# Patient Record
Sex: MALE | Race: WHITE | NOT HISPANIC OR LATINO
[De-identification: names, ages, dates, MRNs, and addresses within clinical notes are randomized per-mention and may not be internally consistent; named-entity substitution may affect disease eponyms.]

---

## 2017-01-03 ENCOUNTER — APPOINTMENT (OUTPATIENT)
Dept: ELECTROPHYSIOLOGY | Facility: CLINIC | Age: 82
End: 2017-01-03

## 2017-01-03 VITALS
DIASTOLIC BLOOD PRESSURE: 59 MMHG | OXYGEN SATURATION: 95 % | HEART RATE: 52 BPM | BODY MASS INDEX: 23.19 KG/M2 | HEIGHT: 68 IN | WEIGHT: 153 LBS | SYSTOLIC BLOOD PRESSURE: 97 MMHG

## 2017-01-12 ENCOUNTER — APPOINTMENT (OUTPATIENT)
Dept: ELECTROPHYSIOLOGY | Facility: CLINIC | Age: 82
End: 2017-01-12

## 2017-01-29 ENCOUNTER — NON-APPOINTMENT (OUTPATIENT)
Age: 82
End: 2017-01-29

## 2017-04-14 ENCOUNTER — APPOINTMENT (OUTPATIENT)
Dept: ELECTROPHYSIOLOGY | Facility: CLINIC | Age: 82
End: 2017-04-14

## 2017-04-27 ENCOUNTER — APPOINTMENT (OUTPATIENT)
Dept: ELECTROPHYSIOLOGY | Facility: CLINIC | Age: 82
End: 2017-04-27

## 2017-07-19 ENCOUNTER — NON-APPOINTMENT (OUTPATIENT)
Age: 82
End: 2017-07-19

## 2017-07-19 ENCOUNTER — APPOINTMENT (OUTPATIENT)
Dept: ELECTROPHYSIOLOGY | Facility: CLINIC | Age: 82
End: 2017-07-19

## 2017-07-19 VITALS — DIASTOLIC BLOOD PRESSURE: 68 MMHG | SYSTOLIC BLOOD PRESSURE: 97 MMHG | HEART RATE: 83 BPM | OXYGEN SATURATION: 95 %

## 2017-09-20 ENCOUNTER — TRANSCRIPTION ENCOUNTER (OUTPATIENT)
Age: 82
End: 2017-09-20

## 2017-10-12 ENCOUNTER — APPOINTMENT (OUTPATIENT)
Dept: ELECTROPHYSIOLOGY | Facility: CLINIC | Age: 82
End: 2017-10-12
Payer: COMMERCIAL

## 2017-10-12 PROCEDURE — 93295 DEV INTERROG REMOTE 1/2/MLT: CPT

## 2017-12-27 ENCOUNTER — NON-APPOINTMENT (OUTPATIENT)
Age: 82
End: 2017-12-27

## 2017-12-27 ENCOUNTER — APPOINTMENT (OUTPATIENT)
Dept: ELECTROPHYSIOLOGY | Facility: CLINIC | Age: 82
End: 2017-12-27
Payer: COMMERCIAL

## 2017-12-27 VITALS — HEART RATE: 66 BPM | DIASTOLIC BLOOD PRESSURE: 72 MMHG | SYSTOLIC BLOOD PRESSURE: 118 MMHG | OXYGEN SATURATION: 97 %

## 2017-12-27 DIAGNOSIS — Z79.899 OTHER LONG TERM (CURRENT) DRUG THERAPY: ICD-10-CM

## 2017-12-27 PROCEDURE — 93000 ELECTROCARDIOGRAM COMPLETE: CPT

## 2017-12-27 PROCEDURE — 99215 OFFICE O/P EST HI 40 MIN: CPT

## 2017-12-27 RX ORDER — APIXABAN 2.5 MG/1
2.5 TABLET, FILM COATED ORAL
Qty: 180 | Refills: 3 | Status: ACTIVE | COMMUNITY

## 2018-01-01 PROBLEM — Z79.899 ON AMIODARONE THERAPY: Status: ACTIVE | Noted: 2018-01-01

## 2018-02-09 ENCOUNTER — INPATIENT (INPATIENT)
Facility: HOSPITAL | Age: 83
LOS: 5 days | Discharge: HOME | DRG: 643 | End: 2018-02-15
Attending: INTERNAL MEDICINE | Admitting: INTERNAL MEDICINE
Payer: COMMERCIAL

## 2018-02-09 VITALS
RESPIRATION RATE: 16 BRPM | TEMPERATURE: 98 F | HEART RATE: 65 BPM | DIASTOLIC BLOOD PRESSURE: 50 MMHG | OXYGEN SATURATION: 100 % | SYSTOLIC BLOOD PRESSURE: 83 MMHG | WEIGHT: 156.97 LBS

## 2018-02-09 DIAGNOSIS — D64.9 ANEMIA, UNSPECIFIED: ICD-10-CM

## 2018-02-09 DIAGNOSIS — Z95.0 PRESENCE OF CARDIAC PACEMAKER: Chronic | ICD-10-CM

## 2018-02-09 DIAGNOSIS — Z95.810 PRESENCE OF AUTOMATIC (IMPLANTABLE) CARDIAC DEFIBRILLATOR: Chronic | ICD-10-CM

## 2018-02-09 DIAGNOSIS — I25.810 ATHEROSCLEROSIS OF CORONARY ARTERY BYPASS GRAFT(S) WITHOUT ANGINA PECTORIS: ICD-10-CM

## 2018-02-09 DIAGNOSIS — I48.2 CHRONIC ATRIAL FIBRILLATION: ICD-10-CM

## 2018-02-09 DIAGNOSIS — I10 ESSENTIAL (PRIMARY) HYPERTENSION: ICD-10-CM

## 2018-02-09 DIAGNOSIS — D50.0 IRON DEFICIENCY ANEMIA SECONDARY TO BLOOD LOSS (CHRONIC): ICD-10-CM

## 2018-02-09 LAB
ALBUMIN SERPL ELPH-MCNC: 3.1 G/DL — LOW (ref 3.3–5)
ALP SERPL-CCNC: 58 U/L — SIGNIFICANT CHANGE UP (ref 40–120)
ALT FLD-CCNC: 18 U/L RC — SIGNIFICANT CHANGE UP (ref 10–45)
ANION GAP SERPL CALC-SCNC: 15 MMOL/L — SIGNIFICANT CHANGE UP (ref 5–17)
APPEARANCE UR: CLEAR — SIGNIFICANT CHANGE UP
AST SERPL-CCNC: 22 U/L — SIGNIFICANT CHANGE UP (ref 10–40)
BASOPHILS # BLD AUTO: 0.1 K/UL — SIGNIFICANT CHANGE UP (ref 0–0.2)
BASOPHILS NFR BLD AUTO: 1.5 % — SIGNIFICANT CHANGE UP (ref 0–2)
BILIRUB SERPL-MCNC: 0.6 MG/DL — SIGNIFICANT CHANGE UP (ref 0.2–1.2)
BILIRUB UR-MCNC: NEGATIVE — SIGNIFICANT CHANGE UP
BLD GP AB SCN SERPL QL: NEGATIVE — SIGNIFICANT CHANGE UP
BUN SERPL-MCNC: 47 MG/DL — HIGH (ref 7–23)
CALCIUM SERPL-MCNC: 9.1 MG/DL — SIGNIFICANT CHANGE UP (ref 8.4–10.5)
CHLORIDE SERPL-SCNC: 103 MMOL/L — SIGNIFICANT CHANGE UP (ref 96–108)
CO2 SERPL-SCNC: 25 MMOL/L — SIGNIFICANT CHANGE UP (ref 22–31)
COLOR SPEC: YELLOW — SIGNIFICANT CHANGE UP
CORTIS SP2 SERPL-MCNC: 12.5 UG/DL — SIGNIFICANT CHANGE UP
CREAT SERPL-MCNC: 2.42 MG/DL — HIGH (ref 0.5–1.3)
DIFF PNL FLD: NEGATIVE — SIGNIFICANT CHANGE UP
DIGOXIN SERPL-MCNC: 1 NG/ML — SIGNIFICANT CHANGE UP (ref 0.8–2)
EOSINOPHIL # BLD AUTO: 0.2 K/UL — SIGNIFICANT CHANGE UP (ref 0–0.5)
EOSINOPHIL NFR BLD AUTO: 2.8 % — SIGNIFICANT CHANGE UP (ref 0–6)
GAS PNL BLDV: SIGNIFICANT CHANGE UP
GLUCOSE SERPL-MCNC: 88 MG/DL — SIGNIFICANT CHANGE UP (ref 70–99)
GLUCOSE UR QL: NEGATIVE — SIGNIFICANT CHANGE UP
HCT VFR BLD CALC: 23.8 % — LOW (ref 39–50)
HGB BLD-MCNC: 8.1 G/DL — LOW (ref 13–17)
HYALINE CASTS # UR AUTO: ABNORMAL
KETONES UR-MCNC: NEGATIVE — SIGNIFICANT CHANGE UP
LEUKOCYTE ESTERASE UR-ACNC: NEGATIVE — SIGNIFICANT CHANGE UP
LYMPHOCYTES # BLD AUTO: 2.3 K/UL — SIGNIFICANT CHANGE UP (ref 1–3.3)
LYMPHOCYTES # BLD AUTO: 31 % — SIGNIFICANT CHANGE UP (ref 13–44)
MCHC RBC-ENTMCNC: 32.6 PG — SIGNIFICANT CHANGE UP (ref 27–34)
MCHC RBC-ENTMCNC: 34.1 GM/DL — SIGNIFICANT CHANGE UP (ref 32–36)
MCV RBC AUTO: 95.5 FL — SIGNIFICANT CHANGE UP (ref 80–100)
MONOCYTES # BLD AUTO: 0.4 K/UL — SIGNIFICANT CHANGE UP (ref 0–0.9)
MONOCYTES NFR BLD AUTO: 5.3 % — SIGNIFICANT CHANGE UP (ref 2–14)
NEUTROPHILS # BLD AUTO: 4.4 K/UL — SIGNIFICANT CHANGE UP (ref 1.8–7.4)
NEUTROPHILS NFR BLD AUTO: 59.4 % — SIGNIFICANT CHANGE UP (ref 43–77)
NITRITE UR-MCNC: NEGATIVE — SIGNIFICANT CHANGE UP
PH UR: 6.5 — SIGNIFICANT CHANGE UP (ref 5–8)
PLATELET # BLD AUTO: 149 K/UL — LOW (ref 150–400)
POTASSIUM SERPL-MCNC: 4.2 MMOL/L — SIGNIFICANT CHANGE UP (ref 3.5–5.3)
POTASSIUM SERPL-SCNC: 4.2 MMOL/L — SIGNIFICANT CHANGE UP (ref 3.5–5.3)
PROT SERPL-MCNC: 5.6 G/DL — LOW (ref 6–8.3)
PROT UR-MCNC: NEGATIVE — SIGNIFICANT CHANGE UP
RBC # BLD: 2.49 M/UL — LOW (ref 4.2–5.8)
RBC # FLD: 15.2 % — HIGH (ref 10.3–14.5)
RH IG SCN BLD-IMP: POSITIVE — SIGNIFICANT CHANGE UP
SODIUM SERPL-SCNC: 143 MMOL/L — SIGNIFICANT CHANGE UP (ref 135–145)
SP GR SPEC: 1.01 — SIGNIFICANT CHANGE UP (ref 1.01–1.02)
T4 AB SER-ACNC: 1.6 UG/DL — LOW (ref 4.6–12)
TROPONIN T SERPL-MCNC: 0.14 NG/ML — HIGH (ref 0–0.06)
TSH SERPL-MCNC: 96.17 UIU/ML — HIGH (ref 0.27–4.2)
UROBILINOGEN FLD QL: NEGATIVE — SIGNIFICANT CHANGE UP
WBC # BLD: 7.5 K/UL — SIGNIFICANT CHANGE UP (ref 3.8–10.5)
WBC # FLD AUTO: 7.5 K/UL — SIGNIFICANT CHANGE UP (ref 3.8–10.5)
WBC UR QL: SIGNIFICANT CHANGE UP /HPF (ref 0–5)

## 2018-02-09 PROCEDURE — 71045 X-RAY EXAM CHEST 1 VIEW: CPT | Mod: 26

## 2018-02-09 PROCEDURE — 99285 EMERGENCY DEPT VISIT HI MDM: CPT | Mod: 25

## 2018-02-09 PROCEDURE — 93010 ELECTROCARDIOGRAM REPORT: CPT

## 2018-02-09 RX ORDER — SODIUM CHLORIDE 9 MG/ML
1000 INJECTION INTRAMUSCULAR; INTRAVENOUS; SUBCUTANEOUS ONCE
Qty: 0 | Refills: 0 | Status: COMPLETED | OUTPATIENT
Start: 2018-02-09 | End: 2018-02-09

## 2018-02-09 RX ORDER — FUROSEMIDE 40 MG
0 TABLET ORAL
Qty: 0 | Refills: 0 | COMMUNITY

## 2018-02-09 RX ORDER — PANTOPRAZOLE SODIUM 20 MG/1
40 TABLET, DELAYED RELEASE ORAL DAILY
Qty: 0 | Refills: 0 | Status: DISCONTINUED | OUTPATIENT
Start: 2018-02-09 | End: 2018-02-15

## 2018-02-09 RX ORDER — DIGOXIN 250 MCG
1 TABLET ORAL
Qty: 0 | Refills: 0 | COMMUNITY

## 2018-02-09 RX ORDER — TAMSULOSIN HYDROCHLORIDE 0.4 MG/1
0 CAPSULE ORAL
Qty: 0 | Refills: 0 | COMMUNITY

## 2018-02-09 RX ORDER — LORATADINE 10 MG/1
0 TABLET ORAL
Qty: 0 | Refills: 0 | COMMUNITY

## 2018-02-09 RX ORDER — ASPIRIN/CALCIUM CARB/MAGNESIUM 324 MG
1 TABLET ORAL
Qty: 0 | Refills: 0 | COMMUNITY

## 2018-02-09 RX ORDER — PANTOPRAZOLE SODIUM 20 MG/1
0 TABLET, DELAYED RELEASE ORAL
Qty: 0 | Refills: 0 | COMMUNITY

## 2018-02-09 RX ORDER — FUROSEMIDE 40 MG
20 TABLET ORAL DAILY
Qty: 0 | Refills: 0 | Status: DISCONTINUED | OUTPATIENT
Start: 2018-02-09 | End: 2018-02-15

## 2018-02-09 RX ORDER — AMIODARONE HYDROCHLORIDE 400 MG/1
200 TABLET ORAL
Qty: 0 | Refills: 0 | Status: DISCONTINUED | OUTPATIENT
Start: 2018-02-09 | End: 2018-02-10

## 2018-02-09 RX ORDER — LORATADINE 10 MG/1
1 TABLET ORAL
Qty: 0 | Refills: 0 | COMMUNITY

## 2018-02-09 RX ORDER — ASPIRIN/CALCIUM CARB/MAGNESIUM 324 MG
324 TABLET ORAL ONCE
Qty: 0 | Refills: 0 | Status: DISCONTINUED | OUTPATIENT
Start: 2018-02-09 | End: 2018-02-09

## 2018-02-09 RX ORDER — SODIUM CHLORIDE 9 MG/ML
3 INJECTION INTRAMUSCULAR; INTRAVENOUS; SUBCUTANEOUS ONCE
Qty: 0 | Refills: 0 | Status: COMPLETED | OUTPATIENT
Start: 2018-02-09 | End: 2018-02-09

## 2018-02-09 RX ORDER — TAMSULOSIN HYDROCHLORIDE 0.4 MG/1
0.4 CAPSULE ORAL AT BEDTIME
Qty: 0 | Refills: 0 | Status: DISCONTINUED | OUTPATIENT
Start: 2018-02-09 | End: 2018-02-15

## 2018-02-09 RX ORDER — CARVEDILOL PHOSPHATE 80 MG/1
25 CAPSULE, EXTENDED RELEASE ORAL EVERY 12 HOURS
Qty: 0 | Refills: 0 | Status: DISCONTINUED | OUTPATIENT
Start: 2018-02-09 | End: 2018-02-10

## 2018-02-09 RX ORDER — ALLOPURINOL 300 MG
0 TABLET ORAL
Qty: 0 | Refills: 0 | COMMUNITY

## 2018-02-09 RX ORDER — PANTOPRAZOLE SODIUM 20 MG/1
1 TABLET, DELAYED RELEASE ORAL
Qty: 0 | Refills: 0 | COMMUNITY

## 2018-02-09 RX ORDER — AMIODARONE HYDROCHLORIDE 400 MG/1
0 TABLET ORAL
Qty: 0 | Refills: 0 | COMMUNITY

## 2018-02-09 RX ORDER — SODIUM CHLORIDE 9 MG/ML
500 INJECTION INTRAMUSCULAR; INTRAVENOUS; SUBCUTANEOUS ONCE
Qty: 0 | Refills: 0 | Status: COMPLETED | OUTPATIENT
Start: 2018-02-09 | End: 2018-02-09

## 2018-02-09 RX ORDER — APIXABAN 2.5 MG/1
1 TABLET, FILM COATED ORAL
Qty: 0 | Refills: 0 | COMMUNITY

## 2018-02-09 RX ORDER — CARVEDILOL PHOSPHATE 80 MG/1
0 CAPSULE, EXTENDED RELEASE ORAL
Qty: 0 | Refills: 0 | COMMUNITY

## 2018-02-09 RX ORDER — APIXABAN 2.5 MG/1
0 TABLET, FILM COATED ORAL
Qty: 0 | Refills: 0 | COMMUNITY

## 2018-02-09 RX ORDER — ASPIRIN/CALCIUM CARB/MAGNESIUM 324 MG
324 TABLET ORAL ONCE
Qty: 0 | Refills: 0 | Status: COMPLETED | OUTPATIENT
Start: 2018-02-09 | End: 2018-02-09

## 2018-02-09 RX ORDER — ALLOPURINOL 300 MG
100 TABLET ORAL DAILY
Qty: 0 | Refills: 0 | Status: DISCONTINUED | OUTPATIENT
Start: 2018-02-09 | End: 2018-02-15

## 2018-02-09 RX ORDER — FLUTICASONE PROPIONATE 50 MCG
0 SPRAY, SUSPENSION NASAL
Qty: 0 | Refills: 0 | COMMUNITY

## 2018-02-09 RX ADMIN — TAMSULOSIN HYDROCHLORIDE 0.4 MILLIGRAM(S): 0.4 CAPSULE ORAL at 21:25

## 2018-02-09 RX ADMIN — SODIUM CHLORIDE 3 MILLILITER(S): 9 INJECTION INTRAMUSCULAR; INTRAVENOUS; SUBCUTANEOUS at 16:25

## 2018-02-09 RX ADMIN — SODIUM CHLORIDE 2000 MILLILITER(S): 9 INJECTION INTRAMUSCULAR; INTRAVENOUS; SUBCUTANEOUS at 16:26

## 2018-02-09 RX ADMIN — CARVEDILOL PHOSPHATE 25 MILLIGRAM(S): 80 CAPSULE, EXTENDED RELEASE ORAL at 21:25

## 2018-02-09 RX ADMIN — SODIUM CHLORIDE 4000 MILLILITER(S): 9 INJECTION INTRAMUSCULAR; INTRAVENOUS; SUBCUTANEOUS at 16:26

## 2018-02-09 RX ADMIN — Medication 324 MILLIGRAM(S): at 16:50

## 2018-02-09 RX ADMIN — AMIODARONE HYDROCHLORIDE 200 MILLIGRAM(S): 400 TABLET ORAL at 21:25

## 2018-02-09 RX ADMIN — SODIUM CHLORIDE 4000 MILLILITER(S): 9 INJECTION INTRAMUSCULAR; INTRAVENOUS; SUBCUTANEOUS at 15:25

## 2018-02-09 NOTE — ED PROVIDER NOTE - MEDICAL DECISION MAKING DETAILS
MDM: 82yo M with AICD, cardiomyopathy - Hgb low, likely due to GIB. will CBC, CMP, T&S, likely transfuse given symptoms.

## 2018-02-09 NOTE — ED PROVIDER NOTE - PHYSICAL EXAMINATION
Gen: Pale elderly male, NAD  Head: NCAT  HEENT: PERRL, MMM, normal conjunctiva, anicteric, neck supple  Lung: CTAB, no adventitious sounds  CV: RRR, no murmurs, rubs or gallops; AICD in place  Abd: soft, NTND, no rebound or guarding, no CVAT  MSK: No edema, no visible deformities  Neuro: No focal neurologic deficits. CN II-XII grossly intact. 5/5 strength and normal sensation in all extremities.  Skin: Warm and dry, no evidence of rash  Psych: normal mood and affect  HOBT neg. no bleeding on exam. Gen: Pale elderly male, NAD  Head: NCAT  HEENT: PERRL, MMM, pale conjunctiva, anicteric, neck supple  Lung: CTAB, no adventitious sounds  CV: RRR, no murmurs, rubs or gallops; AICD in place  Abd: soft, NTND, no rebound or guarding, no CVAT  MSK: No edema, no visible deformities  Neuro: No focal neurologic deficits. CN II-XII grossly intact. 5/5 strength and normal sensation in all extremities.  Skin: Warm and dry, no evidence of rash  Psych: normal mood and affect  HOBT neg. no bleeding on exam.

## 2018-02-09 NOTE — ED PROVIDER NOTE - ATTENDING CONTRIBUTION TO CARE
stent in w low h/h and elevated dig. d/w pt daughter an np in this hospital - pt has been feeling general malaise and fatigue w no specific focus of any infection. no uri symptoms. on exam pt is chronically ill, does not look in significant distress but does appear unwell. clear lungs, non-tender abdominal. will check ekg. xr chest. h/h. trop for his fatigue. suspect that there may be a component of cardiogenic shock however he is not tachycardic. trop leak prob type 2 mi / symptomatic anemia. asa but no ac for now.

## 2018-02-09 NOTE — ED ADULT NURSE NOTE - PMH
Atrial fibrillation    BPH (benign prostatic hyperplasia)    CAD (coronary artery disease)    Hypertension

## 2018-02-09 NOTE — ED PROVIDER NOTE - OBJECTIVE STATEMENT
82yo M with hx of Afib on AC, cardiomyopathy (normal BP 80s/50s) with AICD presents with low Hgb, elevated dig levels from PMD. Went to PMD, had levels checked, told to come to the ER. pt complaining of some bleeding from rectum intermittently over the last month. endorsing sob and dizziness with ambulation and also when he is getting up from lying down to standing. no fevers, chills, recent illnesses. lives at home with wife, son. previously able to ambulate with cane, but since 3 weeks ago, can barely walk with walker.

## 2018-02-09 NOTE — ED PROVIDER NOTE - NS ED ROS FT
ROS: denies HA, fevers/chills, nausea/vomiting, chest pain, diaphoresis, abdominal pain, diarrhea, joint pain, neuro deficits, dysuria/hematuria, rash    +weakness, dizziness, exertional sob, rectal bleeding

## 2018-02-09 NOTE — H&P ADULT - REASON FOR ADMISSION
84yo M with hx of Afib on AC, cardiomyopathy (normal BP 80s/50s) with AICD presents with low Hgb, elevated dig levels from PMD. Went to PMD, had levels checked, told to come to the ER. pt complaining of some bleeding from rectum intermittently over the last month. endorsing sob and dizziness with ambulation and also when he is getting up from lying down to standing. no fevers, chills, recent illnesses. lives at home with wife, son. previously able to ambulate with cane, but since 3 weeks ago, can barely walk with walker.

## 2018-02-10 DIAGNOSIS — E03.9 HYPOTHYROIDISM, UNSPECIFIED: ICD-10-CM

## 2018-02-10 DIAGNOSIS — K62.5 HEMORRHAGE OF ANUS AND RECTUM: ICD-10-CM

## 2018-02-10 LAB
CORTIS PRE/P CHAL SERPL-SCNC: SIGNIFICANT CHANGE UP
DIGOXIN SERPL-MCNC: 1.2 NG/ML — SIGNIFICANT CHANGE UP (ref 0.8–2)
GLUCOSE BLDC GLUCOMTR-MCNC: 126 MG/DL — HIGH (ref 70–99)
GLUCOSE BLDC GLUCOMTR-MCNC: 88 MG/DL — SIGNIFICANT CHANGE UP (ref 70–99)
HCT VFR BLD CALC: 20.3 % — CRITICAL LOW (ref 39–50)
HCT VFR BLD CALC: 21.4 % — LOW (ref 39–50)
HGB BLD-MCNC: 6.7 G/DL — CRITICAL LOW (ref 13–17)
HGB BLD-MCNC: 7.3 G/DL — LOW (ref 13–17)
MCHC RBC-ENTMCNC: 30.5 PG — SIGNIFICANT CHANGE UP (ref 27–34)
MCHC RBC-ENTMCNC: 32.7 PG — SIGNIFICANT CHANGE UP (ref 27–34)
MCHC RBC-ENTMCNC: 33 GM/DL — SIGNIFICANT CHANGE UP (ref 32–36)
MCHC RBC-ENTMCNC: 34.3 GM/DL — SIGNIFICANT CHANGE UP (ref 32–36)
MCV RBC AUTO: 92.3 FL — SIGNIFICANT CHANGE UP (ref 80–100)
MCV RBC AUTO: 95.3 FL — SIGNIFICANT CHANGE UP (ref 80–100)
PLATELET # BLD AUTO: 131 K/UL — LOW (ref 150–400)
PLATELET # BLD AUTO: 136 K/UL — LOW (ref 150–400)
RBC # BLD: 2.2 M/UL — LOW (ref 4.2–5.8)
RBC # BLD: 2.24 M/UL — LOW (ref 4.2–5.8)
RBC # FLD: 15.8 % — HIGH (ref 10.3–14.5)
RBC # FLD: 17.7 % — HIGH (ref 10.3–14.5)
WBC # BLD: 6.78 K/UL — SIGNIFICANT CHANGE UP (ref 3.8–10.5)
WBC # BLD: 8.2 K/UL — SIGNIFICANT CHANGE UP (ref 3.8–10.5)
WBC # FLD AUTO: 6.78 K/UL — SIGNIFICANT CHANGE UP (ref 3.8–10.5)
WBC # FLD AUTO: 8.2 K/UL — SIGNIFICANT CHANGE UP (ref 3.8–10.5)

## 2018-02-10 RX ORDER — CARVEDILOL PHOSPHATE 80 MG/1
25 CAPSULE, EXTENDED RELEASE ORAL EVERY 12 HOURS
Qty: 0 | Refills: 0 | Status: DISCONTINUED | OUTPATIENT
Start: 2018-02-10 | End: 2018-02-15

## 2018-02-10 RX ORDER — INSULIN LISPRO 100/ML
VIAL (ML) SUBCUTANEOUS
Qty: 0 | Refills: 0 | Status: DISCONTINUED | OUTPATIENT
Start: 2018-02-10 | End: 2018-02-15

## 2018-02-10 RX ORDER — LEVOTHYROXINE SODIUM 125 MCG
50 TABLET ORAL DAILY
Qty: 0 | Refills: 0 | Status: DISCONTINUED | OUTPATIENT
Start: 2018-02-10 | End: 2018-02-15

## 2018-02-10 RX ORDER — LIOTHYRONINE SODIUM 25 UG/1
5 TABLET ORAL DAILY
Qty: 0 | Refills: 0 | Status: DISCONTINUED | OUTPATIENT
Start: 2018-02-10 | End: 2018-02-12

## 2018-02-10 RX ORDER — INSULIN LISPRO 100/ML
VIAL (ML) SUBCUTANEOUS AT BEDTIME
Qty: 0 | Refills: 0 | Status: DISCONTINUED | OUTPATIENT
Start: 2018-02-10 | End: 2018-02-15

## 2018-02-10 RX ADMIN — Medication 50 MICROGRAM(S): at 22:31

## 2018-02-10 RX ADMIN — PANTOPRAZOLE SODIUM 40 MILLIGRAM(S): 20 TABLET, DELAYED RELEASE ORAL at 11:13

## 2018-02-10 RX ADMIN — Medication 20 MILLIGRAM(S): at 05:20

## 2018-02-10 RX ADMIN — AMIODARONE HYDROCHLORIDE 200 MILLIGRAM(S): 400 TABLET ORAL at 05:20

## 2018-02-10 RX ADMIN — Medication 100 MILLIGRAM(S): at 11:13

## 2018-02-10 RX ADMIN — LIOTHYRONINE SODIUM 5 MICROGRAM(S): 25 TABLET ORAL at 22:31

## 2018-02-10 RX ADMIN — TAMSULOSIN HYDROCHLORIDE 0.4 MILLIGRAM(S): 0.4 CAPSULE ORAL at 22:31

## 2018-02-10 NOTE — CONSULT NOTE ADULT - SUBJECTIVE AND OBJECTIVE BOX
Chief Complaint:  Patient is a 83y old  Male who presents with a chief complaint of 82yo M with hx of Afib on AC, cardiomyopathy (normal BP 80s/50s) with AICD presents with low Hgb, elevated dig levels from PMD. Went to PMD, had levels checked, told to come to the ER. pt complaining of some bleeding from rectum intermittently over the last month. endorsing sob and dizziness with ambulation and also when he is getting up from lying down to standing. no fevers, chills, recent illnesses. lives at home with wife, son. previously able to ambulate with cane, but since 3 weeks ago, can barely walk with walker. (2018 19:58)    Atrial fibrillation  Hypertension  BPH (benign prostatic hyperplasia)  CAD (coronary artery disease)  AICD (automatic cardioverter/defibrillator) present  Cardiac pacemaker     HPI:      Bactrim (Rash)  cefadroxil (Hives)  Levaquin (Rash)  Lipitor (Rash)      allopurinol 100 milliGRAM(s) Oral daily  amiodarone    Tablet 200 milliGRAM(s) Oral two times a day  carvedilol 25 milliGRAM(s) Oral every 12 hours  furosemide    Tablet 20 milliGRAM(s) Oral daily  insulin lispro (HumaLOG) corrective regimen sliding scale   SubCutaneous three times a day before meals  insulin lispro (HumaLOG) corrective regimen sliding scale   SubCutaneous at bedtime  pantoprazole  Injectable 40 milliGRAM(s) IV Push daily  tamsulosin 0.4 milliGRAM(s) Oral at bedtime        FAMILY HISTORY:  No pertinent family history in first degree relatives        Review of Systems:    General:  No wt loss, fevers, chills, night sweats,fatigue,   Eyes:  Good vision, no reported pain  ENT:  No sore throat, pain, runny nose, dysphagia  CV:  No pain, palpitatioins, hypo/hypertension  Resp:  No dyspnea, cough, tachypnea, wheezing  :  No pain, bleeding, incontinence, nocturia  Muscle:  No pain, weakness  Neuro:  No weakness, tingling, memory problems  Psych:  No fatigue, insomnia, mood problems, depression  Endocrine:  No polyuria, polydypsia, cold/heat intolerance  Heme:  No petechiae, ecchymosis, easy bruisability  Skin:  No rash, tattoos, scars, edema    Relevant Family History:       Relevant Social History:       Physical Exam:    Vital Signs:  Vital Signs Last 24 Hrs  T(C): 36.4 (10 Feb 2018 11:17), Max: 36.5 (10 Feb 2018 05:14)  T(F): 97.6 (10 Feb 2018 11:17), Max: 97.7 (10 Feb 2018 05:14)  HR: 66 (10 Feb 2018 11:) (60 - 83)  BP: 121/67 (10 Feb 2018 11:) (83/50 - 121/67)  BP(mean): --  RR: 18 (10 Feb 2018 11:) (16 - 20)  SpO2: 98% (10 Feb 2018 11:) (96% - 100%)  Daily Height in cm: 172.72 (2018 20:04)    Daily     General:  Appears stated age, well-groomed, well-nourished, no distress  HEENT:  NC/AT,  conjunctivae clear and pink, no thyromegaly, nodules, adenopathy, no JVD  Chest:  Full & symmetric excursion, no increased effort, breath sounds clear  Cardiovascular:  Regular rhythm, S1, S2, no murmur/rub/S3/S4, no abdominal bruit, no edema  Abdomen:  Soft, non-tender, non-distended, normoactive bowel sounds,  no masses ,no hepatosplenomeagaly, no signs of chronic liver disease  Extremities:  no cyanosis,clubbing or edema  Skin:  No rash/erythema/ecchymoses/petechiae/wounds/abscess/warm/dry  Neuro/Psych:  Alert, oriented, no asterixis, no tremor, no encephalopathy    Laboratory:                            7.3    8.2   )-----------( 131      ( 10 Feb 2018 12:19 )             21.4     02-09    143  |  103  |  47<H>  ----------------------------<  88  4.2   |  25  |  2.42<H>    Ca    9.1      2018 15:01    TPro  5.6<L>  /  Alb  3.1<L>  /  TBili  0.6  /  DBili  x   /  AST  22  /  ALT  18  /  AlkPhos  58  02-09    LIVER FUNCTIONS - ( 2018 15:01 )  Alb: 3.1 g/dL / Pro: 5.6 g/dL / ALK PHOS: 58 U/L / ALT: 18 U/L RC / AST: 22 U/L / GGT: x             Urinalysis Basic - ( 2018 18:58 )    Color: Yellow / Appearance: Clear / S.011 / pH: x  Gluc: x / Ketone: Negative  / Bili: Negative / Urobili: Negative   Blood: x / Protein: Negative / Nitrite: Negative   Leuk Esterase: Negative / RBC: x / WBC 0-2 /HPF   Sq Epi: x / Non Sq Epi: x / Bacteria: x        Imaging:

## 2018-02-10 NOTE — CONSULT NOTE ADULT - ASSESSMENT
Patient is an 84 y/o amiodarone treated admitted for increasing fatigue and SOB for about one month noted with severe hypothyroidism.    No family history, no recent contrast dye, no recent URI or neck pain- suspicious for amiodarone induced hypothyroidism.    Patient with cardiac history- both CHF and AF.  CRF- new? Ca. 9.1 renal noted.  Severely hypothyroid, but no myxedema.  Normal PM cortisol.    Will need to start treatment gradually, as patient alert will start PO with T4 and very low T3, due to amiodarone effect to prevent T4 to T3 conversion.

## 2018-02-10 NOTE — CONSULT NOTE ADULT - SUBJECTIVE AND OBJECTIVE BOX
HPI:   82 yo M with hx of CKD3-4 admitted for evaluation of acute anemia. Relates intermittent blood in stool for few months. Pos unsteady gait, dyspnea. He is known to have low BP at baseline due to advanced systolic CM.   Has received 1 Unit of PRBCs for Hgb of 6.7.   Presently comfortable. Denies CP, SOB.       PAST MEDICAL & SURGICAL HISTORY:  Atrial fibrillation  Hypertension  BPH (benign prostatic hyperplasia)  CAD (coronary artery disease)  AICD (automatic cardioverter/defibrillator) present  Cardiac pacemaker      FAMILY HISTORY:  No pertinent family history in first degree relatives      Allergies:  Bactrim (Rash)  cefadroxil (Hives)  Levaquin (Rash)  Lipitor (Rash)        MEDICATIONS  (STANDING):  allopurinol 100 milliGRAM(s) Oral daily  carvedilol 25 milliGRAM(s) Oral every 12 hours  furosemide    Tablet 20 milliGRAM(s) Oral daily  insulin lispro (HumaLOG) corrective regimen sliding scale   SubCutaneous three times a day before meals  insulin lispro (HumaLOG) corrective regimen sliding scale   SubCutaneous at bedtime  pantoprazole  Injectable 40 milliGRAM(s) IV Push daily  tamsulosin 0.4 milliGRAM(s) Oral at bedtime    MEDICATIONS  (PRN):      Daily Height in cm: 172.72 (2018 20:04)    Daily     Drug Dosing Weight  Height (cm): 172.72 (2018 20:04)  Weight (kg): 77 (2018 20:04)  BMI (kg/m2): 25.8 (2018 20:04)  BSA (m2): 1.91 (2018 20:04)      REVIEW OF SYSTEMS:  Per HPI      I&O's Detail    2018 07:01  -  10 Feb 2018 07:00  --------------------------------------------------------  IN:    Oral Fluid: 240 mL  Total IN: 240 mL    OUT:    Voided: 300 mL  Total OUT: 300 mL    Total NET: -60 mL      10 Feb 2018 07:01  -  10 Feb 2018 18:13  --------------------------------------------------------  IN:    Oral Fluid: 600 mL  Total IN: 600 mL    OUT:    Voided: 600 mL  Total OUT: 600 mL    Total NET: 0 mL           @ 07:01  -  02-10 @ 07:00  --------------------------------------------------------  IN: 240 mL / OUT: 300 mL / NET: -60 mL    02-10 @ 07:01  -  02-10 @ 18:13  --------------------------------------------------------  IN: 600 mL / OUT: 600 mL / NET: 0 mL        PHYSICAL EXAM:    GENERAL: NAD  HEAD:  Atraumatic, Normocephalic  EYES: EOMI, PERRLA, conjunctiva and sclera clear  ENMT: No tonsillar erythema, exudates, or enlargement; Moist mucous membranes, Good dentition, No lesions  NECK: Supple, No JVD, Normal thyroid  NERVOUS SYSTEM:  Alert & Oriented X3, Good concentration; Motor Strength 5/5 B/L upper and lower extremities; DTRs 2+ intact and symmetric  CHEST/LUNG: Clear to percussion bilaterally; No rales, rhonchi, wheezing, or rubs  HEART: Regular rate and rhythm; No murmurs, rubs, or gallops  ABDOMEN: Soft, Nontender, Nondistended; Bowel sounds present  EXTREMITIES:  trace edema. Stasis dermatosis  LYMPH: No lymphadenopathy noted  SKIN: No rashes or lesions    LABS:  CBC Full  -  ( 10 Feb 2018 12:19 )  WBC Count : 8.2 K/uL  Hemoglobin : 7.3 g/dL  Hematocrit : 21.4 %  Platelet Count - Automated : 131 K/uL  Mean Cell Volume : 95.3 fl  Mean Cell Hemoglobin : 32.7 pg  Mean Cell Hemoglobin Concentration : 34.3 gm/dL  Auto Neutrophil # : x  Auto Lymphocyte # : x  Auto Monocyte # : x  Auto Eosinophil # : x  Auto Basophil # : x  Auto Neutrophil % : x  Auto Lymphocyte % : x  Auto Monocyte % : x  Auto Eosinophil % : x  Auto Basophil % : x        143  |  103  |  47<H>  ----------------------------<  88  4.2   |  25  |  2.42<H>    Ca    9.1      2018 15:01    TPro  5.6<L>  /  Alb  3.1<L>  /  TBili  0.6  /  DBili  x   /  AST  22  /  ALT  18  /  AlkPhos  58      CAPILLARY BLOOD GLUCOSE      POCT Blood Glucose.: 126 mg/dL (10 Feb 2018 17:43)      Urinalysis Basic - ( 2018 18:58 )    Color: Yellow / Appearance: Clear / S.011 / pH: x  Gluc: x / Ketone: Negative  / Bili: Negative / Urobili: Negative   Blood: x / Protein: Negative / Nitrite: Negative   Leuk Esterase: Negative / RBC: x / WBC 0-2 /HPF   Sq Epi: x / Non Sq Epi: x / Bacteria: x      CARDIAC MARKERS ( 2018 15:01 )  x     / 0.14 ng/mL / x     / x     / x                Impression:  * CKD3-4. [place] urine. Cr near baseline  * Acute on ?chronic anemia of GI blood loss  * Chronic hypotension  * Systolic CM  Recommendations:  * Cont to transfuse for Hgb <8  * GI eval of source of bleed  * No need for extensive inpt renal investigation  * Monitor on maint PO diuretic

## 2018-02-10 NOTE — CONSULT NOTE ADULT - SUBJECTIVE AND OBJECTIVE BOX
Admit Diagnosis  Anemia      ENDOCRINE HPI: Patient is an 84 y/o amiodarone treated admitted for increasing fatigue and SOB for about one month noted with severe hypothyroidism    No h/o hypothyroidism, no F/H. On a recent lab work a month ago was noted with minimal TSH elevation. He denies recent URI or contrast dye. No neck pain. He noted increasing fatigue, more brushing and muscle weakness. On routine lab TSH 96.7, T4-1.6.  Patient also noted with severe anemia, normal MCV. Some red blood drops with stool.   Patient has h/o CAD, CHF, AF AICD placement and renal failure.    PAST MEDICAL & SURGICAL HISTORY:  Atrial fibrillation  Hypertension  BPH (benign prostatic hyperplasia)  CAD (coronary artery disease)  AICD (automatic cardioverter/defibrillator) present  Cardiac pacemaker      FAMILY HISTORY:  No family history of thyroid disease.      Social History: Lives home, walks with walker, [id number].    Outpatient Medications: Amiodarone 200 mg for 3-4 months now. was on it in the past for a year till 9 months ago.    MEDICATIONS  (STANDING):  allopurinol 100 milliGRAM(s) Oral daily  carvedilol 25 milliGRAM(s) Oral every 12 hours  furosemide    Tablet 20 milliGRAM(s) Oral daily  insulin lispro (HumaLOG) corrective regimen sliding scale   SubCutaneous three times a day before meals  insulin lispro (HumaLOG) corrective regimen sliding scale   SubCutaneous at bedtime  pantoprazole  Injectable 40 milliGRAM(s) IV Push daily  tamsulosin 0.4 milliGRAM(s) Oral at bedtime    MEDICATIONS  (PRN):      Allergies    Bactrim (Rash)  cefadroxil (Hives)  Levaquin (Rash)  Lipitor (Rash)    Intolerances        Review of Systems:  Constitutional: No fever, no chills, extreme fatigue muscle weakness.  Eyes: No blurry vision  Neuro: No tremors  HEENT: No pain, runny nose  Cardiovascular: No chest pain, palpitations  Respiratory: SOB, no cough  GI: No nausea, vomiting, abdominal pain  : No dysuria  Skin: no rash, multiple ecchymosis  Psych: no depression  Endocrine: no polyuria, polydipsia  Hem/lymph: no swelling  Osteoporosis: no fractures    ALL OTHER SYSTEMS REVIEWED AND NEGATIVE    UNABLE TO OBTAIN    PHYSICAL EXAM:  VITALS: T(C): 36.7 (02-10-18 @ 18:46)  T(F): 98.1 (02-10-18 @ 18:46), Max: 98.1 (02-10-18 @ 18:46)  HR: 66 (02-10-18 @ 18:46) (60 - 71)  BP: 92/49 (02-10-18 @ 18:46) (90/48 - 121/67)  RR:  (18 - 18)  SpO2:  (95% - 98%)  Wt(lbs): --169  GENERAL: NAD, well-groomed, well-developed, skin ecchymosis arms and legs.  EYES: No proptosis, no lid lag, anicteric  HEENT:  Atraumatic, Normocephalic, moist mucous membranes  THYROID: Normal size, no palpable nodules, no goiter, no tenderness.  RESPIRATORY: Clear to auscultation bilaterally; No rales, rhonchi, wheezing  CARDIOVASCULAR: Regular rate and rhythm; No murmurs; no peripheral edema  GI: Soft, nontender, non distended, normal bowel sounds  SKIN: Dry, intact, No rashes, diffuse ecchymosis  MUSCULOSKELETAL: Full range of motion, reduced strength  NEURO: sensation intact, extraocular movements intact, no tremor, slow DTR's  PSYCH: Alert and oriented x 3, normal affect, normal mood    POCT Blood Glucose.: 126 mg/dL (02-10-18 @ 17:43)                            7.3    8.2   )-----------( 131      ( 10 Feb 2018 12:19 )             21.4       02-09    143  |  103  |  47<H>  ----------------------------<  88  4.2   |  25  |  2.42<H>    Ca    9.1      09 Feb 2018 15:01    TPro  5.6<L>  /  Alb  3.1<L>  /  TBili  0.6  /  DBili  x   /  AST  22  /  ALT  18  /  AlkPhos  58  02-09      Thyroid Function Tests:  02-09 @ 18:36 TSH 96.17 FreeT4 -- T3 -- Anti TPO -- Anti Thyroglobulin Ab -- TSI --              Radiology:

## 2018-02-10 NOTE — CONSULT NOTE ADULT - PROBLEM SELECTOR RECOMMENDATION 9
monitor cbc  transfuse prn  pt will need cardiac optimization prior to GI intervention  AICD interrogation  advance diet  plan of egd and colon early next week
Synthroid 50 mcg daily.  Cytomel 5 mcg daily.  First dose tonight. repeat TFT's Monday.

## 2018-02-11 LAB
ANION GAP SERPL CALC-SCNC: 14 MMOL/L — SIGNIFICANT CHANGE UP (ref 5–17)
BUN SERPL-MCNC: 36 MG/DL — HIGH (ref 7–23)
CALCIUM SERPL-MCNC: 8.7 MG/DL — SIGNIFICANT CHANGE UP (ref 8.4–10.5)
CHLORIDE SERPL-SCNC: 105 MMOL/L — SIGNIFICANT CHANGE UP (ref 96–108)
CK MB CFR SERPL CALC: 2.3 NG/ML — SIGNIFICANT CHANGE UP (ref 0–6.7)
CO2 SERPL-SCNC: 24 MMOL/L — SIGNIFICANT CHANGE UP (ref 22–31)
CREAT SERPL-MCNC: 1.87 MG/DL — HIGH (ref 0.5–1.3)
GLUCOSE BLDC GLUCOMTR-MCNC: 117 MG/DL — HIGH (ref 70–99)
GLUCOSE BLDC GLUCOMTR-MCNC: 118 MG/DL — HIGH (ref 70–99)
GLUCOSE BLDC GLUCOMTR-MCNC: 118 MG/DL — HIGH (ref 70–99)
GLUCOSE BLDC GLUCOMTR-MCNC: 132 MG/DL — HIGH (ref 70–99)
GLUCOSE SERPL-MCNC: 91 MG/DL — SIGNIFICANT CHANGE UP (ref 70–99)
HCT VFR BLD CALC: 27.8 % — LOW (ref 39–50)
HGB BLD-MCNC: 8.8 G/DL — LOW (ref 13–17)
MCHC RBC-ENTMCNC: 29.3 PG — SIGNIFICANT CHANGE UP (ref 27–34)
MCHC RBC-ENTMCNC: 31.7 GM/DL — LOW (ref 32–36)
MCV RBC AUTO: 92.7 FL — SIGNIFICANT CHANGE UP (ref 80–100)
PLATELET # BLD AUTO: 162 K/UL — SIGNIFICANT CHANGE UP (ref 150–400)
POTASSIUM SERPL-MCNC: 3.8 MMOL/L — SIGNIFICANT CHANGE UP (ref 3.5–5.3)
POTASSIUM SERPL-SCNC: 3.8 MMOL/L — SIGNIFICANT CHANGE UP (ref 3.5–5.3)
RBC # BLD: 3 M/UL — LOW (ref 4.2–5.8)
RBC # FLD: 17.2 % — HIGH (ref 10.3–14.5)
SODIUM SERPL-SCNC: 143 MMOL/L — SIGNIFICANT CHANGE UP (ref 135–145)
TROPONIN T SERPL-MCNC: 0.12 NG/ML — HIGH (ref 0–0.06)
TROPONIN T SERPL-MCNC: 0.13 NG/ML — HIGH (ref 0–0.06)
WBC # BLD: 7.1 K/UL — SIGNIFICANT CHANGE UP (ref 3.8–10.5)
WBC # FLD AUTO: 7.1 K/UL — SIGNIFICANT CHANGE UP (ref 3.8–10.5)

## 2018-02-11 PROCEDURE — 99223 1ST HOSP IP/OBS HIGH 75: CPT

## 2018-02-11 RX ADMIN — Medication 20 MILLIGRAM(S): at 06:18

## 2018-02-11 RX ADMIN — CARVEDILOL PHOSPHATE 25 MILLIGRAM(S): 80 CAPSULE, EXTENDED RELEASE ORAL at 17:30

## 2018-02-11 RX ADMIN — Medication 50 MICROGRAM(S): at 06:18

## 2018-02-11 RX ADMIN — LIOTHYRONINE SODIUM 5 MICROGRAM(S): 25 TABLET ORAL at 06:18

## 2018-02-11 RX ADMIN — PANTOPRAZOLE SODIUM 40 MILLIGRAM(S): 20 TABLET, DELAYED RELEASE ORAL at 11:33

## 2018-02-11 RX ADMIN — CARVEDILOL PHOSPHATE 25 MILLIGRAM(S): 80 CAPSULE, EXTENDED RELEASE ORAL at 06:18

## 2018-02-11 RX ADMIN — Medication 100 MILLIGRAM(S): at 11:33

## 2018-02-11 RX ADMIN — TAMSULOSIN HYDROCHLORIDE 0.4 MILLIGRAM(S): 0.4 CAPSULE ORAL at 22:20

## 2018-02-11 NOTE — PROGRESS NOTE ADULT - NSHPATTENDINGPLANDISCUSS_GEN_ALL_CORE
PAtient and his daughter, Nataliia Delgado
family at bedside
PAtient and his daughter, Nataliia Delgado

## 2018-02-11 NOTE — CONSULT NOTE ADULT - SUBJECTIVE AND OBJECTIVE BOX
Cardiology Attending Consult Note      CHIEF COMPLAINT/REASON FOR CONSULT: CHF, pre-op optimization  Date of Admission: 02-09-18    HISTORY OF PRESENT ILLNESS:    83y.o. 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 At the time of my visit he reports feeling weak overall, but denies recent chest pain. At rest he has no SOB, but does feel SOB with some exertion. He typically walks with walker. No recent dizziness/HA. No palpitations. No N/V/D/F/C. Prior to admission he report progressive weakness over the last, with a fall in his living room after his legs gave out approximately 4 weeks ago.     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     ALLERGIES:  Bactrim (Rash)  cefadroxil (Hives)  Levaquin (Rash)  Lipitor (Rash)    Home Medications:  allopurinol: 1 tab(s) orally once a day (09 Feb 2018 18:25)  amiodarone 200 mg oral tablet: 1 tab(s) orally 2 times a day (09 Feb 2018 18:25)  Coreg 25 mg oral tablet: 1 tab(s) orally 2 times a day (09 Feb 2018 18:25)  Flomax 0.4 mg oral capsule: 1 cap(s) orally once a day (09 Feb 2018 18:25)  Lasix 20 mg oral tablet: 1 tab(s) orally once a day (09 Feb 2018 18:25)      MEDICATIONS:  carvedilol 25 milliGRAM(s) Oral every 12 hours  furosemide    Tablet 20 milliGRAM(s) Oral daily  tamsulosin 0.4 milliGRAM(s) Oral at bedtime  pantoprazole  Injectable 40 milliGRAM(s) IV Push daily    allopurinol 100 milliGRAM(s) Oral daily  insulin lispro (HumaLOG) corrective regimen sliding scale   SubCutaneous three times a day before meals  insulin lispro (HumaLOG) corrective regimen sliding scale   SubCutaneous at bedtime  levothyroxine 50 MICROGram(s) Oral daily  liothyronine 5 MICROGram(s) Oral daily        PAST MEDICAL & SURGICAL HISTORY:  Atrial fibrillation  Hypertension  BPH (benign prostatic hyperplasia)  CAD (coronary artery disease)  AICD (automatic cardioverter/defibrillator) present  Cardiac pacemaker      FAMILY HISTORY:  No pertinent family history in first degree relatives      SOCIAL HISTORY:    Remote former smoker, no ETOH, no IVDU  -Lives with Son and Wife (both nurses)  -Daughter is an NP at [employer]    REVIEW OF SYSTEMS:    CONSTITUTIONAL: +weakness, fevers or chills  EYES/ENT: No visual changes;  No vertigo or throat pain   NECK: No pain or stiffness  RESPIRATORY: No cough, wheezing, hemoptysis; +shortness of breath  CARDIOVASCULAR: No chest pain or palpitations  GASTROINTESTINAL: No abdominal or epigastric pain. No nausea, vomiting, or hematemesis; No diarrhea or constipation. No melena or hematochezia.  GENITOURINARY: No dysuria, frequency or hematuria  NEUROLOGICAL: No numbness or weakness  SKIN: No itching, burning, rashes, or lesions   All other review of systems is negative unless indicated above.    PHYSICAL EXAM:  T(C): 37 (02-10-18 @ 22:00), Max: 37 (02-10-18 @ 22:00)  HR: 68 (02-10-18 @ 22:00) (60 - 68)  BP: 121/70 (02-10-18 @ 22:00) (90/48 - 121/70)  RR: 18 (02-10-18 @ 22:00) (18 - 18)  SpO2: 97% (02-10-18 @ 22:00) (95% - 98%)  Wt(kg): --    Drug Dosing Weight  Height (cm): 172.72 (09 Feb 2018 20:04)  Weight (kg): 77 (09 Feb 2018 20:04)  BMI (kg/m2): 25.8 (09 Feb 2018 20:04)  BSA (m2): 1.91 (09 Feb 2018 20:04)    I&O's Summary    09 Feb 2018 07:01  -  10 Feb 2018 07:00  --------------------------------------------------------  IN: 240 mL / OUT: 300 mL / NET: -60 mL    10 Feb 2018 07:01  -  11 Feb 2018 05:28  --------------------------------------------------------  IN: 1430 mL / OUT: 1500 mL / NET: -70 mL        Appearance: Normal	  HEENT:   Normal oral mucosa, PERRL, EOMI	  Lymphatic: No lymphadenopathy  Cardiovascular: Normal S1 S2, No JVD, 1/6 RENATO, right chest wall device  Respiratory: Lungs clear to auscultation	  Psychiatry: A & O x 3, Mood & affect appropriate  Gastrointestinal:  Soft, Non-tender, + BS	  Skin: +diffuse black discoloration over upper arms, both legs, No ecchymoses, No cyanosis	  Neurologic: Non-focal  Extremities: Normal range of motion, No clubbing, cyanosis or edema  Vascular: Peripheral pulses palpable 2+ bilaterally    LABS:	 	    CBC Full  -  ( 10 Feb 2018 12:19 )  WBC Count : 8.2 K/uL  Hemoglobin : 7.3 g/dL  Hematocrit : 21.4 %  Platelet Count - Automated : 131 K/uL  Mean Cell Volume : 95.3 fl  Mean Cell Hemoglobin : 32.7 pg  Mean Cell Hemoglobin Concentration : 34.3 gm/dL  Auto Neutrophil # : x  Auto Lymphocyte # : x  Auto Monocyte # : x  Auto Eosinophil # : x  Auto Basophil # : x  Auto Neutrophil % : x  Auto Lymphocyte % : x  Auto Monocyte % : x  Auto Eosinophil % : x  Auto Basophil % : x    02-09    143  |  103  |  47<H>  ----------------------------<  88  4.2   |  25  |  2.42<H>    Ca    9.1      09 Feb 2018 15:01    TPro  5.6<L>  /  Alb  3.1<L>  /  TBili  0.6  /  DBili  x   /  AST  22  /  ALT  18  /  AlkPhos  58  02-09      LIVER FUNCTIONS - ( 09 Feb 2018 15:01 )  Alb: 3.1 g/dL / Pro: 5.6 g/dL / ALK PHOS: 58 U/L / ALT: 18 U/L RC / AST: 22 U/L / GGT: x             EKG: V-Paced    Telemetry: Not on telemetry    CXR: 02/09/2018:  FINDINGS:     Unchanged right chest wall AICD.  The lungs are clear. No pneumothorax.  The cardiomediastinal silhouette is unremarkable.  The visualized osseous structures are unremarkable for age.    IMPRESSION:   Clear lungs.    TTE: Pending      A/P: 83y.o. 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    1. HFrEF - Longstanding cardiomyopathy per patients and notes (dx 1995, original LVEF 40%, most recently improved to ~50%). No hx of CAD, patient denies CABG (and no sternotomy wires on CXR which confirms this)  -Appears dry at this time.   -Please hold Lasix for now  -Cont Coreg 25 mg po BID  -Please order routine TTE  -Please order BNP  -Cont digoxin 0.125 mg po QD (Dig level therapeutic at 1.2)    2. Type II NSTEMI - Suspect demand ischemia with elevated Troponin (0.14) in the setting of GIB with anemia (hgb 6.7, s/p 1U PRBC). Low level troponins may also be present in the setting of CKD, longstanding cardiomyopathy in conjunction with anemia.   -Please check serial cardiac enzymes x 2 sets (Troponin/CKMB)  -Cont to treat underlying medical issues (LGIB, hypothyroidism)  -Consider Transfusion to maintain Hgb>8    3. s/p BIV-AICD - Hx of VT for which patient was on amiodarone (>1 year, patient unclear of duration)  -Follows with Phuc Menendez in our EP clinic, last ICD interrogation 04/2017 with normal function device, no recent VT events  -Routine Interrogation in hospital given recent fall ~4 weeks prior to admission  -Now off amiodarone in the setting of likely amio toxicity  -Cont Coreg as above  -Reportedly on chronic antibiotic therapy for device (?doxycycline on home med list)    4. Persistent AFIB - [id number] =4 , (CHF, HTN,  Age>75,) overall this patient is at moderate-high risk for thromboembolic events  -Cont to hold Apixaban in the setting of recent LGIB, pending EGD/C-Scope  -Rate controlled with Coreg    5. 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 Normal LFT's  -CXR with rml opacity. Consider PFT's, CT-Chest to better evaluation whether amiodarone has affected his lungs as well.  -Cont to hold amiodarone.  -Appreciate endocrinology recommendations. Cont synthyroid, liothyronine as per endocrinology.    6. Cardiovascular Risk Stratification - RCRI =  3 ( CAD, CHF, CKD ).  0 predictors = 0.4%, 1 predictor = 0.9%, 2 predictors = 6.6%, =3 predictors = >11%  - Overall this patient is as high risk (>11%%) for cardiac death, nonfatal myocardial infarction, and nonfatal cardiac arrest perioperatively for this low-moderate risk procedure (EGD/C-Scope)  -Please obtain Routine TTE prior to the planned procedure if case requires general anesthesia.  -Pending repeat serial cardiac enzymes  -Transfuse for Hgb >8  -Hold Lasix as patient appears dry at this time.  -Cont coreg 25 mg po BID through the procedure  -EPS to turn off device therapies if cautery is required.    Thank you for this interesting consult. My team will continue to follow along with you.    Angel Ruiz MD  Cardiology Attending  [facility]   Cell: [phone]  (Cardiology Nocturnist cell number available 7 pm - 7 am every night; available daytime week days for follow-up only; daytime weekends covered by general cardiology consult service)

## 2018-02-12 LAB
ANION GAP SERPL CALC-SCNC: 16 MMOL/L — SIGNIFICANT CHANGE UP (ref 5–17)
BUN SERPL-MCNC: 28 MG/DL — HIGH (ref 7–23)
CALCIUM SERPL-MCNC: 8.5 MG/DL — SIGNIFICANT CHANGE UP (ref 8.4–10.5)
CHLORIDE SERPL-SCNC: 104 MMOL/L — SIGNIFICANT CHANGE UP (ref 96–108)
CO2 SERPL-SCNC: 24 MMOL/L — SIGNIFICANT CHANGE UP (ref 22–31)
CREAT SERPL-MCNC: 1.95 MG/DL — HIGH (ref 0.5–1.3)
GLUCOSE BLDC GLUCOMTR-MCNC: 118 MG/DL — HIGH (ref 70–99)
GLUCOSE BLDC GLUCOMTR-MCNC: 88 MG/DL — SIGNIFICANT CHANGE UP (ref 70–99)
GLUCOSE BLDC GLUCOMTR-MCNC: 89 MG/DL — SIGNIFICANT CHANGE UP (ref 70–99)
GLUCOSE BLDC GLUCOMTR-MCNC: 91 MG/DL — SIGNIFICANT CHANGE UP (ref 70–99)
GLUCOSE SERPL-MCNC: 99 MG/DL — SIGNIFICANT CHANGE UP (ref 70–99)
HCT VFR BLD CALC: 27 % — LOW (ref 39–50)
HGB BLD-MCNC: 8.9 G/DL — LOW (ref 13–17)
MCHC RBC-ENTMCNC: 31.3 PG — SIGNIFICANT CHANGE UP (ref 27–34)
MCHC RBC-ENTMCNC: 32.9 GM/DL — SIGNIFICANT CHANGE UP (ref 32–36)
MCV RBC AUTO: 95.2 FL — SIGNIFICANT CHANGE UP (ref 80–100)
PLATELET # BLD AUTO: 137 K/UL — LOW (ref 150–400)
POTASSIUM SERPL-MCNC: 4 MMOL/L — SIGNIFICANT CHANGE UP (ref 3.5–5.3)
POTASSIUM SERPL-SCNC: 4 MMOL/L — SIGNIFICANT CHANGE UP (ref 3.5–5.3)
RBC # BLD: 2.84 M/UL — LOW (ref 4.2–5.8)
RBC # FLD: 15.4 % — HIGH (ref 10.3–14.5)
SODIUM SERPL-SCNC: 144 MMOL/L — SIGNIFICANT CHANGE UP (ref 135–145)
T3 SERPL-MCNC: 35 NG/DL — LOW (ref 80–200)
T4 AB SER-ACNC: 1.8 UG/DL — LOW (ref 4.6–12)
TSH SERPL-MCNC: 97.05 UIU/ML — HIGH (ref 0.27–4.2)
WBC # BLD: 8.6 K/UL — SIGNIFICANT CHANGE UP (ref 3.8–10.5)
WBC # FLD AUTO: 8.6 K/UL — SIGNIFICANT CHANGE UP (ref 3.8–10.5)

## 2018-02-12 PROCEDURE — 93306 TTE W/DOPPLER COMPLETE: CPT | Mod: 26

## 2018-02-12 RX ORDER — ACETAMINOPHEN 500 MG
2 TABLET ORAL
Qty: 0 | Refills: 0 | COMMUNITY

## 2018-02-12 RX ORDER — MULTIVIT-MIN/FERROUS GLUCONATE 9 MG/15 ML
1 LIQUID (ML) ORAL
Qty: 0 | Refills: 0 | COMMUNITY

## 2018-02-12 RX ORDER — GLUCOSAMINE HCL/CHONDROITIN SU 500-400 MG
1 CAPSULE ORAL
Qty: 0 | Refills: 0 | COMMUNITY

## 2018-02-12 RX ORDER — ALLOPURINOL 300 MG
1 TABLET ORAL
Qty: 0 | Refills: 0 | COMMUNITY

## 2018-02-12 RX ORDER — PANTOPRAZOLE SODIUM 20 MG/1
40 TABLET, DELAYED RELEASE ORAL
Qty: 0 | Refills: 0 | COMMUNITY

## 2018-02-12 RX ORDER — LIOTHYRONINE SODIUM 25 UG/1
10 TABLET ORAL DAILY
Qty: 0 | Refills: 0 | Status: DISCONTINUED | OUTPATIENT
Start: 2018-02-12 | End: 2018-02-15

## 2018-02-12 RX ORDER — OMEGA-3 ACID ETHYL ESTERS 1 G
1 CAPSULE ORAL
Qty: 0 | Refills: 0 | COMMUNITY

## 2018-02-12 RX ORDER — CHOLECALCIFEROL (VITAMIN D3) 125 MCG
1 CAPSULE ORAL
Qty: 0 | Refills: 0 | COMMUNITY

## 2018-02-12 RX ADMIN — Medication 20 MILLIGRAM(S): at 05:56

## 2018-02-12 RX ADMIN — CARVEDILOL PHOSPHATE 25 MILLIGRAM(S): 80 CAPSULE, EXTENDED RELEASE ORAL at 05:56

## 2018-02-12 RX ADMIN — PANTOPRAZOLE SODIUM 40 MILLIGRAM(S): 20 TABLET, DELAYED RELEASE ORAL at 12:48

## 2018-02-12 RX ADMIN — Medication 50 MICROGRAM(S): at 05:56

## 2018-02-12 RX ADMIN — Medication 100 MILLIGRAM(S): at 12:48

## 2018-02-12 RX ADMIN — LIOTHYRONINE SODIUM 5 MICROGRAM(S): 25 TABLET ORAL at 05:56

## 2018-02-12 NOTE — PROCEDURE NOTE - ADDITIONAL PROCEDURE DETAILS
1. Indication for interrogation: Routine check  2Measured data WNL. Sensing and pacing thresholds WNL. Lead impedence stable  Pt IS PM dependent  3. No stored data for review.   4. Normal ICD functioning  5. Changes made: none

## 2018-02-13 LAB
AMIODARONE FLD-MCNC: 0.7 MCG/ML — SIGNIFICANT CHANGE UP
AMIODARONE+DESETH SERPL-MCNC: 0.7 MCG/ML — SIGNIFICANT CHANGE UP
ANION GAP SERPL CALC-SCNC: 9 MMOL/L — SIGNIFICANT CHANGE UP (ref 5–17)
BUN SERPL-MCNC: 29 MG/DL — HIGH (ref 7–23)
CALCIUM SERPL-MCNC: 8.7 MG/DL — SIGNIFICANT CHANGE UP (ref 8.4–10.5)
CHLORIDE SERPL-SCNC: 103 MMOL/L — SIGNIFICANT CHANGE UP (ref 96–108)
CO2 SERPL-SCNC: 28 MMOL/L — SIGNIFICANT CHANGE UP (ref 22–31)
CREAT SERPL-MCNC: 2.26 MG/DL — HIGH (ref 0.5–1.3)
GLUCOSE BLDC GLUCOMTR-MCNC: 109 MG/DL — HIGH (ref 70–99)
GLUCOSE BLDC GLUCOMTR-MCNC: 87 MG/DL — SIGNIFICANT CHANGE UP (ref 70–99)
GLUCOSE BLDC GLUCOMTR-MCNC: 96 MG/DL — SIGNIFICANT CHANGE UP (ref 70–99)
GLUCOSE BLDC GLUCOMTR-MCNC: 99 MG/DL — SIGNIFICANT CHANGE UP (ref 70–99)
GLUCOSE SERPL-MCNC: 89 MG/DL — SIGNIFICANT CHANGE UP (ref 70–99)
HCT VFR BLD CALC: 24.5 % — LOW (ref 39–50)
HGB BLD-MCNC: 8 G/DL — LOW (ref 13–17)
MAGNESIUM SERPL-MCNC: 2.1 MG/DL — SIGNIFICANT CHANGE UP (ref 1.6–2.6)
MCHC RBC-ENTMCNC: 29.9 PG — SIGNIFICANT CHANGE UP (ref 27–34)
MCHC RBC-ENTMCNC: 32.7 GM/DL — SIGNIFICANT CHANGE UP (ref 32–36)
MCV RBC AUTO: 91.4 FL — SIGNIFICANT CHANGE UP (ref 80–100)
PHOSPHATE SERPL-MCNC: 2.6 MG/DL — SIGNIFICANT CHANGE UP (ref 2.5–4.5)
PLATELET # BLD AUTO: 142 K/UL — LOW (ref 150–400)
POTASSIUM SERPL-MCNC: 4.1 MMOL/L — SIGNIFICANT CHANGE UP (ref 3.5–5.3)
POTASSIUM SERPL-SCNC: 4.1 MMOL/L — SIGNIFICANT CHANGE UP (ref 3.5–5.3)
RBC # BLD: 2.68 M/UL — LOW (ref 4.2–5.8)
RBC # FLD: 17.9 % — HIGH (ref 10.3–14.5)
SODIUM SERPL-SCNC: 140 MMOL/L — SIGNIFICANT CHANGE UP (ref 135–145)
WBC # BLD: 6.84 K/UL — SIGNIFICANT CHANGE UP (ref 3.8–10.5)
WBC # FLD AUTO: 6.84 K/UL — SIGNIFICANT CHANGE UP (ref 3.8–10.5)

## 2018-02-13 RX ADMIN — Medication 50 MICROGRAM(S): at 06:29

## 2018-02-13 RX ADMIN — Medication 20 MILLIGRAM(S): at 06:29

## 2018-02-13 RX ADMIN — CARVEDILOL PHOSPHATE 25 MILLIGRAM(S): 80 CAPSULE, EXTENDED RELEASE ORAL at 06:29

## 2018-02-13 RX ADMIN — CARVEDILOL PHOSPHATE 25 MILLIGRAM(S): 80 CAPSULE, EXTENDED RELEASE ORAL at 17:22

## 2018-02-13 RX ADMIN — Medication 100 MILLIGRAM(S): at 12:46

## 2018-02-13 RX ADMIN — LIOTHYRONINE SODIUM 10 MICROGRAM(S): 25 TABLET ORAL at 06:29

## 2018-02-13 RX ADMIN — TAMSULOSIN HYDROCHLORIDE 0.4 MILLIGRAM(S): 0.4 CAPSULE ORAL at 21:54

## 2018-02-13 RX ADMIN — PANTOPRAZOLE SODIUM 40 MILLIGRAM(S): 20 TABLET, DELAYED RELEASE ORAL at 12:46

## 2018-02-13 NOTE — CHART NOTE - NSCHARTNOTEFT_GEN_A_CORE
Approached by patient's Daughter.     Daughter states father had complication with previous AICD. Pt became septic and had device removed but pads on heart were left in place due to worries of dissection with removal. Pt then had cellulitis at the surgical site. Pt informed by Dr. Cunha of [place], he needed to indefinitely be on Doxycycline 100 mg BID. Daughter would like for meds to be restarted. Daughter states they have been made aware of the need and side effects of the medication and long term use.     Spoke with patient himself. Mr. Koehler confirms what daughter states.       Plan:    -Spoke with Dr. Arias who agreed pt can be started back on Doxycyline for chronic suppressive therapy. Doxycyline 100 mg BID.

## 2018-02-14 LAB
ANION GAP SERPL CALC-SCNC: 11 MMOL/L — SIGNIFICANT CHANGE UP (ref 5–17)
BLD GP AB SCN SERPL QL: NEGATIVE — SIGNIFICANT CHANGE UP
BUN SERPL-MCNC: 25 MG/DL — HIGH (ref 7–23)
CALCIUM SERPL-MCNC: 8.5 MG/DL — SIGNIFICANT CHANGE UP (ref 8.4–10.5)
CHLORIDE SERPL-SCNC: 103 MMOL/L — SIGNIFICANT CHANGE UP (ref 96–108)
CO2 SERPL-SCNC: 27 MMOL/L — SIGNIFICANT CHANGE UP (ref 22–31)
CREAT SERPL-MCNC: 1.8 MG/DL — HIGH (ref 0.5–1.3)
GLUCOSE BLDC GLUCOMTR-MCNC: 101 MG/DL — HIGH (ref 70–99)
GLUCOSE BLDC GLUCOMTR-MCNC: 117 MG/DL — HIGH (ref 70–99)
GLUCOSE BLDC GLUCOMTR-MCNC: 86 MG/DL — SIGNIFICANT CHANGE UP (ref 70–99)
GLUCOSE BLDC GLUCOMTR-MCNC: 88 MG/DL — SIGNIFICANT CHANGE UP (ref 70–99)
GLUCOSE SERPL-MCNC: 77 MG/DL — SIGNIFICANT CHANGE UP (ref 70–99)
HCT VFR BLD CALC: 26.7 % — LOW (ref 39–50)
HGB BLD-MCNC: 8.7 G/DL — LOW (ref 13–17)
MCHC RBC-ENTMCNC: 29.9 PG — SIGNIFICANT CHANGE UP (ref 27–34)
MCHC RBC-ENTMCNC: 32.6 GM/DL — SIGNIFICANT CHANGE UP (ref 32–36)
MCV RBC AUTO: 91.8 FL — SIGNIFICANT CHANGE UP (ref 80–100)
PLATELET # BLD AUTO: 138 K/UL — LOW (ref 150–400)
POTASSIUM SERPL-MCNC: 3.8 MMOL/L — SIGNIFICANT CHANGE UP (ref 3.5–5.3)
POTASSIUM SERPL-SCNC: 3.8 MMOL/L — SIGNIFICANT CHANGE UP (ref 3.5–5.3)
RBC # BLD: 2.91 M/UL — LOW (ref 4.2–5.8)
RBC # FLD: 17.7 % — HIGH (ref 10.3–14.5)
RH IG SCN BLD-IMP: POSITIVE — SIGNIFICANT CHANGE UP
SODIUM SERPL-SCNC: 141 MMOL/L — SIGNIFICANT CHANGE UP (ref 135–145)
T4 AB SER-ACNC: 2.5 UG/DL — LOW (ref 4.6–12)
TSH SERPL-MCNC: 80.46 UIU/ML — HIGH (ref 0.27–4.2)
WBC # BLD: 6.82 K/UL — SIGNIFICANT CHANGE UP (ref 3.8–10.5)
WBC # FLD AUTO: 6.82 K/UL — SIGNIFICANT CHANGE UP (ref 3.8–10.5)

## 2018-02-14 PROCEDURE — 99233 SBSQ HOSP IP/OBS HIGH 50: CPT

## 2018-02-14 RX ORDER — DIGOXIN 250 MCG
0.12 TABLET ORAL EVERY OTHER DAY
Qty: 0 | Refills: 0 | Status: DISCONTINUED | OUTPATIENT
Start: 2018-02-15 | End: 2018-02-15

## 2018-02-14 RX ADMIN — TAMSULOSIN HYDROCHLORIDE 0.4 MILLIGRAM(S): 0.4 CAPSULE ORAL at 21:24

## 2018-02-14 RX ADMIN — LIOTHYRONINE SODIUM 10 MICROGRAM(S): 25 TABLET ORAL at 05:04

## 2018-02-14 RX ADMIN — CARVEDILOL PHOSPHATE 25 MILLIGRAM(S): 80 CAPSULE, EXTENDED RELEASE ORAL at 05:05

## 2018-02-14 RX ADMIN — Medication 100 MILLIGRAM(S): at 11:42

## 2018-02-14 RX ADMIN — Medication 100 MILLIGRAM(S): at 17:10

## 2018-02-14 RX ADMIN — Medication 50 MICROGRAM(S): at 05:05

## 2018-02-14 RX ADMIN — PANTOPRAZOLE SODIUM 40 MILLIGRAM(S): 20 TABLET, DELAYED RELEASE ORAL at 11:42

## 2018-02-14 RX ADMIN — CARVEDILOL PHOSPHATE 25 MILLIGRAM(S): 80 CAPSULE, EXTENDED RELEASE ORAL at 17:10

## 2018-02-14 RX ADMIN — Medication 100 MILLIGRAM(S): at 05:04

## 2018-02-14 RX ADMIN — Medication 20 MILLIGRAM(S): at 05:04

## 2018-02-14 NOTE — PROGRESS NOTE ADULT - PROBLEM SELECTOR PROBLEM 5
Hypothyroidism (acquired)

## 2018-02-14 NOTE — PROGRESS NOTE ADULT - PROBLEM SELECTOR PROBLEM 3
Coronary artery disease involving coronary bypass graft of native heart without angina pectoris

## 2018-02-14 NOTE — PROGRESS NOTE ADULT - PROBLEM SELECTOR PROBLEM 2
Chronic atrial fibrillation
Iron deficiency anemia due to chronic blood loss
Chronic atrial fibrillation

## 2018-02-14 NOTE — PROGRESS NOTE ADULT - PROBLEM SELECTOR PLAN 5
Could be from Amioodarone. Endocrine eval called, Dr Min will see him
Could be from Amiodarone. Endocrine eval noted, started on Synthroid, cytomel. Dose adjusted.
Could be from Amioodarone. Endocrine eval noted, started on Synthroid, cytomel

## 2018-02-14 NOTE — PROGRESS NOTE ADULT - ASSESSMENT
Patient is an 82 y/o amiodarone treated admitted for increasing fatigue and SOB for about one month noted with severe hypothyroidism.    No family history, no recent contrast dye, no recent URI or neck pain- suspicious for amiodarone induced hypothyroidism.    Patient with cardiac history- both CAD/CHF and AF.  CRF- new? Ca. 9.1 renal noted.  Severely hypothyroid, but no myxedema.  Normal PM cortisol.    Will need to start treatment gradually, as patient alert will start PO with T4 and very low T3, due to amiodarone effect to prevent T4 to T3 conversion.    Day#4 of treatment, no complaints, TSH down to 80, T4 up to 2.5, acceptable response showing GI absorption of medications.    Can be d/chris with the plan to gradually increase synthroid dose as below.
Patient is an 84 y/o amiodarone treated admitted for increasing fatigue and SOB for about one month noted with severe hypothyroidism.    No family history, no recent contrast dye, no recent URI or neck pain- suspicious for amiodarone induced hypothyroidism.    Patient with cardiac history- both CAD/CHF and AF.  CRF- new? Ca. 9.1 renal noted.  Severely hypothyroid, but no myxedema.  Normal PM cortisol.    Will need to start treatment gradually, as patient alert will start PO with T4 and very low T3, due to amiodarone effect to prevent T4 to T3 conversion.    Day#3 of treatment, no complaints, no significant changes in TFT's, Question about cardiac ischemia with increased troponin. Although mild hypothyroidism has not been shown to increase Troponin, elevations are reported with severe hypothyroidism. However, as patient is stable and was transfused, I would prefer a slow thyroid hormone replacement. The question is GI absorption of thyroid hormones in severe hypothyroidism- I would like to see some response to treatment PO.     As amiodarone had a large VD it will take 6 weeks to clear- therefore treatment with amiodarone is not contraindicated and can be restarted if needed.

## 2018-02-14 NOTE — PROGRESS NOTE ADULT - PROBLEM SELECTOR PLAN 3
Repeat Trop.  Needs to be transferred to telemetry. Cardiology eval noted, will need to R/o acute SE MI although trop elevation with CKD and demand due to anemia is also clinically possible.
Repeat trop also elevated. FU echo findings. Cardiology is following
Suspect an NSTEMI.  Cardiology follow-up noted
Suspect an NSTEMI.  Cardiology has to follow-up.
No angina, stable for now

## 2018-02-14 NOTE — PROGRESS NOTE ADULT - PROBLEM SELECTOR PLAN 1
CBC has improved H/H after transfusion..
Continue synthroid 50 mcg PO for 1 week (till 2/17)  2/18-2/25 synthroid 75 mcg daily.  2/26 on synthroid 88 mcg daily.  Continue cytomel 10 mcg daily throughout.  Repeat TFT's in 4 weeks, follow-up at that time.    Increase cytomel to 10 mcg daily.  Repeat TFT's Wednesday.
Eliquis and Ecotrin held. GI eval noted. check stool for OB
FU stool for OB, CBC is stable so far
Progress To 8.0.  Given His Cardiac History and Demand Ischemia Suspicion, One Unit of Blood Transfusion Will Be Given.
monitor cbc   transfuse prn  cardiology eval noted  egd on tuesday pending TTE
monitor cbc   transfuse prn  cardiology eval noted  egd/colon possible on wednesday
monitor cbc   transfuse prn  cardiology eval noted  unlikely to have acute blood loss from gi bleed  anemia likely related to hypothyroid  will hold off on endoscopic eval   d/w patient
monitor cbc   transfuse prn  cardiology eval noted  unlikely to have acute blood loss from gi bleed  anemia likely related to hypothyroid  will hold off on endoscopic eval   d/w patient
Continue synthroid 50 mcg PO.  Increase cytomel to 10 mcg daily.  Repeat TFT's Wednesday.
Eliquis and Ecotrin held. ALEJANDRO morrow noted. Will transfuse 1 U PRBC for now

## 2018-02-14 NOTE — PROGRESS NOTE ADULT - PROBLEM SELECTOR PLAN 2
Monitor HR, Dig held for now, FU levels. Off amio
Monitor HR, Dig restarted as of now, FU levels. Off amio
as above
Monitor HR, Dig held for now, FU levels. Amio stopped due to hypothyroidism

## 2018-02-14 NOTE — PROGRESS NOTE ADULT - PROBLEM SELECTOR PROBLEM 4
Benign essential hypertension

## 2018-02-14 NOTE — PROGRESS NOTE ADULT - PROBLEM SELECTOR PROBLEM 1
Acquired hypothyroidism
Iron deficiency anemia due to chronic blood loss
Rectal bleed
Acquired hypothyroidism
Iron deficiency anemia due to chronic blood loss

## 2018-02-15 ENCOUNTER — TRANSCRIPTION ENCOUNTER (OUTPATIENT)
Age: 83
End: 2018-02-15

## 2018-02-15 VITALS
HEART RATE: 64 BPM | OXYGEN SATURATION: 96 % | RESPIRATION RATE: 18 BRPM | DIASTOLIC BLOOD PRESSURE: 62 MMHG | TEMPERATURE: 98 F | SYSTOLIC BLOOD PRESSURE: 122 MMHG

## 2018-02-15 LAB
ANION GAP SERPL CALC-SCNC: 11 MMOL/L — SIGNIFICANT CHANGE UP (ref 5–17)
BASOPHILS # BLD AUTO: 0.04 K/UL — SIGNIFICANT CHANGE UP (ref 0–0.2)
BASOPHILS NFR BLD AUTO: 0.6 % — SIGNIFICANT CHANGE UP (ref 0–2)
BUN SERPL-MCNC: 20 MG/DL — SIGNIFICANT CHANGE UP (ref 7–23)
CALCIUM SERPL-MCNC: 8.5 MG/DL — SIGNIFICANT CHANGE UP (ref 8.4–10.5)
CHLORIDE SERPL-SCNC: 103 MMOL/L — SIGNIFICANT CHANGE UP (ref 96–108)
CO2 SERPL-SCNC: 28 MMOL/L — SIGNIFICANT CHANGE UP (ref 22–31)
CREAT SERPL-MCNC: 1.8 MG/DL — HIGH (ref 0.5–1.3)
EOSINOPHIL # BLD AUTO: 0.3 K/UL — SIGNIFICANT CHANGE UP (ref 0–0.5)
EOSINOPHIL NFR BLD AUTO: 4.4 % — SIGNIFICANT CHANGE UP (ref 0–6)
GLUCOSE BLDC GLUCOMTR-MCNC: 104 MG/DL — HIGH (ref 70–99)
GLUCOSE BLDC GLUCOMTR-MCNC: 141 MG/DL — HIGH (ref 70–99)
GLUCOSE BLDC GLUCOMTR-MCNC: 87 MG/DL — SIGNIFICANT CHANGE UP (ref 70–99)
GLUCOSE SERPL-MCNC: 81 MG/DL — SIGNIFICANT CHANGE UP (ref 70–99)
HCT VFR BLD CALC: 29.5 % — LOW (ref 39–50)
HGB BLD-MCNC: 9.6 G/DL — LOW (ref 13–17)
IMM GRANULOCYTES NFR BLD AUTO: 0.3 % — SIGNIFICANT CHANGE UP (ref 0–1.5)
LYMPHOCYTES # BLD AUTO: 2.03 K/UL — SIGNIFICANT CHANGE UP (ref 1–3.3)
LYMPHOCYTES # BLD AUTO: 29.7 % — SIGNIFICANT CHANGE UP (ref 13–44)
MCHC RBC-ENTMCNC: 30.4 PG — SIGNIFICANT CHANGE UP (ref 27–34)
MCHC RBC-ENTMCNC: 32.5 GM/DL — SIGNIFICANT CHANGE UP (ref 32–36)
MCV RBC AUTO: 93.4 FL — SIGNIFICANT CHANGE UP (ref 80–100)
MONOCYTES # BLD AUTO: 0.48 K/UL — SIGNIFICANT CHANGE UP (ref 0–0.9)
MONOCYTES NFR BLD AUTO: 7 % — SIGNIFICANT CHANGE UP (ref 2–14)
NEUTROPHILS # BLD AUTO: 3.96 K/UL — SIGNIFICANT CHANGE UP (ref 1.8–7.4)
NEUTROPHILS NFR BLD AUTO: 58 % — SIGNIFICANT CHANGE UP (ref 43–77)
PLATELET # BLD AUTO: 143 K/UL — LOW (ref 150–400)
POTASSIUM SERPL-MCNC: 3.7 MMOL/L — SIGNIFICANT CHANGE UP (ref 3.5–5.3)
POTASSIUM SERPL-SCNC: 3.7 MMOL/L — SIGNIFICANT CHANGE UP (ref 3.5–5.3)
PROT ?TM UR-MCNC: <4 MG/DL — SIGNIFICANT CHANGE UP (ref 0–12)
RBC # BLD: 3.16 M/UL — LOW (ref 4.2–5.8)
RBC # FLD: 17.2 % — HIGH (ref 10.3–14.5)
SODIUM SERPL-SCNC: 142 MMOL/L — SIGNIFICANT CHANGE UP (ref 135–145)
WBC # BLD: 6.83 K/UL — SIGNIFICANT CHANGE UP (ref 3.8–10.5)
WBC # FLD AUTO: 6.83 K/UL — SIGNIFICANT CHANGE UP (ref 3.8–10.5)

## 2018-02-15 PROCEDURE — 85027 COMPLETE CBC AUTOMATED: CPT

## 2018-02-15 PROCEDURE — 83605 ASSAY OF LACTIC ACID: CPT

## 2018-02-15 PROCEDURE — 84295 ASSAY OF SERUM SODIUM: CPT

## 2018-02-15 PROCEDURE — 93005 ELECTROCARDIOGRAM TRACING: CPT

## 2018-02-15 PROCEDURE — 82533 TOTAL CORTISOL: CPT

## 2018-02-15 PROCEDURE — 80162 ASSAY OF DIGOXIN TOTAL: CPT

## 2018-02-15 PROCEDURE — 86334 IMMUNOFIX E-PHORESIS SERUM: CPT

## 2018-02-15 PROCEDURE — 93306 TTE W/DOPPLER COMPLETE: CPT

## 2018-02-15 PROCEDURE — 80151 DRUG ASSAY AMIODARONE: CPT

## 2018-02-15 PROCEDURE — 82803 BLOOD GASES ANY COMBINATION: CPT

## 2018-02-15 PROCEDURE — 80048 BASIC METABOLIC PNL TOTAL CA: CPT

## 2018-02-15 PROCEDURE — 85014 HEMATOCRIT: CPT

## 2018-02-15 PROCEDURE — 86901 BLOOD TYPING SEROLOGIC RH(D): CPT

## 2018-02-15 PROCEDURE — 84443 ASSAY THYROID STIM HORMONE: CPT

## 2018-02-15 PROCEDURE — 36430 TRANSFUSION BLD/BLD COMPNT: CPT

## 2018-02-15 PROCEDURE — 82553 CREATINE MB FRACTION: CPT

## 2018-02-15 PROCEDURE — 82435 ASSAY OF BLOOD CHLORIDE: CPT

## 2018-02-15 PROCEDURE — 86850 RBC ANTIBODY SCREEN: CPT

## 2018-02-15 PROCEDURE — 82330 ASSAY OF CALCIUM: CPT

## 2018-02-15 PROCEDURE — 71045 X-RAY EXAM CHEST 1 VIEW: CPT

## 2018-02-15 PROCEDURE — 86335 IMMUNFIX E-PHORSIS/URINE/CSF: CPT

## 2018-02-15 PROCEDURE — 83735 ASSAY OF MAGNESIUM: CPT

## 2018-02-15 PROCEDURE — 97161 PT EVAL LOW COMPLEX 20 MIN: CPT

## 2018-02-15 PROCEDURE — 86923 COMPATIBILITY TEST ELECTRIC: CPT

## 2018-02-15 PROCEDURE — 84100 ASSAY OF PHOSPHORUS: CPT

## 2018-02-15 PROCEDURE — 80053 COMPREHEN METABOLIC PANEL: CPT

## 2018-02-15 PROCEDURE — 81001 URINALYSIS AUTO W/SCOPE: CPT

## 2018-02-15 PROCEDURE — 84132 ASSAY OF SERUM POTASSIUM: CPT

## 2018-02-15 PROCEDURE — P9016: CPT

## 2018-02-15 PROCEDURE — 84436 ASSAY OF TOTAL THYROXINE: CPT

## 2018-02-15 PROCEDURE — 86900 BLOOD TYPING SEROLOGIC ABO: CPT

## 2018-02-15 PROCEDURE — 82947 ASSAY GLUCOSE BLOOD QUANT: CPT

## 2018-02-15 PROCEDURE — 82962 GLUCOSE BLOOD TEST: CPT

## 2018-02-15 PROCEDURE — 99285 EMERGENCY DEPT VISIT HI MDM: CPT | Mod: 25

## 2018-02-15 PROCEDURE — P9011: CPT

## 2018-02-15 PROCEDURE — 84484 ASSAY OF TROPONIN QUANT: CPT

## 2018-02-15 PROCEDURE — 84480 ASSAY TRIIODOTHYRONINE (T3): CPT

## 2018-02-15 PROCEDURE — 84156 ASSAY OF PROTEIN URINE: CPT

## 2018-02-15 RX ORDER — DIGOXIN 250 MCG
1 TABLET ORAL
Qty: 0 | Refills: 0 | DISCHARGE
Start: 2018-02-15

## 2018-02-15 RX ORDER — LEVOTHYROXINE SODIUM 125 MCG
1 TABLET ORAL
Qty: 30 | Refills: 0
Start: 2018-02-15 | End: 2018-03-16

## 2018-02-15 RX ORDER — LEVOTHYROXINE SODIUM 125 MCG
1 TABLET ORAL
Qty: 0 | Refills: 0 | COMMUNITY
Start: 2018-02-15

## 2018-02-15 RX ORDER — LIOTHYRONINE SODIUM 25 UG/1
2 TABLET ORAL
Qty: 0 | Refills: 0 | COMMUNITY
Start: 2018-02-15

## 2018-02-15 RX ORDER — LEVOTHYROXINE SODIUM 125 MCG
1 TABLET ORAL
Qty: 2 | Refills: 0
Start: 2018-02-15 | End: 2018-02-16

## 2018-02-15 RX ORDER — LIOTHYRONINE SODIUM 25 UG/1
2 TABLET ORAL
Qty: 60 | Refills: 0
Start: 2018-02-15 | End: 2018-03-16

## 2018-02-15 RX ORDER — AMIODARONE HYDROCHLORIDE 400 MG/1
1 TABLET ORAL
Qty: 0 | Refills: 0 | COMMUNITY

## 2018-02-15 RX ORDER — PANTOPRAZOLE SODIUM 20 MG/1
1 TABLET, DELAYED RELEASE ORAL
Qty: 0 | Refills: 0 | COMMUNITY

## 2018-02-15 RX ADMIN — Medication 50 MICROGRAM(S): at 05:21

## 2018-02-15 RX ADMIN — CARVEDILOL PHOSPHATE 25 MILLIGRAM(S): 80 CAPSULE, EXTENDED RELEASE ORAL at 17:47

## 2018-02-15 RX ADMIN — PANTOPRAZOLE SODIUM 40 MILLIGRAM(S): 20 TABLET, DELAYED RELEASE ORAL at 11:23

## 2018-02-15 RX ADMIN — Medication 0.12 MILLIGRAM(S): at 11:23

## 2018-02-15 RX ADMIN — Medication 20 MILLIGRAM(S): at 05:21

## 2018-02-15 RX ADMIN — Medication 100 MILLIGRAM(S): at 17:47

## 2018-02-15 RX ADMIN — Medication 100 MILLIGRAM(S): at 11:23

## 2018-02-15 RX ADMIN — LIOTHYRONINE SODIUM 10 MICROGRAM(S): 25 TABLET ORAL at 05:21

## 2018-02-15 RX ADMIN — Medication 100 MILLIGRAM(S): at 05:21

## 2018-02-15 RX ADMIN — CARVEDILOL PHOSPHATE 25 MILLIGRAM(S): 80 CAPSULE, EXTENDED RELEASE ORAL at 05:21

## 2018-02-15 NOTE — PROGRESS NOTE ADULT - SUBJECTIVE AND OBJECTIVE BOX
Chief Complaint: Patient is an 84 y/o amiodarone treated admitted for increasing fatigue and SOB for about one month noted with severe hypothyroidism    Day # 4 for synthroid 50 mcg and cytomel 10 mcg daily.  Off amiodarone.  TFT's improving gradually.  No palpitations, no tremor.    MEDICATIONS  (STANDING):  allopurinol 100 milliGRAM(s) Oral daily  carvedilol 25 milliGRAM(s) Oral every 12 hours  doxycycline hyclate Capsule 100 milliGRAM(s) Oral every 12 hours  furosemide    Tablet 20 milliGRAM(s) Oral daily  insulin lispro (HumaLOG) corrective regimen sliding scale   SubCutaneous three times a day before meals  insulin lispro (HumaLOG) corrective regimen sliding scale   SubCutaneous at bedtime  levothyroxine 50 MICROGram(s) Oral daily  liothyronine 10 MICROGram(s) Oral daily  pantoprazole  Injectable 40 milliGRAM(s) IV Push daily  tamsulosin 0.4 milliGRAM(s) Oral at bedtime    MEDICATIONS  (PRN):      Allergies    Bactrim (Rash)  cefadroxil (Hives)  Levaquin (Rash)  Lipitor (Rash)    Intolerances        PHYSICAL EXAM:  VITALS: T(C): 36.4 (02-14-18 @ 11:19)  T(F): 97.6 (02-14-18 @ 11:19), Max: 98.6 (02-14-18 @ 05:03)  HR: 67 (02-14-18 @ 17:08) (67 - 73)  BP: 115/69 (02-14-18 @ 17:08) (97/62 - 124/69)  RR:  (17 - 18)  SpO2:  (95% - 100%)  GENERAL: NAD, alert and responsive  EYES: No proptosis,  HEENT:  Atraumatic, Normocephalic,   THYROID: Normal size, no palpable nodules  RESPIRATORY: Clear to auscultation bilaterally  CARDIOVASCULAR: Regular rhythm; No murmurs; no peripheral edema  GI: Soft, nontender, non distended, normal bowel sounds  SKIN: Dry, intact, diffuse ecchymosis  MUSCULOSKELETAL: decreaased strength  NEURO: extraocular movements intact, no tremor, reduced reflexes  PSYCH: Alert and oriented x 3, normal affect, normal mood      POCT Blood Glucose.: 86 mg/dL (02-14-18 @ 16:32)  POCT Blood Glucose.: 117 mg/dL (02-14-18 @ 11:42)  POCT Blood Glucose.: 88 mg/dL (02-14-18 @ 07:59)  POCT Blood Glucose.: 99 mg/dL (02-13-18 @ 21:21)  POCT Blood Glucose.: 96 mg/dL (02-13-18 @ 16:50)  POCT Blood Glucose.: 109 mg/dL (02-13-18 @ 11:57)  POCT Blood Glucose.: 87 mg/dL (02-13-18 @ 07:49)  POCT Blood Glucose.: 88 mg/dL (02-12-18 @ 21:35)  POCT Blood Glucose.: 91 mg/dL (02-12-18 @ 16:45)  POCT Blood Glucose.: 118 mg/dL (02-12-18 @ 11:53)  POCT Blood Glucose.: 89 mg/dL (02-12-18 @ 07:38)  POCT Blood Glucose.: 118 mg/dL (02-11-18 @ 21:11)                            8.7    6.82  )-----------( 138      ( 14 Feb 2018 07:35 )             26.7       02-14    141  |  103  |  25<H>  ----------------------------<  77  3.8   |  27  |  1.80<H>    EGFR if : 40<L>  EGFR if non : 34<L>    Ca    8.5      02-14  Mg     2.1     02-13  Phos  2.6     02-13        Thyroid Function Tests:  02-14 @ 07:49 TSH 80.46 FreeT4 -- T3 -- Anti TPO -- Anti Thyroglobulin Ab -- TSI --  02-12 @ 07:49 TSH 97.05 FreeT4 -- T3 35 Anti TPO -- Anti Thyroglobulin Ab -- TSI --  T4, Serum: 2.5 ug/dL (02.14.18 @ 07:49)
Cardiology Attending Progress Note    CHIEF COMPLAINT/REASON FOR CONSULT: Pre-op risk stratification, HFrEF    HISTORY OF PRESENT ILLNESS:    83y.o. 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 At the time of my visit he reports feeling weak overall, but denies recent chest pain. At rest he has no SOB, but does feel SOB with some exertion. He typically walks with walker. No recent dizziness/HA. No palpitations. No N/V/D/F/C. Prior to admission he report progressive weakness over the last, with a fall in his living room after his legs gave out approximately 4 weeks ago. 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     Interval Events:  Patient seen and examined. Overall he states that he fells well. No Chest Pain. No SOB. No HA/Dizziness. No Palpitations. No N/V/D/F/C.  -Now off amiodarone, on therapy for profound hypothyroidism    Allergies    Bactrim (Rash)  cefadroxil (Hives)  Levaquin (Rash)  Lipitor (Rash)    Intolerances    MEDICATIONS:  carvedilol 25 milliGRAM(s) Oral every 12 hours  furosemide    Tablet 20 milliGRAM(s) Oral daily  tamsulosin 0.4 milliGRAM(s) Oral at bedtime  doxycycline hyclate Capsule 100 milliGRAM(s) Oral every 12 hours  pantoprazole  Injectable 40 milliGRAM(s) IV Push daily  allopurinol 100 milliGRAM(s) Oral daily  insulin lispro (HumaLOG) corrective regimen sliding scale   SubCutaneous three times a day before meals  insulin lispro (HumaLOG) corrective regimen sliding scale   SubCutaneous at bedtime  levothyroxine 50 MICROGram(s) Oral daily  liothyronine 10 MICROGram(s) Oral daily        PAST MEDICAL & SURGICAL HISTORY:  Atrial fibrillation  Hypertension  BPH (benign prostatic hyperplasia)  CAD (coronary artery disease)  AICD (automatic cardioverter/defibrillator) present  Cardiac pacemaker      FAMILY HISTORY:  No pertinent family history in first degree relatives    SOCIAL HISTORY:    Remote former smoker, no ETOH, no IVDU  -Lives with Son and Wife (both nurses)  -Daughter is an NP at [employer]    REVIEW OF SYSTEMS:    CONSTITUTIONAL: No weakness, fevers or chills  EYES/ENT: No visual changes;  No vertigo or throat pain   NECK: No pain or stiffness  RESPIRATORY: No cough, wheezing, hemoptysis; No shortness of breath  CARDIOVASCULAR: No chest pain or palpitations  GASTROINTESTINAL: No abdominal or epigastric pain. No nausea, vomiting, or hematemesis; No diarrhea or constipation. No melena or hematochezia.  GENITOURINARY: No dysuria, frequency or hematuria  NEUROLOGICAL: No numbness or weakness  SKIN: No itching, burning, rashes, or lesions   All other review of systems is negative unless indicated above.      PHYSICAL EXAM:  T(C): 36.4 (02-14-18 @ 11:19), Max: 37 (02-14-18 @ 05:03)  HR: 67 (02-14-18 @ 11:19) (66 - 73)  BP: 103/66 (02-14-18 @ 11:19) (97/62 - 124/69)  RR: 17 (02-14-18 @ 11:19) (17 - 18)  SpO2: 95% (02-14-18 @ 11:19) (95% - 100%)  Wt(kg): --  I&O's Summary    13 Feb 2018 07:01  -  14 Feb 2018 07:00  --------------------------------------------------------  IN: 540 mL / OUT: 2325 mL / NET: -1785 mL    14 Feb 2018 07:01  -  14 Feb 2018 14:04  --------------------------------------------------------  IN: 740 mL / OUT: 1200 mL / NET: -460 mL    Appearance: Normal	  HEENT:   Normal oral mucosa, PERRL, EOMI	  Lymphatic: No lymphadenopathy  Cardiovascular: Normal S1 S2, No JVD, 1/6 RENATO, right chest wall device  Respiratory: Lungs clear to auscultation	  Psychiatry: A & O x 3, Mood & affect appropriate  Gastrointestinal:  Soft, Non-tender, + BS	  Skin: +diffuse black discoloration over upper arms, both legs, No ecchymoses, No cyanosis	  Neurologic: Non-focal  Extremities: Normal range of motion, No clubbing, cyanosis or edema  Vascular: Peripheral pulses palpable 2+ bilaterally    LABS:	 	    CBC Full  -  ( 14 Feb 2018 07:35 )  WBC Count : 6.82 K/uL  Hemoglobin : 8.7 g/dL  Hematocrit : 26.7 %  Platelet Count - Automated : 138 K/uL  Mean Cell Volume : 91.8 fl  Mean Cell Hemoglobin : 29.9 pg  Mean Cell Hemoglobin Concentration : 32.6 gm/dL  Auto Neutrophil # : x  Auto Lymphocyte # : x  Auto Monocyte # : x  Auto Eosinophil # : x  Auto Basophil # : x  Auto Neutrophil % : x  Auto Lymphocyte % : x  Auto Monocyte % : x  Auto Eosinophil % : x  Auto Basophil % : x    02-14    141  |  103  |  25<H>  ----------------------------<  77  3.8   |  27  |  1.80<H>  02-13    140  |  103  |  29<H>  ----------------------------<  89  4.1   |  28  |  2.26<H>    Ca    8.5      14 Feb 2018 07:49  Ca    8.7      13 Feb 2018 07:43  Phos  2.6     02-13  Mg     2.1     02-13    TSH: Thyroid Stimulating Hormone, Serum: 80.46 uIU/mL (02-14 @ 07:49)    EKG: V-Paced    Telemetry: V-Paced, no events    CXR: 02/09/2018:  FINDINGS:     Unchanged right chest wall AICD.  The lungs are clear. No pneumothorax.  The cardiomediastinal silhouette is unremarkable.  The visualized osseous structures are unremarkable for age.    IMPRESSION:   Clear lungs.    TTE: 02/12/2018:  EF (Visual Estimate): 50-55 %  Doppler Peak Velocity (m/sec): AoV=2.5  ------------------------------------------------------------------------  Observations:  Mitral Valve: Thickened mitral valve. Mild mitral  regurgitation.  Aortic Valve/Aorta: Calcified trileaflet aortic valve with  decreased opening. Peak transaortic valve gradient equals  21 mm Hg, mean transaortic valve gradient equals 10 mm Hg,  aortic valve velocity time integral equals 40 cm,  consistent with mild aortic stenosis. Peak left ventricular  outflow tract gradient equals 1 mm Hg, mean gradient is  equal to 1 mm Hg, LVOT velocity time integral equals 11 cm.  Aortic Root: 3.2 cm.  Left Atrium: Severely dilated left atrium.  LA volume index  = 57 cc/m2.  Left Ventricle: Mild global left ventricular systolic  dysfunction. Visual estimation 50-55%.  Septal motion is  consistent with RV pacing. Moderate concentric left  ventricular hypertrophy. Severe diastolic dysfunction  (Stage III).  Right Heart: Moderate right atrial enlargement. Right  ventricular enlargement with decreased right ventricular  systolic function. A device wire is noted in the right  heart. Normal tricuspid valve. Mild tricuspid  regurgitation. Minimal pulmonic regurgitation.  Pericardium/Pleura: Normal pericardium with no pericardial  effusion.  Hemodynamic: Estimated right atrial pressure is 8 mm Hg.  Estimated right ventricular systolic pressure equals 39 mm  Hg, assuming right atrial pressure equals 8 mm Hg,  consistent with borderline pulmonary hypertension.  ------------------------------------------------------------------------  Conclusions:  1. Calcified trileaflet aortic valve with decreased  opening. Mean transaortic valve gradient equals 10 mm Hg,  aortic valve velocity time integral equals 40 cm,  consistent with mild aortic stenosis.  2. Severely dilated left atrium.  LA volume index = 57  cc/m2.  3. Moderate concentric left ventricular hypertrophy.  4. Mild global left ventricular systolic dysfunction.  Visual estimation 50-55%.  5. Moderate right atrial enlargement.  6. Right ventricular enlargement with decreased right  ventricular systolic function. A device wire is noted in  the right heart.        A/P: 83y.o. 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    1. HFrEF - Longstanding cardiomyopathy(dx 1995, TTE 02/12/2018 with mild LV dysfunction LVEF 50-55%, RV enlargement with decreased RV function, mild AS, mild MR ).   -Appears euvolemic at this time  -Cont Lasix 20 mg po QD  -Cont Coreg 25 mg po BID  -Resume digoxin 0.125 mg po every other day (Dig level therapeutic at 1.2) given renal dysfunction.    2. s/p Type II NSTEMI - Suspect demand ischemia with elevated Troponin (0.14 -> 0.13 -> 0.12) in the setting of GIB with anemia (hgb 6.7, s/p 1U PRBC). Low level troponins may also be present in the setting of CKD, longstanding cardiomyopathy in conjunction with anemia.   -Cardiac enzymes now downtrended  -Cont to treat underlying medical issues (Anemia, GIB, hypothyroidism)  -Hgb now improved to 8.7 today  -Off OAC in the setting of recent rectal bleeding    3. s/p BIV-AICD - Hx of VT for which patient was on amiodarone (>1 year, patient unclear of duration)  -Follows with Phuc Menendez in our EP clinic,   -ICD interrogation 02/12/2018 with normal function device, no recent VT events  -Now off amiodarone in the setting of likely amio toxicity  -Cont Coreg as above  -Now back on chronic doxycycline therapy for prior device infection at the patients request.    4. Persistent AFIB - [id number] =4 , (CHF, HTN,  Age>75,) overall this patient is at moderate-high risk for thromboembolic events  -Cont to hold Apixaban in the setting of recent LGIB, pending EGD/C-Scope.  -Rate controlled with Coreg    5. 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 Normal LFT's  -CXR with rml opacity. Consider PFT's, CT-Chest to better evaluation whether amiodarone has affected his lungs as well.  -Cont to hold amiodarone.  -Appreciate endocrinology recommendations.   -Cont synthroid liothyronine as per endocrinology.    6. Cardiovascular Risk Stratification - RCRI =  3 ( CAD, CHF, CKD ).  0 predictors = 0.4%, 1 predictor = 0.9%, 2 predictors = 6.6%, =3 predictors = >11%  - Overall this patient is as high risk (>11%%) for cardiac death, nonfatal myocardial infarction, and nonfatal cardiac arrest perioperatively for this low-moderate risk procedure (EGD/C-Scope)  -Cont coreg 25 mg po BID through the procedure.  -EPS to turn off device therapies if cautery is required.  -No further diagnostic or interventional procedures are required prior to the planned procedure.    Thank you for this interesting consult. My team will continue to follow along with you.    Angel Ruiz MD  Cardiology Attending  [facility]   Cell: [phone]  (Cardiology Nocturnist cell number available 7 pm - 7 am every night; available daytime week days for follow-up only; daytime weekends covered by general cardiology consult service)
Chief Complaint: Patient is an 84 y/o amiodarone treated admitted for increasing fatigue and SOB for about one month noted with severe hypothyroidism    Day#3 of synthroid/cytomel treatment.  Still SOB, no chest pain.  Patient had PRBC transfusion Hb>8.0.  No palpitations.    MEDICATIONS  (STANDING):  allopurinol 100 milliGRAM(s) Oral daily  carvedilol 25 milliGRAM(s) Oral every 12 hours  furosemide    Tablet 20 milliGRAM(s) Oral daily  insulin lispro (HumaLOG) corrective regimen sliding scale   SubCutaneous three times a day before meals  insulin lispro (HumaLOG) corrective regimen sliding scale   SubCutaneous at bedtime  levothyroxine 50 MICROGram(s) Oral daily  liothyronine 5 MICROGram(s) Oral daily  pantoprazole  Injectable 40 milliGRAM(s) IV Push daily  tamsulosin 0.4 milliGRAM(s) Oral at bedtime    MEDICATIONS  (PRN):      Allergies    Bactrim (Rash)  cefadroxil (Hives)  Levaquin (Rash)  Lipitor (Rash)    Intolerances        PHYSICAL EXAM:  VITALS: T(C): 36.7 (02-12-18 @ 11:16)  T(F): 98 (02-12-18 @ 11:16), Max: 98.2 (02-11-18 @ 21:21)  HR: 69 (02-12-18 @ 17:22) (65 - 78)  BP: 96/55 (02-12-18 @ 17:22) (89/57 - 109/71)  RR:  (16 - 18)  SpO2:  (94% - 100%)  GENERAL: NAD,   EYES: No proptosis,  HEENT:  Atraumatic, Normocephalic,   THYROID: Normal size, no palpable nodules, no LN  RESPIRATORY: Clear to auscultation bilaterally  CARDIOVASCULAR: Regular rhythm; No murmurs; no peripheral edema  GI: Soft, nontender, non distended, normal bowel sounds  SKIN: Dry, intact, No rashes or lesions  MUSCULOSKELETAL: decreased strength  NEURO: extraocular movements intact, no tremor,reduced reflexes  PSYCH: Alert and oriented x 3, normal affect, normal mood      POCT Blood Glucose.: 91 mg/dL (02-12-18 @ 16:45)  POCT Blood Glucose.: 118 mg/dL (02-12-18 @ 11:53)  POCT Blood Glucose.: 89 mg/dL (02-12-18 @ 07:38)  POCT Blood Glucose.: 118 mg/dL (02-11-18 @ 21:11)  POCT Blood Glucose.: 117 mg/dL (02-11-18 @ 17:27)  POCT Blood Glucose.: 132 mg/dL (02-11-18 @ 12:50)  POCT Blood Glucose.: 118 mg/dL (02-11-18 @ 08:47)  POCT Blood Glucose.: 88 mg/dL (02-10-18 @ 21:42)  POCT Blood Glucose.: 126 mg/dL (02-10-18 @ 17:43)                            8.9    8.6   )-----------( 137      ( 12 Feb 2018 11:18 )             27.0       02-12    144  |  104  |  28<H>  ----------------------------<  99  4.0   |  24  |  1.95<H>    EGFR if : 36<L>  EGFR if non : 31<L>    Ca    8.5      02-12        Thyroid Function Tests:  02-12 @ 07:49 TSH 97.05 FreeT4 -- T3 35 Anti TPO -- Anti Thyroglobulin Ab -- TSI --  02-09 @ 18:36 TSH 96.17 FreeT4 -- T3 -- Anti TPO -- Anti Thyroglobulin Ab -- TSI --
Denies CP, SOB    Vital Signs Last 24 Hrs  T(C): 36.3 (02-15-18 @ 11:30), Max: 36.6 (02-14-18 @ 21:01)  T(F): 97.3 (02-15-18 @ 11:30), Max: 97.8 (02-14-18 @ 21:01)  HR: 65 (02-15-18 @ 12:53) (64 - 67)  BP: 115/70 (02-15-18 @ 12:53) (97/64 - 115/70)  BP(mean): --  RR: 16 (02-15-18 @ 11:30) (16 - 18)  SpO2: 97% (02-15-18 @ 11:30) (96% - 97%)    GENERAL: NAD  HEAD:  Atraumatic, Normocephalic  EYES: EOMI, PERRLA, conjunctiva and sclera clear  NECK: Supple  NERVOUS SYSTEM:  Alert & Oriented X3  CHEST/LUNG: Clear to percussion bilaterally  HEART: Regular rate and rhythm;  ABDOMEN: Soft, Nontender, Nondistended; Bowel sounds present  EXTREMITIES:  trace edema. Stasis dermatitis    allopurinol 100 milliGRAM(s) Oral daily  carvedilol 25 milliGRAM(s) Oral every 12 hours  digoxin     Tablet 0.125 milliGRAM(s) Oral every other day  doxycycline hyclate Capsule 100 milliGRAM(s) Oral every 12 hours  furosemide    Tablet 20 milliGRAM(s) Oral daily  insulin lispro (HumaLOG) corrective regimen sliding scale   SubCutaneous three times a day before meals  insulin lispro (HumaLOG) corrective regimen sliding scale   SubCutaneous at bedtime  levothyroxine 50 MICROGram(s) Oral daily  liothyronine 10 MICROGram(s) Oral daily  pantoprazole  Injectable 40 milliGRAM(s) IV Push daily  tamsulosin 0.4 milliGRAM(s) Oral at bedtime                        9.6    6.83  )-----------( 143      ( 15 Feb 2018 07:25 )             29.5     15 Feb 2018 07:25    142    |  103    |  20     ----------------------------<  81     3.7     |  28     |  1.80     Ca    8.5        15 Feb 2018 07:25    A/P:    CKD III stable at baseline  GIB, GI f/u noted  Hypothyroidism  Endo following  Continue Lasix  Avoid ACE/ARB  F/u BMP  D/w family at bedside  No objection to d/c from renal pov  Pt will f/u w/Dr. Graves (renal) as op
Denies CP, SOB    Vital Signs Last 24 Hrs  T(C): 36.7 (02-12-18 @ 11:16), Max: 36.8 (02-11-18 @ 21:21)  T(F): 98 (02-12-18 @ 11:16), Max: 98.2 (02-11-18 @ 21:21)  HR: 65 (02-12-18 @ 11:16) (65 - 78)  BP: 89/57 (02-12-18 @ 11:16) (89/57 - 109/71)  BP(mean): --  RR: 16 (02-12-18 @ 11:16) (16 - 18)  SpO2: 100% (02-12-18 @ 11:16) (94% - 100%)    GENERAL: NAD  HEAD:  Atraumatic, Normocephalic  EYES: EOMI, PERRLA, conjunctiva and sclera clear  NECK: Supple  NERVOUS SYSTEM:  Alert & Oriented X3  CHEST/LUNG: Clear to percussion bilaterally  HEART: Regular rate and rhythm;  ABDOMEN: Soft, Nontender, Nondistended; Bowel sounds present  EXTREMITIES:  trace edema. Stasis dermatitis    allopurinol 100 milliGRAM(s) Oral daily  carvedilol 25 milliGRAM(s) Oral every 12 hours  furosemide    Tablet 20 milliGRAM(s) Oral daily  insulin lispro (HumaLOG) corrective regimen sliding scale   SubCutaneous three times a day before meals  insulin lispro (HumaLOG) corrective regimen sliding scale   SubCutaneous at bedtime  levothyroxine 50 MICROGram(s) Oral daily  liothyronine 5 MICROGram(s) Oral daily  pantoprazole  Injectable 40 milliGRAM(s) IV Push daily  tamsulosin 0.4 milliGRAM(s) Oral at bedtime                       8.9    8.6   )-----------( 137      ( 12 Feb 2018 11:18 )             27.0     12 Feb 2018 11:18    144    |  104    |  28     ----------------------------<  99     4.0     |  24     |  1.95     Ca    8.5        12 Feb 2018 11:18    A/P:    CKD III stable  GIB, for EGD  Continue Lasix  Avoid ACE/ARB  F/u BMP  D/w family at bedside
INTERVAL HPI/OVERNIGHT EVENTS:    no new events    MEDICATIONS  (STANDING):  allopurinol 100 milliGRAM(s) Oral daily  carvedilol 25 milliGRAM(s) Oral every 12 hours  furosemide    Tablet 20 milliGRAM(s) Oral daily  insulin lispro (HumaLOG) corrective regimen sliding scale   SubCutaneous three times a day before meals  insulin lispro (HumaLOG) corrective regimen sliding scale   SubCutaneous at bedtime  levothyroxine 50 MICROGram(s) Oral daily  liothyronine 5 MICROGram(s) Oral daily  pantoprazole  Injectable 40 milliGRAM(s) IV Push daily  tamsulosin 0.4 milliGRAM(s) Oral at bedtime    MEDICATIONS  (PRN):      Allergies    Bactrim (Rash)  cefadroxil (Hives)  Levaquin (Rash)  Lipitor (Rash)    Intolerances        Review of Systems:    General:  No wt loss, fevers, chills, night sweats,fatigue,   Eyes:  Good vision, no reported pain  ENT:  No sore throat, pain, runny nose, dysphagia  CV:  No pain, palpitatioins, hypo/hypertension  Resp:  No dyspnea, cough, tachypnea, wheezing  GI:  No pain, No nausea, No vomiting, No diarrhea, No constipatiion, No weight loss, No fever, No pruritis, No rectal bleeding, No tarry stools, No dysphagia,  :  No pain, bleeding, incontinence, nocturia  Muscle:  No pain, weakness  Neuro:  No weakness, tingling, memory problems  Psych:  No fatigue, insomnia, mood problems, depression  Endocrine:  No polyuria, polydypsia, cold/heat intolerance  Heme:  No petechiae, ecchymosis, easy bruisability  Skin:  No rash, tattoos, scars, edema      Vital Signs Last 24 Hrs  T(C): 36.7 (2018 11:08), Max: 37 (10 Feb 2018 22:00)  T(F): 98 (2018 11:08), Max: 98.6 (10 Feb 2018 22:00)  HR: 70 (2018 11:08) (62 - 70)  BP: 90/57 (2018 11:08) (90/48 - 128/87)  BP(mean): --  RR: 18 (2018 11:08) (18 - 18)  SpO2: 96% (2018 11:08) (95% - 97%)    PHYSICAL EXAM:    Constitutional: NAD, well-developed  HEENT: EOMI, throat clear  Neck: No LAD, supple  Respiratory: CTA and P  Cardiovascular: S1 and S2, RRR, no M  Gastrointestinal: BS+, soft, NT/ND, neg HSM,  Extremities: No peripheral edema, neg clubing, cyanosis  Vascular: 2+ peripheral pulses  Neurological: A/O x 3, no focal deficits  Psychiatric: Normal mood, normal affect  Skin: No rashes      LABS:                        8.8    7.10  )-----------( 162      ( 2018 08:21 )             27.8     02-11    143  |  105  |  36<H>  ----------------------------<  91  3.8   |  24  |  1.87<H>    Ca    8.7      2018 09:19    TPro  5.6<L>  /  Alb  3.1<L>  /  TBili  0.6  /  DBili  x   /  AST  22  /  ALT  18  /  AlkPhos  58  02-09      Urinalysis Basic - ( 2018 18:58 )    Color: Yellow / Appearance: Clear / S.011 / pH: x  Gluc: x / Ketone: Negative  / Bili: Negative / Urobili: Negative   Blood: x / Protein: Negative / Nitrite: Negative   Leuk Esterase: Negative / RBC: x / WBC 0-2 /HPF   Sq Epi: x / Non Sq Epi: x / Bacteria: x        RADIOLOGY & ADDITIONAL TESTS:
INTERVAL HPI/OVERNIGHT EVENTS:  no further bleeding   more alert today    MEDICATIONS  (STANDING):  allopurinol 100 milliGRAM(s) Oral daily  carvedilol 25 milliGRAM(s) Oral every 12 hours  furosemide    Tablet 20 milliGRAM(s) Oral daily  insulin lispro (HumaLOG) corrective regimen sliding scale   SubCutaneous three times a day before meals  insulin lispro (HumaLOG) corrective regimen sliding scale   SubCutaneous at bedtime  levothyroxine 50 MICROGram(s) Oral daily  liothyronine 5 MICROGram(s) Oral daily  pantoprazole  Injectable 40 milliGRAM(s) IV Push daily  tamsulosin 0.4 milliGRAM(s) Oral at bedtime    MEDICATIONS  (PRN):      Allergies    Bactrim (Rash)  cefadroxil (Hives)  Levaquin (Rash)  Lipitor (Rash)    Intolerances        Review of Systems:    General:  No wt loss, fevers, chills, night sweats,fatigue,   Eyes:  Good vision, no reported pain  ENT:  No sore throat, pain, runny nose, dysphagia  CV:  No pain, palpitatioins, hypo/hypertension  Resp:  No dyspnea, cough, tachypnea, wheezing  GI:  No pain, No nausea, No vomiting, No diarrhea, No constipatiion, No weight loss, No fever, No pruritis, No rectal bleeding, No tarry stools, No dysphagia,  :  No pain, bleeding, incontinence, nocturia  Muscle:  No pain, weakness  Neuro:  No weakness, tingling, memory problems  Psych:  No fatigue, insomnia, mood problems, depression  Endocrine:  No polyuria, polydypsia, cold/heat intolerance  Heme:  No petechiae, ecchymosis, easy bruisability  Skin:  No rash, tattoos, scars, edema      Vital Signs Last 24 Hrs  T(C): 36.7 (2018 11:08), Max: 37 (10 Feb 2018 22:00)  T(F): 98 (2018 11:08), Max: 98.6 (10 Feb 2018 22:00)  HR: 70 (2018 11:08) (62 - 70)  BP: 90/57 (2018 11:08) (90/48 - 128/87)  BP(mean): --  RR: 18 (2018 11:08) (18 - 18)  SpO2: 96% (2018 11:08) (95% - 97%)    PHYSICAL EXAM:    Constitutional: NAD, well-developed  HEENT: EOMI, throat clear  Neck: No LAD, supple  Respiratory: CTA and P  Cardiovascular: S1 and S2, RRR, no M  Gastrointestinal: BS+, soft, NT/ND, neg HSM,  Extremities: No peripheral edema, neg clubing, cyanosis  Vascular: 2+ peripheral pulses  Neurological: A/O x 3, no focal deficits  Psychiatric: Normal mood, normal affect  Skin: No rashes      LABS:                        8.8    7.10  )-----------( 162      ( 2018 08:21 )             27.8     02-11    143  |  105  |  36<H>  ----------------------------<  91  3.8   |  24  |  1.87<H>    Ca    8.7      2018 09:19    TPro  5.6<L>  /  Alb  3.1<L>  /  TBili  0.6  /  DBili  x   /  AST  22  /  ALT  18  /  AlkPhos  58  02-09      Urinalysis Basic - ( 2018 18:58 )    Color: Yellow / Appearance: Clear / S.011 / pH: x  Gluc: x / Ketone: Negative  / Bili: Negative / Urobili: Negative   Blood: x / Protein: Negative / Nitrite: Negative   Leuk Esterase: Negative / RBC: x / WBC 0-2 /HPF   Sq Epi: x / Non Sq Epi: x / Bacteria: x        RADIOLOGY & ADDITIONAL TESTS:
Patient is a 83y old  Male who presents with a chief complaint of 82yo M with hx of Afib on AC, cardiomyopathy (normal BP 80s/50s) with AICD presents with low Hgb, elevated dig levels from PMD. Went to PMD, had levels checked, told to come to the ER. pt complaining of some bleeding from rectum intermittently over the last month. endorsing sob and dizziness with ambulation and also when he is getting up from lying down to standing. no fevers, chills, recent illnesses. lives at home with wife, son. previously able to ambulate with cane, but since 3 weeks ago, can barely walk with walker. (09 Feb 2018 19:58)      SUBJECTIVE / OVERNIGHT EVENTS:  He still has some orthopnea.  He does not have any edema in his legs.  He denies any dizziness at this time.  Review of Systems:   CONSTITUTIONAL: No fever, weight loss, or fatigue  EYES: No eye pain, visual disturbances, or discharge  ENMT:  No difficulty hearing, tinnitus, vertigo; No sinus or throat pain  NECK: No pain or stiffness  BREASTS: No pain, masses, or nipple discharge  RESPIRATORY: No cough, wheezing, chills or hemoptysis; Orthopnea +  CARDIOVASCULAR: No chest pain, palpitations, dizziness, or leg swelling  GASTROINTESTINAL: No abdominal or epigastric pain. No nausea, vomiting, or hematemesis; No diarrhea or constipation. No melena or hematochezia.  GENITOURINARY: No dysuria, frequency, hematuria, or incontinence  NEUROLOGICAL: No headaches, memory loss, loss of strength, numbness, or tremors  SKIN: No itching, burning, rashes, or lesions   LYMPH NODES: No enlarged glands  ENDOCRINE: No heat or cold intolerance; No hair loss  MUSCULOSKELETAL: No joint pain or swelling; No muscle, back, or extremity pain  PSYCHIATRIC: No depression, anxiety, mood swings, or difficulty sleeping  HEME/LYMPH: No easy bruising, or bleeding gums  ALLERY AND IMMUNOLOGIC: No hives or eczema    MEDICATIONS  (STANDING):  allopurinol 100 milliGRAM(s) Oral daily  carvedilol 25 milliGRAM(s) Oral every 12 hours  doxycycline hyclate Capsule 100 milliGRAM(s) Oral every 12 hours  furosemide    Tablet 20 milliGRAM(s) Oral daily  insulin lispro (HumaLOG) corrective regimen sliding scale   SubCutaneous three times a day before meals  insulin lispro (HumaLOG) corrective regimen sliding scale   SubCutaneous at bedtime  levothyroxine 50 MICROGram(s) Oral daily  liothyronine 10 MICROGram(s) Oral daily  pantoprazole  Injectable 40 milliGRAM(s) IV Push daily  tamsulosin 0.4 milliGRAM(s) Oral at bedtime    MEDICATIONS  (PRN):      PHYSICAL EXAM:  Vital Signs Last 24 Hrs  T(C): 36.2 (13 Feb 2018 11:31), Max: 37.2 (13 Feb 2018 04:07)  T(F): 97.2 (13 Feb 2018 11:31), Max: 99 (13 Feb 2018 04:07)  HR: 66 (13 Feb 2018 17:20) (65 - 72)  BP: 112/70 (13 Feb 2018 17:20) (112/70 - 137/72)  BP(mean): --  RR: 17 (13 Feb 2018 11:31) (17 - 18)  SpO2: 95% (13 Feb 2018 11:31) (95% - 99%)  I&O's Summary    12 Feb 2018 07:01  -  13 Feb 2018 07:00  --------------------------------------------------------  IN: 1110 mL / OUT: 510 mL / NET: 600 mL    13 Feb 2018 07:01  -  13 Feb 2018 20:55  --------------------------------------------------------  IN: 540 mL / OUT: 1225 mL / NET: -685 mL      GENERAL: NAD, well-developed  HEAD:  Atraumatic, Normocephalic  EYES: EOMI, PERRLA, conjunctiva and sclera clear  NECK: Supple, No JVD  CHEST/LUNG: Clear to auscultation bilaterally; No wheeze  HEART: Regular rate and rhythm; No murmurs, rubs, or gallops  ABDOMEN: Soft, Nontender, Nondistended; Bowel sounds present  EXTREMITIES:  2+ Peripheral Pulses, No clubbing, cyanosis, or edema  PSYCH: AAOx3  NEUROLOGY: non-focal  SKIN: No rashes or lesions    LABS:  CAPILLARY BLOOD GLUCOSE      POCT Blood Glucose.: 96 mg/dL (13 Feb 2018 16:50)  POCT Blood Glucose.: 109 mg/dL (13 Feb 2018 11:57)  POCT Blood Glucose.: 87 mg/dL (13 Feb 2018 07:49)  POCT Blood Glucose.: 88 mg/dL (12 Feb 2018 21:35)                          8.0    6.84  )-----------( 142      ( 13 Feb 2018 07:51 )             24.5     02-13    140  |  103  |  29<H>  ----------------------------<  89  4.1   |  28  |  2.26<H>    Ca    8.7      13 Feb 2018 07:43  Phos  2.6     02-13  Mg     2.1     02-13        CARDIAC MARKERS ( 11 Feb 2018 22:45 )  x     / 0.12 ng/mL / x     / x     / x              RADIOLOGY & ADDITIONAL TESTS:    Imaging Personally Reviewed:    Consultant(s) Notes Reviewed:      Care Discussed with Consultants/Other Providers:
Patient is a 83y old  Male who presents with a chief complaint of 82yo M with hx of Afib on AC, cardiomyopathy (normal BP 80s/50s) with AICD presents with low Hgb, elevated dig levels from PMD. Went to PMD, had levels checked, told to come to the ER. pt complaining of some bleeding from rectum intermittently over the last month. endorsing sob and dizziness with ambulation and also when he is getting up from lying down to standing. no fevers, chills, recent illnesses. lives at home with wife, son. previously able to ambulate with cane, but since 3 weeks ago, can barely walk with walker. (09 Feb 2018 19:58)      SUBJECTIVE / OVERNIGHT EVENTS:  No SOB at rest. Denies dizziness or nausea  Review of Systems:   CONSTITUTIONAL: No fever, weight loss, or fatigue  EYES: No eye pain, visual disturbances, or discharge  ENMT:  No difficulty hearing, tinnitus, vertigo; No sinus or throat pain  NECK: No pain or stiffness  BREASTS: No pain, masses, or nipple discharge  RESPIRATORY: No cough, wheezing, chills or hemoptysis; No shortness of breath  CARDIOVASCULAR: No chest pain, palpitations, dizziness, or leg swelling  GASTROINTESTINAL: No abdominal or epigastric pain. No nausea, vomiting, or hematemesis; No diarrhea or constipation. No melena or hematochezia.  GENITOURINARY: No dysuria, frequency, hematuria, or incontinence  NEUROLOGICAL: No headaches, memory loss, loss of strength, numbness, or tremors  SKIN: No itching, burning, rashes, or lesions   LYMPH NODES: No enlarged glands  ENDOCRINE: No heat or cold intolerance; No hair loss  MUSCULOSKELETAL: No joint pain or swelling; No muscle, back, or extremity pain  PSYCHIATRIC: No depression, anxiety, mood swings, or difficulty sleeping  HEME/LYMPH: No easy bruising, or bleeding gums  ALLERY AND IMMUNOLOGIC: No hives or eczema    MEDICATIONS  (STANDING):  allopurinol 100 milliGRAM(s) Oral daily  carvedilol 25 milliGRAM(s) Oral every 12 hours  furosemide    Tablet 20 milliGRAM(s) Oral daily  insulin lispro (HumaLOG) corrective regimen sliding scale   SubCutaneous three times a day before meals  insulin lispro (HumaLOG) corrective regimen sliding scale   SubCutaneous at bedtime  levothyroxine 50 MICROGram(s) Oral daily  liothyronine 5 MICROGram(s) Oral daily  pantoprazole  Injectable 40 milliGRAM(s) IV Push daily  tamsulosin 0.4 milliGRAM(s) Oral at bedtime    MEDICATIONS  (PRN):      PHYSICAL EXAM:  Vital Signs Last 24 Hrs  T(C): 36.7 (12 Feb 2018 03:55), Max: 36.8 (11 Feb 2018 21:21)  T(F): 98.1 (12 Feb 2018 03:55), Max: 98.2 (11 Feb 2018 21:21)  HR: 78 (12 Feb 2018 03:55) (67 - 78)  BP: 103/59 (12 Feb 2018 03:55) (103/59 - 109/71)  BP(mean): --  RR: 17 (12 Feb 2018 03:55) (17 - 18)  SpO2: 94% (12 Feb 2018 03:55) (94% - 98%)  I&O's Summary    11 Feb 2018 07:01  -  12 Feb 2018 07:00  --------------------------------------------------------  IN: 240 mL / OUT: 700 mL / NET: -460 mL    12 Feb 2018 07:01  -  12 Feb 2018 11:10  --------------------------------------------------------  IN: 300 mL / OUT: 200 mL / NET: 100 mL      GENERAL: NAD, well-developed  HEAD:  Atraumatic, Normocephalic  EYES: EOMI, PERRLA, conjunctiva and sclera clear  NECK: Supple, No JVD  CHEST/LUNG: Clear to auscultation bilaterally; No wheeze  HEART: Regular rate and rhythm; No murmurs, rubs, or gallops  ABDOMEN: Soft, Nontender, Nondistended; Bowel sounds present  EXTREMITIES:  2+ Peripheral Pulses, No clubbing, cyanosis, or edema  PSYCH: AAOx3  NEUROLOGY: non-focal  SKIN: No rashes or lesions    LABS:  CAPILLARY BLOOD GLUCOSE      POCT Blood Glucose.: 89 mg/dL (12 Feb 2018 07:38)  POCT Blood Glucose.: 118 mg/dL (11 Feb 2018 21:11)  POCT Blood Glucose.: 117 mg/dL (11 Feb 2018 17:27)  POCT Blood Glucose.: 132 mg/dL (11 Feb 2018 12:50)                          8.8    7.10  )-----------( 162      ( 11 Feb 2018 08:21 )             27.8     02-11    143  |  105  |  36<H>  ----------------------------<  91  3.8   |  24  |  1.87<H>    Ca    8.7      11 Feb 2018 09:19        CARDIAC MARKERS ( 11 Feb 2018 22:45 )  x     / 0.12 ng/mL / x     / x     / x      CARDIAC MARKERS ( 11 Feb 2018 15:54 )  x     / 0.13 ng/mL / x     / x     / 2.3 ng/mL          RADIOLOGY & ADDITIONAL TESTS:    Imaging Personally Reviewed:    Consultant(s) Notes Reviewed:      Care Discussed with Consultants/Other Providers:
Patient is a 83y old  Male who presents with a chief complaint of 84yo M with hx of Afib on AC, cardiomyopathy (normal BP 80s/50s) with AICD presents with low Hgb, elevated dig levels from PMD. Went to PMD, had levels checked, told to come to the ER. pt complaining of some bleeding from rectum intermittently over the last month. endorsing sob and dizziness with ambulation and also when he is getting up from lying down to standing. no fevers, chills, recent illnesses. lives at home with wife, son. previously able to ambulate with cane, but since 3 weeks ago, can barely walk with walker. (09 Feb 2018 19:58)      SUBJECTIVE / OVERNIGHT EVENTS:  Orthopnea persists. No dizziness.  Review of Systems:   CONSTITUTIONAL: No fever, weight loss, or fatigue  EYES: No eye pain, visual disturbances, or discharge  ENMT:  No difficulty hearing, tinnitus, vertigo; No sinus or throat pain  NECK: No pain or stiffness  BREASTS: No pain, masses, or nipple discharge  RESPIRATORY: No cough, wheezing, chills or hemoptysis; No shortness of breath  CARDIOVASCULAR: No chest pain, palpitations, dizziness, or leg swelling  GASTROINTESTINAL: No abdominal or epigastric pain. No nausea, vomiting, or hematemesis; No diarrhea or constipation. No melena or hematochezia.  GENITOURINARY: No dysuria, frequency, hematuria, or incontinence  NEUROLOGICAL: No headaches, memory loss, loss of strength, numbness, or tremors  SKIN: No itching, burning, rashes, or lesions   LYMPH NODES: No enlarged glands  ENDOCRINE: No heat or cold intolerance; No hair loss  MUSCULOSKELETAL: No joint pain or swelling; No muscle, back, or extremity pain  PSYCHIATRIC: No depression, anxiety, mood swings, or difficulty sleeping  HEME/LYMPH: No easy bruising, or bleeding gums  ALLERY AND IMMUNOLOGIC: No hives or eczema    MEDICATIONS  (STANDING):  allopurinol 100 milliGRAM(s) Oral daily  carvedilol 25 milliGRAM(s) Oral every 12 hours  doxycycline hyclate Capsule 100 milliGRAM(s) Oral every 12 hours  furosemide    Tablet 20 milliGRAM(s) Oral daily  insulin lispro (HumaLOG) corrective regimen sliding scale   SubCutaneous three times a day before meals  insulin lispro (HumaLOG) corrective regimen sliding scale   SubCutaneous at bedtime  levothyroxine 50 MICROGram(s) Oral daily  liothyronine 10 MICROGram(s) Oral daily  pantoprazole  Injectable 40 milliGRAM(s) IV Push daily  tamsulosin 0.4 milliGRAM(s) Oral at bedtime    MEDICATIONS  (PRN):      PHYSICAL EXAM:  Vital Signs Last 24 Hrs  T(C): 36.6 (14 Feb 2018 21:01), Max: 37 (14 Feb 2018 05:03)  T(F): 97.8 (14 Feb 2018 21:01), Max: 98.6 (14 Feb 2018 05:03)  HR: 64 (14 Feb 2018 21:01) (64 - 73)  BP: 97/64 (14 Feb 2018 21:01) (97/64 - 124/69)  BP(mean): --  RR: 18 (14 Feb 2018 21:01) (17 - 18)  SpO2: 96% (14 Feb 2018 21:01) (95% - 97%)  I&O's Summary    13 Feb 2018 07:01  -  14 Feb 2018 07:00  --------------------------------------------------------  IN: 540 mL / OUT: 2325 mL / NET: -1785 mL    14 Feb 2018 07:01  -  14 Feb 2018 22:45  --------------------------------------------------------  IN: 1060 mL / OUT: 1850 mL / NET: -790 mL      GENERAL: NAD, well-developed  HEAD:  Atraumatic, Normocephalic  EYES: EOMI, PERRLA, conjunctiva and sclera clear  NECK: Supple, No JVD  CHEST/LUNG: Clear to auscultation bilaterally; No wheeze  HEART: Regular rate and rhythm; No murmurs, rubs, or gallops  ABDOMEN: Soft, Nontender, Nondistended; Bowel sounds present  EXTREMITIES:  2+ Peripheral Pulses, No clubbing, cyanosis, or edema  PSYCH: AAOx3  NEUROLOGY: non-focal  SKIN: No rashes or lesions    LABS:  CAPILLARY BLOOD GLUCOSE      POCT Blood Glucose.: 101 mg/dL (14 Feb 2018 21:02)  POCT Blood Glucose.: 86 mg/dL (14 Feb 2018 16:32)  POCT Blood Glucose.: 117 mg/dL (14 Feb 2018 11:42)  POCT Blood Glucose.: 88 mg/dL (14 Feb 2018 07:59)                          8.7    6.82  )-----------( 138      ( 14 Feb 2018 07:35 )             26.7     02-14    141  |  103  |  25<H>  ----------------------------<  77  3.8   |  27  |  1.80<H>    Ca    8.5      14 Feb 2018 07:49  Phos  2.6     02-13  Mg     2.1     02-13                RADIOLOGY & ADDITIONAL TESTS:    Imaging Personally Reviewed:    Consultant(s) Notes Reviewed:      Care Discussed with Consultants/Other Providers:
Patient is a 83y old  Male who presents with a chief complaint of 84yo M with hx of Afib on AC, cardiomyopathy (normal BP 80s/50s) with AICD presents with low Hgb, elevated dig levels from PMD. Went to PMD, had levels checked, told to come to the ER. pt complaining of some bleeding from rectum intermittently over the last month. endorsing sob and dizziness with ambulation and also when he is getting up from lying down to standing. no fevers, chills, recent illnesses. lives at home with wife, son. previously able to ambulate with cane, but since 3 weeks ago, can barely walk with walker. (2018 19:58)      SUBJECTIVE / OVERNIGHT EVENTS:  Feels better since the transfusion  Review of Systems:   CONSTITUTIONAL: No fever, weight loss,  EYES: No eye pain, visual disturbances, or discharge  ENMT:  No difficulty hearing, tinnitus, vertigo; No sinus or throat pain  NECK: No pain or stiffness  BREASTS: No pain, masses, or nipple discharge  RESPIRATORY: No cough, wheezing, chills or hemoptysis; No shortness of breath  CARDIOVASCULAR: No chest pain, palpitations, dizziness, or leg swelling  GASTROINTESTINAL: No abdominal or epigastric pain. No nausea, vomiting, or hematemesis; No diarrhea or constipation. No melena or hematochezia.  GENITOURINARY: No dysuria, frequency, hematuria, or incontinence  NEUROLOGICAL: No headaches, memory loss, loss of strength, numbness, or tremors  SKIN: No itching, burning, rashes, or lesions   LYMPH NODES: No enlarged glands  ENDOCRINE: No heat or cold intolerance; No hair loss  MUSCULOSKELETAL: No joint pain or swelling; No muscle, back, or extremity pain  PSYCHIATRIC: No depression, anxiety, mood swings, or difficulty sleeping  HEME/LYMPH: No easy bruising, or bleeding gums  ALLERY AND IMMUNOLOGIC: No hives or eczema    MEDICATIONS  (STANDING):  allopurinol 100 milliGRAM(s) Oral daily  carvedilol 25 milliGRAM(s) Oral every 12 hours  furosemide    Tablet 20 milliGRAM(s) Oral daily  insulin lispro (HumaLOG) corrective regimen sliding scale   SubCutaneous three times a day before meals  insulin lispro (HumaLOG) corrective regimen sliding scale   SubCutaneous at bedtime  levothyroxine 50 MICROGram(s) Oral daily  liothyronine 5 MICROGram(s) Oral daily  pantoprazole  Injectable 40 milliGRAM(s) IV Push daily  tamsulosin 0.4 milliGRAM(s) Oral at bedtime    MEDICATIONS  (PRN):      PHYSICAL EXAM:  Vital Signs Last 24 Hrs  T(C): 36.7 (2018 11:08), Max: 37 (10 Feb 2018 22:00)  T(F): 98 (2018 11:08), Max: 98.6 (10 Feb 2018 22:00)  HR: 70 (:08) (62 - 70)  BP: 90/57 (:08) (90/48 - 128/87)  BP(mean): --  RR: 18 (2018 11:08) (18 - 18)  SpO2: 96% (2018 11:08) (95% - 97%)  I&O's Summary    10 Feb 2018 07:  -  2018 07:00  --------------------------------------------------------  IN: 1430 mL / OUT: 1500 mL / NET: -70 mL    2018 07:01  -  2018 16:22  --------------------------------------------------------  IN: 240 mL / OUT: 200 mL / NET: 40 mL      GENERAL: NAD, well-developed  HEAD:  Atraumatic, Normocephalic  EYES: EOMI, PERRLA, conjunctiva and sclera clear  NECK: Supple, No JVD  CHEST/LUNG: Clear to auscultation bilaterally; No wheeze  HEART: Regular rate and rhythm; No murmurs, rubs, or gallops  ABDOMEN: Soft, Nontender, Nondistended; Bowel sounds present  EXTREMITIES:  2+ Peripheral Pulses, No clubbing, cyanosis, or edema  PSYCH: AAOx3  NEUROLOGY: non-focal  SKIN: No rashes or lesions    LABS:  CAPILLARY BLOOD GLUCOSE      POCT Blood Glucose.: 132 mg/dL (2018 12:50)  POCT Blood Glucose.: 118 mg/dL (2018 08:47)  POCT Blood Glucose.: 88 mg/dL (10 Feb 2018 21:42)  POCT Blood Glucose.: 126 mg/dL (10 Feb 2018 17:43)                          8.8    7.10  )-----------( 162      ( 2018 08:21 )             27.8     02-11    143  |  105  |  36<H>  ----------------------------<  91  3.8   |  24  |  1.87<H>    Ca    8.7      2018 09:19            Urinalysis Basic - ( 2018 18:58 )    Color: Yellow / Appearance: Clear / S.011 / pH: x  Gluc: x / Ketone: Negative  / Bili: Negative / Urobili: Negative   Blood: x / Protein: Negative / Nitrite: Negative   Leuk Esterase: Negative / RBC: x / WBC 0-2 /HPF   Sq Epi: x / Non Sq Epi: x / Bacteria: x        RADIOLOGY & ADDITIONAL TESTS:    Imaging Personally Reviewed:    Consultant(s) Notes Reviewed:      Care Discussed with Consultants/Other Providers:
Subjective: no dyspnea      MEDICATIONS  (STANDING):  allopurinol 100 milliGRAM(s) Oral daily  carvedilol 25 milliGRAM(s) Oral every 12 hours  furosemide    Tablet 20 milliGRAM(s) Oral daily  insulin lispro (HumaLOG) corrective regimen sliding scale   SubCutaneous three times a day before meals  insulin lispro (HumaLOG) corrective regimen sliding scale   SubCutaneous at bedtime  levothyroxine 50 MICROGram(s) Oral daily  liothyronine 10 MICROGram(s) Oral daily  pantoprazole  Injectable 40 milliGRAM(s) IV Push daily  tamsulosin 0.4 milliGRAM(s) Oral at bedtime    MEDICATIONS  (PRN):          T(C): 37.2 (02-13-18 @ 04:07), Max: 37.2 (02-13-18 @ 04:07)  HR: 66 (02-13-18 @ 04:07) (65 - 72)  BP: 137/71 (02-13-18 @ 04:07) (89/57 - 137/72)  RR: 18 (02-13-18 @ 04:07) (16 - 18)  SpO2: 99% (02-13-18 @ 04:07) (96% - 100%)  Wt(kg): --        I&O's Detail    12 Feb 2018 07:01  -  13 Feb 2018 07:00  --------------------------------------------------------  IN:    Oral Fluid: 1110 mL  Total IN: 1110 mL    OUT:    Voided: 510 mL  Total OUT: 510 mL    Total NET: 600 mL               PHYSICAL EXAM:    GENERAL: NAD  HEAD:  Atraumatic, Normocephalic  EYES: EOMI, PERRLA, conjunctiva and sclera clear  ENMT: No tonsillar erythema, exudates, or enlargement; Moist mucous membranes, Good dentition, No lesions  NECK: Supple, No JVD, Normal thyroid  NERVOUS SYSTEM:  Alert & Oriented X3, Good concentration; Motor Strength 5/5 B/L upper and lower extremities; DTRs 2+ intact and symmetric  CHEST/LUNG: Clear to percussion bilaterally; No rales, rhonchi, wheezing, or rubs  HEART: Regular rate and rhythm; No murmurs, rubs, or gallops  ABDOMEN: Soft, Nontender, Nondistended; Bowel sounds present  EXTREMITIES:  trace edema. Stasis dermatosis  LYMPH: No lymphadenopathy noted  SKIN: No rashes or lesions        LABS:  CBC Full  -  ( 12 Feb 2018 11:18 )  WBC Count : 8.6 K/uL  Hemoglobin : 8.9 g/dL  Hematocrit : 27.0 %  Platelet Count - Automated : 137 K/uL  Mean Cell Volume : 95.2 fl  Mean Cell Hemoglobin : 31.3 pg  Mean Cell Hemoglobin Concentration : 32.9 gm/dL  Auto Neutrophil # : x  Auto Lymphocyte # : x  Auto Monocyte # : x  Auto Eosinophil # : x  Auto Basophil # : x  Auto Neutrophil % : x  Auto Lymphocyte % : x  Auto Monocyte % : x  Auto Eosinophil % : x  Auto Basophil % : x    02-12    144  |  104  |  28<H>  ----------------------------<  99  4.0   |  24  |  1.95<H>    Ca    8.5      12 Feb 2018 11:18          Impression:  * 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   * Mild pre-renal azotemia improved post transfused blood  * Acute anemia of GI blood loss  * Chronic hypotension  * Systolic CM  Recommendations:  * Follow Hgb trend.  Transfuse for Hgb <8  * Awaiting EGD/colonoscopy  * No need for extensive inpt renal investigation  * Cont maint diuretic as long as Cr remains stable
Subjective: transferred to chair. Improving appetite. Min SIMENTAL      MEDICATIONS  (STANDING):  allopurinol 100 milliGRAM(s) Oral daily  carvedilol 25 milliGRAM(s) Oral every 12 hours  furosemide    Tablet 20 milliGRAM(s) Oral daily  insulin lispro (HumaLOG) corrective regimen sliding scale   SubCutaneous three times a day before meals  insulin lispro (HumaLOG) corrective regimen sliding scale   SubCutaneous at bedtime  levothyroxine 50 MICROGram(s) Oral daily  liothyronine 5 MICROGram(s) Oral daily  pantoprazole  Injectable 40 milliGRAM(s) IV Push daily  tamsulosin 0.4 milliGRAM(s) Oral at bedtime    MEDICATIONS  (PRN):          T(C): 36.7 (18 @ 11:08), Max: 37 (02-10-18 @ 22:00)  HR: 70 (18 @ 11:08) (62 - 70)  BP: 90/57 (18 @ 11:08) (90/48 - 128/87)  RR: 18 (18 @ 11:08) (18 - 18)  SpO2: 96% (18 @ 11:08) (95% - 97%)  Wt(kg): --        I&O's Detail    10 Feb 2018 07:  -  2018 07:00  --------------------------------------------------------  IN:    Oral Fluid: 1080 mL    Packed Red Blood Cells: 350 mL  Total IN: 1430 mL    OUT:    Voided: 1500 mL  Total OUT: 1500 mL    Total NET: -70 mL      2018 07:01  -  2018 14:41  --------------------------------------------------------  IN:    Oral Fluid: 240 mL  Total IN: 240 mL    OUT:    Voided: 200 mL  Total OUT: 200 mL    Total NET: 40 mL               PHYSICAL EXAM:    GENERAL: NAD  HEAD:  Atraumatic, Normocephalic  EYES: EOMI, PERRLA, conjunctiva and sclera clear  ENMT: No tonsillar erythema, exudates, or enlargement; Moist mucous membranes, Good dentition, No lesions  NECK: Supple, No JVD, Normal thyroid  NERVOUS SYSTEM:  Alert & Oriented X3, Good concentration; Motor Strength 5/5 B/L upper and lower extremities; DTRs 2+ intact and symmetric  CHEST/LUNG: Clear to percussion bilaterally; No rales, rhonchi, wheezing, or rubs  HEART: Regular rate and rhythm; No murmurs, rubs, or gallops  ABDOMEN: Soft, Nontender, Nondistended; Bowel sounds present  EXTREMITIES:  trace edema. Stasis dermatosis  LYMPH: No lymphadenopathy noted  SKIN: No rashes or lesions        LABS:  CBC Full  -  ( 2018 08:21 )  WBC Count : 7.10 K/uL  Hemoglobin : 8.8 g/dL  Hematocrit : 27.8 %  Platelet Count - Automated : 162 K/uL  Mean Cell Volume : 92.7 fl  Mean Cell Hemoglobin : 29.3 pg  Mean Cell Hemoglobin Concentration : 31.7 gm/dL  Auto Neutrophil # : x  Auto Lymphocyte # : x  Auto Monocyte # : x  Auto Eosinophil # : x  Auto Basophil # : x  Auto Neutrophil % : x  Auto Lymphocyte % : x  Auto Monocyte % : x  Auto Eosinophil % : x  Auto Basophil % : x        143  |  105  |  36<H>  ----------------------------<  91  3.8   |  24  |  1.87<H>    Ca    8.7      2018 09:19    TPro  5.6<L>  /  Alb  3.1<L>  /  TBili  0.6  /  DBili  x   /  AST  22  /  ALT  18  /  AlkPhos  58  02-09      Urinalysis Basic - ( 2018 18:58 )    Color: Yellow / Appearance: Clear / S.011 / pH: x  Gluc: x / Ketone: Negative  / Bili: Negative / Urobili: Negative   Blood: x / Protein: Negative / Nitrite: Negative   Leuk Esterase: Negative / RBC: x / WBC 0-2 /HPF   Sq Epi: x / Non Sq Epi: x / Bacteria: x            Impression:  * CKD3-4. [place] urine.   * Mild pre-renal azotemia improved post transfused blood  * Acute anemia of GI blood loss  * Chronic hypotension  * Systolic CM  Recommendations:  * Follow Hgb trend.  Transfuse for Hgb <8  * GI eval of source of bleed  * No need for extensive inpt renal investigation  * Consider increasing lasix to 40mg daily
Patient is a 83y old  Male who presents with a chief complaint of 82yo M with hx of Afib on AC, cardiomyopathy (normal BP 80s/50s) with AICD presents with low Hgb, elevated dig levels from PMD. Went to PMD, had levels checked, told to come to the ER. pt complaining of some bleeding from rectum intermittently over the last month. endorsing sob and dizziness with ambulation and also when he is getting up from lying down to standing. no fevers, chills, recent illnesses. lives at home with wife, son. previously able to ambulate with cane, but since 3 weeks ago, can barely walk with walker. (2018 19:58)      SUBJECTIVE / OVERNIGHT EVENTS:  Feels weak and tired, gives a history of acute BRbPR a few days ago. Daughter gives a history of TSH of 4 last month. But developed acute decompensation in the last few weeks with weight loss, tiredness and dizziness.  Review of Systems:   CONSTITUTIONAL: No fever,   EYES: No eye pain, visual disturbances, or discharge  ENMT:  No difficulty hearing, tinnitus, vertigo; No sinus or throat pain  NECK: No pain or stiffness  BREASTS: No pain, masses, or nipple discharge  RESPIRATORY: No cough, wheezing, chills or hemoptysis; No shortness of breath  CARDIOVASCULAR: No chest pain, palpitations, dizziness, or leg swelling  GASTROINTESTINAL: No abdominal or epigastric pain. No nausea, vomiting, or hematemesis; No diarrhea or constipation. No melena or hematochezia.  GENITOURINARY: No dysuria, frequency, hematuria, or incontinence  NEUROLOGICAL: No headaches, memory loss, loss of strength, numbness, or tremors  SKIN: No itching, burning, rashes, or lesions   LYMPH NODES: No enlarged glands  ENDOCRINE: No heat or cold intolerance; No hair loss  MUSCULOSKELETAL: No joint pain or swelling; No muscle, back, or extremity pain  PSYCHIATRIC: No depression, anxiety, mood swings, or difficulty sleeping  HEME/LYMPH: No easy bruising, or bleeding gums  ALLERY AND IMMUNOLOGIC: No hives or eczema    MEDICATIONS  (STANDING):  allopurinol 100 milliGRAM(s) Oral daily  carvedilol 25 milliGRAM(s) Oral every 12 hours  furosemide    Tablet 20 milliGRAM(s) Oral daily  insulin lispro (HumaLOG) corrective regimen sliding scale   SubCutaneous three times a day before meals  insulin lispro (HumaLOG) corrective regimen sliding scale   SubCutaneous at bedtime  pantoprazole  Injectable 40 milliGRAM(s) IV Push daily  tamsulosin 0.4 milliGRAM(s) Oral at bedtime    MEDICATIONS  (PRN):      PHYSICAL EXAM:  Vital Signs Last 24 Hrs  T(C): 36.4 (10 Feb 2018 11:17), Max: 36.5 (10 Feb 2018 05:14)  T(F): 97.6 (10 Feb 2018 11:), Max: 97.7 (10 Feb 2018 05:14)  HR: 66 (10 Feb 2018 11:) (60 - 74)  BP: 121/67 (10 Feb 2018 11:17) (91/52 - 121/67)  BP(mean): --  RR: 18 (10 Feb 2018 11:17) (18 - 20)  SpO2: 98% (10 Feb 2018 11:17) (96% - 100%)  I&O's Summary    2018 07:01  -  10 Feb 2018 07:00  --------------------------------------------------------  IN: 240 mL / OUT: 300 mL / NET: -60 mL    10 Feb 2018 07:01  -  10 Feb 2018 16:50  --------------------------------------------------------  IN: 600 mL / OUT: 600 mL / NET: 0 mL      GENERAL: NAD, well-developed  HEAD:  Atraumatic, Normocephalic  EYES: EOMI, PERRLA, conjunctiva and sclera clear  NECK: Supple, No JVD  CHEST/LUNG: Clear to auscultation bilaterally; No wheeze  HEART: Regular rate and rhythm; No murmurs, rubs, or gallops  ABDOMEN: Soft, Nontender, Nondistended; Bowel sounds present  EXTREMITIES:  2+ Peripheral Pulses, No clubbing, cyanosis, or edema  PSYCH: AAOx3  NEUROLOGY: non-focal  SKIN: No rashes or lesions    LABS:  CAPILLARY BLOOD GLUCOSE                              7.3    8.2   )-----------( 131      ( 10 Feb 2018 12:19 )             21.4     02-    143  |  103  |  47<H>  ----------------------------<  88  4.2   |  25  |  2.42<H>    Ca    9.1      2018 15:01    TPro  5.6<L>  /  Alb  3.1<L>  /  TBili  0.6  /  DBili  x   /  AST  22  /  ALT  18  /  AlkPhos  58  02-09      CARDIAC MARKERS ( 2018 15:01 )  x     / 0.14 ng/mL / x     / x     / x          Urinalysis Basic - ( 2018 18:58 )    Color: Yellow / Appearance: Clear / S.011 / pH: x  Gluc: x / Ketone: Negative  / Bili: Negative / Urobili: Negative   Blood: x / Protein: Negative / Nitrite: Negative   Leuk Esterase: Negative / RBC: x / WBC 0-2 /HPF   Sq Epi: x / Non Sq Epi: x / Bacteria: x        RADIOLOGY & ADDITIONAL TESTS:    Imaging Personally Reviewed:    Consultant(s) Notes Reviewed:      Care Discussed with Consultants/Other Providers:

## 2018-02-15 NOTE — PROGRESS NOTE ADULT - PROVIDER SPECIALTY LIST ADULT
Cardiology
Endocrinology
Gastroenterology
Internal Medicine
Nephrology
Endocrinology
Internal Medicine

## 2018-02-15 NOTE — DISCHARGE NOTE ADULT - ADDITIONAL INSTRUCTIONS
Follow up with GI - Dr. Cervantes in 2 - 3 weeks - call for appointment  Follow up with Endocrinologist - Dr. Min in 1 week with TFT in 1 week Follow up with GI - Dr. Cervantes in 2 - 3 weeks - call for appointment  Follow up with Endocrinologist - Dr. Min in 1 week with TFT in 1 week  Repeat TFT's Wednesday. Follow up with GI - Dr. Cervantes in 2 - 3 weeks - call for appointment  Follow up with Endocrinologist - Dr. Min in 1 week with TFT in 1 week  Repeat TFT's Wednesday.  Follow with Phuc Menendez in our EP clinic

## 2018-02-15 NOTE — PHYSICAL THERAPY INITIAL EVALUATION ADULT - IMPAIRMENTS FOUND, PT EVAL
muscle strength/gait, locomotion, and balance/aerobic capacity/endurance/arousal, attention, and cognition

## 2018-02-15 NOTE — DISCHARGE NOTE ADULT - SECONDARY DIAGNOSIS.
Acquired hypothyroidism Chronic atrial fibrillation Benign essential hypertension CAD (coronary artery disease) CKD (chronic kidney disease), stage III Troponin level elevated Cardiomyopathy

## 2018-02-15 NOTE — DISCHARGE NOTE ADULT - MEDICATION SUMMARY - MEDICATIONS TO TAKE
I will START or STAY ON the medications listed below when I get home from the hospital:    Aspirin Enteric Coated 81 mg oral delayed release tablet  -- 1 tab(s) by mouth once a day  -- Indication: For CAD (coronary artery disease)    Flomax 0.4 mg oral capsule  -- 1 cap(s) by mouth once a day  -- Indication: For Prostate    digoxin 125 mcg (0.125 mg) oral tablet  -- 1 tab(s) by mouth every other day  -- Indication: For Atrial fibrillation    Eliquis 2.5 mg oral tablet  -- 1 tab(s) by mouth 2 times a day  -- Indication: For Atrial fibrillation    allopurinol 100 mg oral tablet  -- 1 tab(s) by mouth once a day  -- Indication: For Gout    doxycycline monohydrate 100 mg oral capsule  -- 1 cap(s) by mouth every 12 hours  -- Indication: For Prophylactic for AICD     Coreg 25 mg oral tablet  -- 1 tab(s) by mouth 2 times a day  -- Indication: For Heart failure    Lasix 20 mg oral tablet  -- 1 tab(s) by mouth once a day  -- Indication: For Heart failure    Protonix 40 mg oral delayed release tablet  -- 1 tab(s) by mouth once a day  -- Indication: For Prophylactic    levothyroxine 50 mcg (0.05 mg) oral tablet  -- 1 tab(s) by mouth once a day UNTIL 2/17    -- Indication: For Hypothyroidism (acquired)    Synthroid 75 mcg (0.075 mg) oral tablet  -- 1 tab(s) by mouth once a day  2/18 - 2/25  -- Indication: For Hypothyroidism (acquired)    Synthroid 88 mcg (0.088 mg) oral tablet  -- 1 tab(s) by mouth once a day  FROM 2/26   -- Indication: For Hypothyroidism (acquired)    liothyronine 5 mcg oral tablet  -- 2 tab(s) by mouth once a day  -- Indication: For Hypothyroidism (acquired)

## 2018-02-15 NOTE — DISCHARGE NOTE ADULT - MEDICATION SUMMARY - MEDICATIONS TO STOP TAKING
I will STOP taking the medications listed below when I get home from the hospital:    allopurinol  -- 1 tab(s) by mouth once a day    amiodarone 200 mg oral tablet  -- 1 tab(s) by mouth 2 times a day    loratadine 10 mg oral tablet  -- 1 tab(s) by mouth once a day, As Needed    Tylenol 500 mg oral tablet  -- 2 tab(s) by mouth 2 times a day    PreserVision AREDS 2 oral capsule  -- 1 cap(s) by mouth 2 times a day    Fish Oil 1200 mg oral capsule  -- 1 cap(s) by mouth once a day

## 2018-02-15 NOTE — PHYSICAL THERAPY INITIAL EVALUATION ADULT - PRECAUTIONS/LIMITATIONS, REHAB EVAL
fall precautions/cardiac precautions/Dx: Iron deficiency anemia sec to chronic rectal bleed- S/P 1 unit PRBC, Chronic atrial fibrillation - Off amio sec to acquired hypothyroidism, Hypothyroidism (acquired), Acute on Chronic CKD, Type II NSTEMI - likely demand ischemia

## 2018-02-15 NOTE — DISCHARGE NOTE ADULT - CARE PLAN
Principal Discharge DX:	Iron deficiency anemia due to chronic blood loss Principal Discharge DX:	Iron deficiency anemia due to chronic blood loss  Goal:	Stabilize  Assessment and plan of treatment:	Fololow up with PMD in 1 week   Repeat CBC in 1 week  Follow up with GI - Dr. Cervantes in 2 - 3 weeks - call for appointment  Secondary Diagnosis:	Acquired hypothyroidism  Assessment and plan of treatment:	Continue synthroid 50 mcg PO for 1 week (till 2/17)  2/18-2/25 synthroid 75 mcg daily.  2/26 on synthroid 88 mcg daily.  Continue cytomel 10 mcg daily throughout.  Repeat TFT's in 4 weeks, follow-up at that time.  Increase cytomel to 10 mcg daily.  Repeat TFT's Wednesday.  Secondary Diagnosis:	Chronic atrial fibrillation  Assessment and plan of treatment:	Atrial fibrillation is the most common heart rhythm problem.  The condition puts you at risk for has stroke and heart attack  It helps if you control your blood pressure, not drink more than 1-2 alcohol drinks per day, cut down on caffeine, getting treatment for over active thyroid gland, and get regular exercise  Call your doctor if you feel your heart racing or beating unusually, chest tightness or pain, lightheaded, faint, shortness of breath especially with exercise  It is important to take your heart medication as prescribed  Continue Eliquis  Secondary Diagnosis:	Benign essential hypertension  Assessment and plan of treatment:	Take medications for your blood pressure as recommended.  Eat a heart healthy diet that is low in saturated fats and salt, and includes whole grains, fruits, vegetables and lean protein   Exercise regularly (consult with your physician or cardiologist first); maintain a heart healthy weight.   If you smoke - quit (A resource to help you stop smoking is the [facility] – phone number 892-217-6857.). Continue to follow with your primary physician or cardiologist.   Seek medical help for dizziness, Lightheadedness, Blurry vision, Headache, Chest pain, Shortness of breath  Follow up with your medical doctor to establish long term blood pressure treatment goals.  Secondary Diagnosis:	CAD (coronary artery disease)  Assessment and plan of treatment:	Coronary artery disease is a condition where the arteries the supply the heart muscle get clogges with fatty deposits & puts you at risk for a heart attack  Call your doctor if you have any new pain, pressure, or discomfort in the center of your chest, pain, tingling or discomfort in arms, back, neck, jaw, or stomach, shortness of breath, nausea, vomiting, burping or heartburn, sweating, cold and clammy skin, racing or abnormal heartbeat for more than 10 minutes or if they keep coming & going.  Call 911 and do not tr to get to hospital by care  You can help yourself with lefestyle changes (quitting smoking if you smoke), eat lots of fruits & vegetables & low fat dairy products, not a lot of meat & fatty foods, walk or some form of physical activity most days of the week, lose weight if you are overweight  Take your cardiac medication as prescribed to lower cholesterol, to lower blood pressure, aspirin to prevent blood clots, and diabetes control  Make sure to keep appointments with doctor for cardiac follow up care  Secondary Diagnosis:	CKD (chronic kidney disease), stage III  Assessment and plan of treatment:	Avoid taking (NSAIDs) - (ex: Ibuprofen, Advil, Celebrex, Naprosyn)  Avoid taking any nephrotoxic agents (can harm kidneys) - Intravenous contrast for diagnostic testing, combination cold medications.  Have all medications adjusted for your renal function by your Health Care Provider.  Blood pressure control is important.  Take all medication as prescribed.  Secondary Diagnosis:	Troponin level elevated  Assessment and plan of treatment:	Likely demand Principal Discharge DX:	Iron deficiency anemia due to chronic blood loss  Goal:	Stabilize  Assessment and plan of treatment:	Follow up with PMD in 1 week   Repeat CBC in 1 week  Follow up with GI - Dr. Cervantes in 2 - 3 weeks - call for appointment  Secondary Diagnosis:	Acquired hypothyroidism  Assessment and plan of treatment:	Continue synthroid 50 mcg PO for 1 week (till 2/17)  2/18-2/25 synthroid 75 mcg daily.  2/26 on synthroid 88 mcg daily.  Continue cytomel 10 mcg daily throughout.  Repeat TFT's in 4 weeks, follow-up at that time.  Increase cytomel to 10 mcg daily.  Repeat TFT's Wednesday.  Secondary Diagnosis:	Chronic atrial fibrillation  Assessment and plan of treatment:	Atrial fibrillation is the most common heart rhythm problem.  The condition puts you at risk for has stroke and heart attack  It helps if you control your blood pressure, not drink more than 1-2 alcohol drinks per day, cut down on caffeine, getting treatment for over active thyroid gland, and get regular exercise  Call your doctor if you feel your heart racing or beating unusually, chest tightness or pain, lightheaded, faint, shortness of breath especially with exercise  It is important to take your heart medication as prescribed  Continue Eliquis  Secondary Diagnosis:	Benign essential hypertension  Assessment and plan of treatment:	Take medications for your blood pressure as recommended.  Eat a heart healthy diet that is low in saturated fats and salt, and includes whole grains, fruits, vegetables and lean protein   Exercise regularly (consult with your physician or cardiologist first); maintain a heart healthy weight.   If you smoke - quit (A resource to help you stop smoking is the [facility] – phone number 068-983-4496.). Continue to follow with your primary physician or cardiologist.   Seek medical help for dizziness, Lightheadedness, Blurry vision, Headache, Chest pain, Shortness of breath  Follow up with your medical doctor to establish long term blood pressure treatment goals.  Secondary Diagnosis:	CAD (coronary artery disease)  Assessment and plan of treatment:	Coronary artery disease is a condition where the arteries the supply the heart muscle get clogges with fatty deposits & puts you at risk for a heart attack  Call your doctor if you have any new pain, pressure, or discomfort in the center of your chest, pain, tingling or discomfort in arms, back, neck, jaw, or stomach, shortness of breath, nausea, vomiting, burping or heartburn, sweating, cold and clammy skin, racing or abnormal heartbeat for more than 10 minutes or if they keep coming & going.  Call 911 and do not tr to get to hospital by care  You can help yourself with lefestyle changes (quitting smoking if you smoke), eat lots of fruits & vegetables & low fat dairy products, not a lot of meat & fatty foods, walk or some form of physical activity most days of the week, lose weight if you are overweight  Take your cardiac medication as prescribed to lower cholesterol, to lower blood pressure, aspirin to prevent blood clots, and diabetes control  Make sure to keep appointments with doctor for cardiac follow up care  Secondary Diagnosis:	CKD (chronic kidney disease), stage III  Assessment and plan of treatment:	Avoid taking (NSAIDs) - (ex: Ibuprofen, Advil, Celebrex, Naprosyn)  Avoid taking any nephrotoxic agents (can harm kidneys) - Intravenous contrast for diagnostic testing, combination cold medications.  Have all medications adjusted for your renal function by your Health Care Provider.  Blood pressure control is important.  Take all medication as prescribed.  Secondary Diagnosis:	Troponin level elevated  Assessment and plan of treatment:	Likely demand Principal Discharge DX:	Iron deficiency anemia due to chronic blood loss  Goal:	Stabilize  Assessment and plan of treatment:	Follow up with PMD in 1 week   Repeat CBC in 1 week  Follow up with GI - Dr. Cervantes in 2 - 3 weeks - call for appointment  Secondary Diagnosis:	Acquired hypothyroidism  Assessment and plan of treatment:	Continue synthroid 50 mcg PO for 1 week (till 2/17)  2/18-2/25 synthroid 75 mcg daily.  2/26 on synthroid 88 mcg daily.  Continue cytomel 10 mcg daily throughout.  Repeat TFT's in 4 weeks, follow-up at that time.  Increase cytomel to 10 mcg daily.  Repeat TFT's Wednesday.  Secondary Diagnosis:	Chronic atrial fibrillation  Assessment and plan of treatment:	Atrial fibrillation is the most common heart rhythm problem.  The condition puts you at risk for has stroke and heart attack  It helps if you control your blood pressure, not drink more than 1-2 alcohol drinks per day, cut down on caffeine, getting treatment for over active thyroid gland, and get regular exercise  Call your doctor if you feel your heart racing or beating unusually, chest tightness or pain, lightheaded, faint, shortness of breath especially with exercise  It is important to take your heart medication as prescribed  Continue Eliquis  Secondary Diagnosis:	Benign essential hypertension  Assessment and plan of treatment:	Take medications for your blood pressure as recommended.  Eat a heart healthy diet that is low in saturated fats and salt, and includes whole grains, fruits, vegetables and lean protein   Exercise regularly (consult with your physician or cardiologist first); maintain a heart healthy weight.   If you smoke - quit (A resource to help you stop smoking is the [facility] – phone number 292-723-7819.). Continue to follow with your primary physician or cardiologist.   Seek medical help for dizziness, Lightheadedness, Blurry vision, Headache, Chest pain, Shortness of breath  Follow up with your medical doctor to establish long term blood pressure treatment goals.  Secondary Diagnosis:	CAD (coronary artery disease)  Assessment and plan of treatment:	Coronary artery disease is a condition where the arteries the supply the heart muscle get clogges with fatty deposits & puts you at risk for a heart attack  Call your doctor if you have any new pain, pressure, or discomfort in the center of your chest, pain, tingling or discomfort in arms, back, neck, jaw, or stomach, shortness of breath, nausea, vomiting, burping or heartburn, sweating, cold and clammy skin, racing or abnormal heartbeat for more than 10 minutes or if they keep coming & going.  Call 911 and do not tr to get to hospital by care  You can help yourself with lefestyle changes (quitting smoking if you smoke), eat lots of fruits & vegetables & low fat dairy products, not a lot of meat & fatty foods, walk or some form of physical activity most days of the week, lose weight if you are overweight  Take your cardiac medication as prescribed to lower cholesterol, to lower blood pressure, aspirin to prevent blood clots, and diabetes control  Make sure to keep appointments with doctor for cardiac follow up care  Secondary Diagnosis:	CKD (chronic kidney disease), stage III  Assessment and plan of treatment:	Avoid taking (NSAIDs) - (ex: Ibuprofen, Advil, Celebrex, Naprosyn)  Avoid taking any nephrotoxic agents (can harm kidneys) - Intravenous contrast for diagnostic testing, combination cold medications.  Have all medications adjusted for your renal function by your Health Care Provider.  Blood pressure control is important.  Take all medication as prescribed.  Secondary Diagnosis:	Cardiomyopathy  Assessment and plan of treatment:	Longstanding cardiomyopathy(dx 1995, TTE 02/12/2018 with mild LV dysfunction LVEF 50-55%, RV enlargement with decreased RV function, mild AS, mild MR ).   -Appears euvolemic at this time  -Cont Lasix 20 mg po QD  -Cont Coreg 25 mg po BID  -Resume digoxin 0.125 mg po every other day (Dig level therapeutic at 1.2) given renal dysfunction.

## 2018-02-15 NOTE — CHART NOTE - NSCHARTNOTEFT_GEN_A_CORE
Request from Dr. Arias to facilitate patient discharge. Medication reconciliation reviewed, revised, and resolved with Dr. Arias who had medically cleared patient for discharge with follow-up as advised. Please refer to discharge note for detailed hospital course. Patient is currently stable for discharge to home at this time.    Contact # [phone]

## 2018-02-15 NOTE — DISCHARGE NOTE ADULT - INSTRUCTIONS
Low salt low fat Call and make follow up appointment with Primary Care Provider, Gastroenterologist and Endocrinologist after discharge. Return to the nearest emergency room or call 911 for worsening dizziness, shortness of breath or bleeding from rectum.

## 2018-02-15 NOTE — DISCHARGE NOTE ADULT - PLAN OF CARE
Stabilize Noe up with PMD in 1 week   Repeat CBC in 1 week  Follow up with GI - Dr. Cervantes in 2 - 3 weeks - call for appointment Continue synthroid 50 mcg PO for 1 week (till 2/17)  2/18-2/25 synthroid 75 mcg daily.  2/26 on synthroid 88 mcg daily.  Continue cytomel 10 mcg daily throughout.  Repeat TFT's in 4 weeks, follow-up at that time.  Increase cytomel to 10 mcg daily.  Repeat TFT's Wednesday. Atrial fibrillation is the most common heart rhythm problem.  The condition puts you at risk for has stroke and heart attack  It helps if you control your blood pressure, not drink more than 1-2 alcohol drinks per day, cut down on caffeine, getting treatment for over active thyroid gland, and get regular exercise  Call your doctor if you feel your heart racing or beating unusually, chest tightness or pain, lightheaded, faint, shortness of breath especially with exercise  It is important to take your heart medication as prescribed  Continue Eliquis Take medications for your blood pressure as recommended.  Eat a heart healthy diet that is low in saturated fats and salt, and includes whole grains, fruits, vegetables and lean protein   Exercise regularly (consult with your physician or cardiologist first); maintain a heart healthy weight.   If you smoke - quit (A resource to help you stop smoking is the [facility] – phone number [phone].). Continue to follow with your primary physician or cardiologist.   Seek medical help for dizziness, Lightheadedness, Blurry vision, Headache, Chest pain, Shortness of breath  Follow up with your medical doctor to establish long term blood pressure treatment goals. Coronary artery disease is a condition where the arteries the supply the heart muscle get clogges with fatty deposits & puts you at risk for a heart attack  Call your doctor if you have any new pain, pressure, or discomfort in the center of your chest, pain, tingling or discomfort in arms, back, neck, jaw, or stomach, shortness of breath, nausea, vomiting, burping or heartburn, sweating, cold and clammy skin, racing or abnormal heartbeat for more than 10 minutes or if they keep coming & going.  Call 911 and do not tr to get to hospital by care  You can help yourself with lefestyle changes (quitting smoking if you smoke), eat lots of fruits & vegetables & low fat dairy products, not a lot of meat & fatty foods, walk or some form of physical activity most days of the week, lose weight if you are overweight  Take your cardiac medication as prescribed to lower cholesterol, to lower blood pressure, aspirin to prevent blood clots, and diabetes control  Make sure to keep appointments with doctor for cardiac follow up care Avoid taking (NSAIDs) - (ex: Ibuprofen, Advil, Celebrex, Naprosyn)  Avoid taking any nephrotoxic agents (can harm kidneys) - Intravenous contrast for diagnostic testing, combination cold medications.  Have all medications adjusted for your renal function by your Health Care Provider.  Blood pressure control is important.  Take all medication as prescribed. Likely demand Follow up with PMD in 1 week   Repeat CBC in 1 week  Follow up with GI - Dr. Cervantes in 2 - 3 weeks - call for appointment Longstanding cardiomyopathy(dx 1995, TTE 02/12/2018 with mild LV dysfunction LVEF 50-55%, RV enlargement with decreased RV function, mild AS, mild MR ).   -Appears euvolemic at this time  -Cont Lasix 20 mg po QD  -Cont Coreg 25 mg po BID  -Resume digoxin 0.125 mg po every other day (Dig level therapeutic at 1.2) given renal dysfunction.

## 2018-02-15 NOTE — DISCHARGE NOTE ADULT - PATIENT PORTAL LINK FT
You can access the [email or web address], offered by [facility], by registering with the following website: [email or web address]

## 2018-02-15 NOTE — PHYSICAL THERAPY INITIAL EVALUATION ADULT - PERTINENT HX OF CURRENT PROBLEM, REHAB EVAL
83M PMHx  Afib on AC, cardiomyopathy (normal BP 80s/50s) with AICD presents with low Hgb, elevated dig levels from PMD. Went to PMD, had levels checked, told to come to the ER. pt complaining of some bleeding from rectum intermittently over the last month. endorsing sob and dizziness with ambulation and also when he is getting up from lying down to standing. no fevers, chills, recent illnesses. lives at home with wife, son. previously able to ambulate with cane,

## 2018-02-15 NOTE — PHYSICAL THERAPY INITIAL EVALUATION ADULT - CRITERIA FOR SKILLED THERAPEUTIC INTERVENTIONS
anticipated equipment needs at discharge/anticipated discharge recommendation/predicted duration of therapy intervention/therapy frequency/risk reduction/prevention/rehab potential/functional limitations in following categories/impairments found

## 2018-02-15 NOTE — DISCHARGE NOTE ADULT - HOSPITAL COURSE
83y.o. 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 83y.o. 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   Seen by Endocrinologist - Continue synthroid 50 mcg PO for 1 week (till 2/17)  2/18-2/25 synthroid 75 mcg daily.  2/26 on synthroid 88 mcg daily.  Continue cytomel 10 mcg daily throughout.  Repeat TFT's in 4 weeks, follow-up at that time.  Increased cytomel to 10 mcg daily.  Repeat TFT's Wednesday.    Follow up with Endocrinologist - Dr. Min in 1 week with TFT in 4 week  Follow up with GI - Dr. Cervantes in 2 - 3 weeks - call for appointment 83y.o. 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   Seen by Endocrinologist - Continue synthroid 50 mcg PO for 1 week (till 2/17)  2/18-2/25 synthroid 75 mcg daily.  2/26 on synthroid 88 mcg daily.  Increased cytomel to 10 mcg daily  Repeat TFT's in 4 weeks, follow-up at that time.  Increased cytomel to 10 mcg daily.  Repeat TFT's Wednesday.    GI Consult for iron deficiency anemia - unlikely to have acute blood loss from GI bleed  Anemia likely related to hypothyroid, hold off on endoscopic eval as inpatient. Will need outpatient endoscopy      Follow up with Endocrinologist - Dr. Min in 1 week with TFT in 4 week  Follow up with GI - Dr. Cervantes in 2 - 3 weeks - call for appointment

## 2018-02-15 NOTE — DISCHARGE NOTE ADULT - CARE PROVIDER_API CALL
Marquis Min), EndocrinologyMetabDiabetes; Internal Medicine  [address]  [address]  [place] [address]  Phone: [phone]  Fax: [phone]    Sabino Cervantes (DO), Gastroenterology; Internal Medicine  [address] [place] [address]  Phone: [phone]  Fax: [phone]

## 2018-02-15 NOTE — DISCHARGE NOTE ADULT - HOME CARE AGENCY
[facility] care agency. . teaching. assessment and evaluation for  hha and home physical therapy.

## 2018-02-15 NOTE — PHYSICAL THERAPY INITIAL EVALUATION ADULT - ADDITIONAL COMMENTS
83 year old male who PTA was living in a PH 0 entry steps, living on first floor with wife, PTA pt was ambulating with a cane or a walker, able to ambulate household distances independently. Pt reports going to a gym to exercises not too long ago, however unable to do so now that he does not drive anymore. Pt reports independence in BADL's assistance for IADL's provided by family and spouse,

## 2018-02-16 LAB — INTERPRETATION 24H UR IFE-IMP: SIGNIFICANT CHANGE UP

## 2018-02-17 LAB — INTERPRETATION SERPL IFE-IMP: SIGNIFICANT CHANGE UP

## 2018-03-29 ENCOUNTER — APPOINTMENT (OUTPATIENT)
Dept: ELECTROPHYSIOLOGY | Facility: CLINIC | Age: 83
End: 2018-03-29
Payer: MEDICARE

## 2018-03-29 DIAGNOSIS — I50.22 CHRONIC SYSTOLIC (CONGESTIVE) HEART FAILURE: ICD-10-CM

## 2018-03-29 PROBLEM — I25.10 ATHEROSCLEROTIC HEART DISEASE OF NATIVE CORONARY ARTERY WITHOUT ANGINA PECTORIS: Chronic | Status: ACTIVE | Noted: 2018-02-09

## 2018-03-29 PROBLEM — I10 ESSENTIAL (PRIMARY) HYPERTENSION: Chronic | Status: ACTIVE | Noted: 2018-02-09

## 2018-03-29 PROBLEM — I48.91 UNSPECIFIED ATRIAL FIBRILLATION: Chronic | Status: ACTIVE | Noted: 2018-02-09

## 2018-03-29 PROBLEM — N40.0 BENIGN PROSTATIC HYPERPLASIA WITHOUT LOWER URINARY TRACT SYMPTOMS: Chronic | Status: ACTIVE | Noted: 2018-02-09

## 2018-03-29 PROCEDURE — 93295 DEV INTERROG REMOTE 1/2/MLT: CPT

## 2018-03-29 PROCEDURE — 93296 REM INTERROG EVL PM/IDS: CPT

## 2018-04-27 ENCOUNTER — APPOINTMENT (OUTPATIENT)
Dept: ELECTROPHYSIOLOGY | Facility: CLINIC | Age: 83
End: 2018-04-27
Payer: COMMERCIAL

## 2018-04-27 VITALS
HEART RATE: 66 BPM | OXYGEN SATURATION: 97 % | HEIGHT: 68 IN | BODY MASS INDEX: 23.49 KG/M2 | WEIGHT: 155 LBS | DIASTOLIC BLOOD PRESSURE: 68 MMHG | SYSTOLIC BLOOD PRESSURE: 117 MMHG

## 2018-04-27 PROCEDURE — 93284 PRGRMG EVAL IMPLANTABLE DFB: CPT

## 2018-04-27 RX ORDER — PANTOPRAZOLE SODIUM 40 MG/1
40 TABLET, DELAYED RELEASE ORAL
Refills: 0 | Status: ACTIVE | COMMUNITY

## 2018-04-27 RX ORDER — AMIODARONE HYDROCHLORIDE 200 MG/1
200 TABLET ORAL DAILY
Qty: 30 | Refills: 6 | Status: DISCONTINUED | COMMUNITY
End: 2018-04-27

## 2018-04-27 RX ORDER — VITAMIN B COMPLEX
TABLET ORAL
Refills: 0 | Status: DISCONTINUED | COMMUNITY
End: 2018-04-27

## 2018-04-27 RX ORDER — LEVOTHYROXINE SODIUM 75 UG/1
75 TABLET ORAL
Refills: 0 | Status: ACTIVE | COMMUNITY

## 2018-10-26 ENCOUNTER — APPOINTMENT (OUTPATIENT)
Dept: ELECTROPHYSIOLOGY | Facility: CLINIC | Age: 83
End: 2018-10-26
Payer: COMMERCIAL

## 2018-10-26 VITALS
WEIGHT: 149 LBS | BODY MASS INDEX: 22.58 KG/M2 | HEART RATE: 77 BPM | DIASTOLIC BLOOD PRESSURE: 69 MMHG | OXYGEN SATURATION: 99 % | SYSTOLIC BLOOD PRESSURE: 121 MMHG | HEIGHT: 68 IN

## 2018-10-26 PROCEDURE — 93284 PRGRMG EVAL IMPLANTABLE DFB: CPT

## 2018-10-30 ENCOUNTER — APPOINTMENT (OUTPATIENT)
Dept: ELECTROPHYSIOLOGY | Facility: CLINIC | Age: 83
End: 2018-10-30

## 2019-01-25 ENCOUNTER — APPOINTMENT (OUTPATIENT)
Dept: ELECTROPHYSIOLOGY | Facility: CLINIC | Age: 84
End: 2019-01-25

## 2019-02-14 NOTE — ED ADULT NURSE REASSESSMENT NOTE - NS ED NURSE REASSESS COMMENT FT1
Received report from Jeniffer NUNN. Pt resting in stretcher comfortably, IV inserted, labs drawn and sent. Telephone Encounter by Lucille Jorge at 11/16/18 07:46 AM     Author:  Lucille Jorge Service:  (none) Author Type:       Filed:  11/16/18 07:48 AM Encounter Date:  11/16/2018 Status:  Signed     :  Lucille Jorge ()              FELICIA WHITE    Patient Age: 89 year old    ACCT STATUS:   MESSAGE:   Hospitalist [CT1.1T] Beth[CT1.1M] left instructions for this patient to be scheduled with [CT1.1T] Hung[CT1.1M] in[CT1.1T] 3-5 days[CT1.1M] for a hospital follow-up.  Patient was discharged from[CT1.1T] [facility][CT1.1M] [facility] on[CT1.1T] 11/13/18[CT1.1M].  Please contact to schedule appointment     Next and Last Visit with Provider and Department  Next visit with TIAN BROCK is on 11/20/2018 at  1:15 PM in [facility]  Next visit with FAMILY PRACTICE is on 11/20/2018 at  1:15 PM in [facility]  Last visit with TIAN BROCK was on 10/12/2018 at 12:05 PM in [facility]  Last visit with FAMILY PRACTICE was on 10/12/2018 at 12:05 PM in [facility]     WEIGHT AND HEIGHT: As of 10/12/2018 weight is 148 lbs.(67.132 kg). Height is 5' 4\"(1.626 m).   BMI is 25.39 kg/(m^2) calculated from:     Height 5' 4\" (1.626 m) as of 10/12/18     Weight 148 lb (67.132 kg) as of 10/12/18      Allergies      Allergen   Reactions   • Medrol [Methylprednisolone]  Other - See Comments     dizziness, weakness, difficulty walking      Current outpatient prescriptions       Medication  Sig Dispense Refill   • diazepam (VALIUM) 5 MG tablet 1 tablet 30-60 minutes prior to MRI 1 Tab 0   • indomethacin (INDOCIN) 50 MG Cap 1 CAPSULE TWICE DAILY with food for 2 -3 days with gout flare up 20 Cap 0   • Lysine 500 MG TABS Take  by mouth.     • potassium chloride (KLOR-CON 10) 10 MEQ tablet Take 1 Tab by mouth 3 (three) times daily. 270 Tab 3   • lisinopril (PRINIVIL,ZESTRIL) 2.5 MG tablet Take 1 Tab by mouth daily. 90 Tab 3   • Carvedilol (COREG) 12.5 MG tablet  TAKE 1 TABLET TWICE A DAY WITH MEALS 180 Tab 3   • spironolactone (ALDACTONE) 25 MG tablet Take 12.5 mg by mouth daily.     • Calcium Carb-Cholecalciferol 600-200 MG-UNIT TABS Take  by mouth.     • Glucosamine-Chondroit-Vit C-Mn (GLUCOSAMINE 1500 COMPLEX OR) Take  by mouth.     • Multiple Vitamins-Minerals (EYE HEALTH) CAPS Take  by mouth.     • B Complex Vitamins (VITAMIN B COMPLEX OR) Take  by mouth.     • Cholecalciferol (VITAMIN D) 1000 UNIT PO TABS 1 TABLET DAILY--OTC 90 Tab 3      PHARMACY to use: Please ask patient if needed            Pharmacy preference(s) on file:    [facility] [address]  [facility]    CALL BACK INFO:[CT1.1T] DO NOT LEAVE A MESSAGE - contact patient directly[CT1.1M]  ROUTING:[CT1.1T] Patient's physician/staff[CT1.1M]        PCP: Skye Persaud MD         INS: Payor: MEDICARE PRIV FEE FOR SERVICE / Plan: N/A / Product Type: *No Product type* / Note: This is the primary coverage, but no account was found for this location or the patient's primary location.   ADDRESS:  [address] [place] [id number][CT1.1T]       Revision History        User Key Date/Time User Provider Type Action    > CT1.1 11/16/18 07:48 AM Lucille Jorge  Sign    M - Manual, T - Template

## 2019-04-19 ENCOUNTER — APPOINTMENT (OUTPATIENT)
Dept: ELECTROPHYSIOLOGY | Facility: CLINIC | Age: 84
End: 2019-04-19

## 2019-04-26 ENCOUNTER — APPOINTMENT (OUTPATIENT)
Dept: ELECTROPHYSIOLOGY | Facility: CLINIC | Age: 84
End: 2019-04-26
Payer: COMMERCIAL

## 2019-04-26 ENCOUNTER — APPOINTMENT (OUTPATIENT)
Dept: ELECTROPHYSIOLOGY | Facility: CLINIC | Age: 84
End: 2019-04-26

## 2019-04-26 VITALS
WEIGHT: 149 LBS | DIASTOLIC BLOOD PRESSURE: 61 MMHG | BODY MASS INDEX: 22.58 KG/M2 | SYSTOLIC BLOOD PRESSURE: 108 MMHG | HEART RATE: 75 BPM | HEIGHT: 68 IN | OXYGEN SATURATION: 98 %

## 2019-04-26 PROCEDURE — 93284 PRGRMG EVAL IMPLANTABLE DFB: CPT

## 2019-06-17 ENCOUNTER — TRANSCRIPTION ENCOUNTER (OUTPATIENT)
Age: 84
End: 2019-06-17

## 2019-06-17 ENCOUNTER — EMERGENCY (EMERGENCY)
Facility: HOSPITAL | Age: 84
LOS: 1 days | Discharge: HOME | End: 2019-06-17
Attending: EMERGENCY MEDICINE
Payer: MEDICARE

## 2019-06-17 VITALS
HEIGHT: 68 IN | DIASTOLIC BLOOD PRESSURE: 63 MMHG | RESPIRATION RATE: 18 BRPM | HEART RATE: 70 BPM | WEIGHT: 149.91 LBS | SYSTOLIC BLOOD PRESSURE: 104 MMHG | TEMPERATURE: 98 F | OXYGEN SATURATION: 97 %

## 2019-06-17 VITALS
DIASTOLIC BLOOD PRESSURE: 66 MMHG | TEMPERATURE: 98 F | HEART RATE: 60 BPM | SYSTOLIC BLOOD PRESSURE: 123 MMHG | OXYGEN SATURATION: 98 % | RESPIRATION RATE: 16 BRPM

## 2019-06-17 DIAGNOSIS — Z95.0 PRESENCE OF CARDIAC PACEMAKER: Chronic | ICD-10-CM

## 2019-06-17 DIAGNOSIS — Z95.810 PRESENCE OF AUTOMATIC (IMPLANTABLE) CARDIAC DEFIBRILLATOR: Chronic | ICD-10-CM

## 2019-06-17 LAB
ALBUMIN SERPL ELPH-MCNC: 3.2 G/DL — LOW (ref 3.3–5)
ALP SERPL-CCNC: 66 U/L — SIGNIFICANT CHANGE UP (ref 40–120)
ALT FLD-CCNC: 10 U/L — SIGNIFICANT CHANGE UP (ref 10–45)
ANION GAP SERPL CALC-SCNC: 13 MMOL/L — SIGNIFICANT CHANGE UP (ref 5–17)
APPEARANCE UR: CLEAR — SIGNIFICANT CHANGE UP
APTT BLD: 47.2 SEC — HIGH (ref 27.5–36.3)
AST SERPL-CCNC: 19 U/L — SIGNIFICANT CHANGE UP (ref 10–40)
BACTERIA # UR AUTO: 0 — SIGNIFICANT CHANGE UP
BASOPHILS # BLD AUTO: 0.1 K/UL — SIGNIFICANT CHANGE UP (ref 0–0.2)
BASOPHILS NFR BLD AUTO: 0.8 % — SIGNIFICANT CHANGE UP (ref 0–2)
BILIRUB SERPL-MCNC: 1.1 MG/DL — SIGNIFICANT CHANGE UP (ref 0.2–1.2)
BILIRUB UR-MCNC: NEGATIVE — SIGNIFICANT CHANGE UP
BUN SERPL-MCNC: 27 MG/DL — HIGH (ref 7–23)
CALCIUM SERPL-MCNC: 9.3 MG/DL — SIGNIFICANT CHANGE UP (ref 8.4–10.5)
CHLORIDE SERPL-SCNC: 100 MMOL/L — SIGNIFICANT CHANGE UP (ref 96–108)
CK SERPL-CCNC: 26 U/L — LOW (ref 30–200)
CO2 SERPL-SCNC: 27 MMOL/L — SIGNIFICANT CHANGE UP (ref 22–31)
COLOR SPEC: YELLOW — SIGNIFICANT CHANGE UP
CREAT SERPL-MCNC: 1.37 MG/DL — HIGH (ref 0.5–1.3)
DIFF PNL FLD: NEGATIVE — SIGNIFICANT CHANGE UP
DIGOXIN SERPL-MCNC: 1.1 NG/ML — SIGNIFICANT CHANGE UP (ref 0.8–2)
EOSINOPHIL # BLD AUTO: 0.2 K/UL — SIGNIFICANT CHANGE UP (ref 0–0.5)
EOSINOPHIL NFR BLD AUTO: 2.3 % — SIGNIFICANT CHANGE UP (ref 0–6)
EPI CELLS # UR: 0 /HPF — SIGNIFICANT CHANGE UP
GLUCOSE SERPL-MCNC: 107 MG/DL — HIGH (ref 70–99)
GLUCOSE UR QL: NEGATIVE — SIGNIFICANT CHANGE UP
HCT VFR BLD CALC: 35.6 % — LOW (ref 39–50)
HGB BLD-MCNC: 12.2 G/DL — LOW (ref 13–17)
HYALINE CASTS # UR AUTO: 0 /LPF — SIGNIFICANT CHANGE UP (ref 0–2)
INR BLD: 1.26 RATIO — HIGH (ref 0.88–1.16)
KETONES UR-MCNC: NEGATIVE — SIGNIFICANT CHANGE UP
LEUKOCYTE ESTERASE UR-ACNC: NEGATIVE — SIGNIFICANT CHANGE UP
LYMPHOCYTES # BLD AUTO: 2 K/UL — SIGNIFICANT CHANGE UP (ref 1–3.3)
LYMPHOCYTES # BLD AUTO: 23.5 % — SIGNIFICANT CHANGE UP (ref 13–44)
MCHC RBC-ENTMCNC: 34.2 GM/DL — SIGNIFICANT CHANGE UP (ref 32–36)
MCHC RBC-ENTMCNC: 35.2 PG — HIGH (ref 27–34)
MCV RBC AUTO: 103 FL — HIGH (ref 80–100)
MONOCYTES # BLD AUTO: 0.8 K/UL — SIGNIFICANT CHANGE UP (ref 0–0.9)
MONOCYTES NFR BLD AUTO: 9.5 % — SIGNIFICANT CHANGE UP (ref 2–14)
NEUTROPHILS # BLD AUTO: 5.3 K/UL — SIGNIFICANT CHANGE UP (ref 1.8–7.4)
NEUTROPHILS NFR BLD AUTO: 63.9 % — SIGNIFICANT CHANGE UP (ref 43–77)
NITRITE UR-MCNC: NEGATIVE — SIGNIFICANT CHANGE UP
PH UR: 6.5 — SIGNIFICANT CHANGE UP (ref 5–8)
PLATELET # BLD AUTO: 138 K/UL — LOW (ref 150–400)
POTASSIUM SERPL-MCNC: 3.9 MMOL/L — SIGNIFICANT CHANGE UP (ref 3.5–5.3)
POTASSIUM SERPL-SCNC: 3.9 MMOL/L — SIGNIFICANT CHANGE UP (ref 3.5–5.3)
PROT SERPL-MCNC: 5.6 G/DL — LOW (ref 6–8.3)
PROT UR-MCNC: NEGATIVE — SIGNIFICANT CHANGE UP
PROTHROM AB SERPL-ACNC: 14.5 SEC — HIGH (ref 10–12.9)
RBC # BLD: 3.46 M/UL — LOW (ref 4.2–5.8)
RBC # FLD: 13.7 % — SIGNIFICANT CHANGE UP (ref 10.3–14.5)
RBC CASTS # UR COMP ASSIST: 1 /HPF — SIGNIFICANT CHANGE UP (ref 0–4)
SODIUM SERPL-SCNC: 140 MMOL/L — SIGNIFICANT CHANGE UP (ref 135–145)
SP GR SPEC: 1.01 — SIGNIFICANT CHANGE UP (ref 1.01–1.02)
UROBILINOGEN FLD QL: NEGATIVE — SIGNIFICANT CHANGE UP
WBC # BLD: 8.3 K/UL — SIGNIFICANT CHANGE UP (ref 3.8–10.5)
WBC # FLD AUTO: 8.3 K/UL — SIGNIFICANT CHANGE UP (ref 3.8–10.5)
WBC UR QL: 0 /HPF — SIGNIFICANT CHANGE UP (ref 0–5)

## 2019-06-17 PROCEDURE — 72170 X-RAY EXAM OF PELVIS: CPT

## 2019-06-17 PROCEDURE — 72170 X-RAY EXAM OF PELVIS: CPT | Mod: 26

## 2019-06-17 PROCEDURE — 72131 CT LUMBAR SPINE W/O DYE: CPT | Mod: 26

## 2019-06-17 PROCEDURE — 93005 ELECTROCARDIOGRAM TRACING: CPT

## 2019-06-17 PROCEDURE — 73564 X-RAY EXAM KNEE 4 OR MORE: CPT | Mod: 26,LT

## 2019-06-17 PROCEDURE — 70450 CT HEAD/BRAIN W/O DYE: CPT | Mod: 26

## 2019-06-17 PROCEDURE — 85610 PROTHROMBIN TIME: CPT

## 2019-06-17 PROCEDURE — 99284 EMERGENCY DEPT VISIT MOD MDM: CPT | Mod: 25

## 2019-06-17 PROCEDURE — 85027 COMPLETE CBC AUTOMATED: CPT

## 2019-06-17 PROCEDURE — 73564 X-RAY EXAM KNEE 4 OR MORE: CPT

## 2019-06-17 PROCEDURE — 81001 URINALYSIS AUTO W/SCOPE: CPT

## 2019-06-17 PROCEDURE — 72131 CT LUMBAR SPINE W/O DYE: CPT

## 2019-06-17 PROCEDURE — 85730 THROMBOPLASTIN TIME PARTIAL: CPT

## 2019-06-17 PROCEDURE — 70450 CT HEAD/BRAIN W/O DYE: CPT

## 2019-06-17 PROCEDURE — 82962 GLUCOSE BLOOD TEST: CPT

## 2019-06-17 PROCEDURE — 93010 ELECTROCARDIOGRAM REPORT: CPT

## 2019-06-17 PROCEDURE — 80162 ASSAY OF DIGOXIN TOTAL: CPT

## 2019-06-17 PROCEDURE — 99284 EMERGENCY DEPT VISIT MOD MDM: CPT | Mod: GC,25

## 2019-06-17 PROCEDURE — 82550 ASSAY OF CK (CPK): CPT

## 2019-06-17 PROCEDURE — 80053 COMPREHEN METABOLIC PANEL: CPT

## 2019-06-17 RX ORDER — ACETAMINOPHEN 500 MG
975 TABLET ORAL ONCE
Refills: 0 | Status: COMPLETED | OUTPATIENT
Start: 2019-06-17 | End: 2019-06-17

## 2019-06-17 RX ADMIN — Medication 975 MILLIGRAM(S): at 17:56

## 2019-06-17 NOTE — ED PROVIDER NOTE - NSFOLLOWUPINSTRUCTIONS_ED_ALL_ED_FT
Follow up with your PCP in 24-48 hours.   May take Tylenol as directed on the bottle for pain control.   Return to the ER if you develop any new or worsening symptoms such as fever, leg weakness, difficulty walking, chest pain, SOB, numbness, weakness, abdominal pain, nausea, vomiting, or visual changes.

## 2019-06-17 NOTE — ED ADULT NURSE NOTE - DOES PATIENT HAVE ADVANCE DIRECTIVE
call MD if any redness, swelling or drainage from incision site, You were given 1000mg IV Tylenol for pain management.  Please DO NOT take any Tylenol containing products, such as  Vicodin, Percocet, Excedrin, many cold preparations for the next 6 hours (until 9:30 pm tonight__).  DO NOT EXCEED 3000MG OF TYLENOL OVER 24 HOURS.  When taking pain meds - take with food and know it may cause constipation and nausea - Do NOT drive while on narcotics. No

## 2019-06-17 NOTE — ED PROVIDER NOTE - PHYSICAL EXAMINATION
Gen: AAOx3, non-toxic  Head: NCAT  HEENT: EOMI, oral mucosa moist, normal conjunctiva  Lung: CTAB, no respiratory distress, no wheezes/rhonchi/rales B/L, speaking in full sentences  CV: RRR, no murmurs, rubs or gallops  Abd: soft, NTND, no guarding, no CVA tenderness  MSK: no midline spinal tenderness. no visible deformities  Neuro: +3/5 strength on lifting b/l legs off of bed. LE DTRs equal and reactive. No focal sensory or motor deficits, normal CN exam, no saddle anesthesia.   Skin: bruising on pt' b/l U and L extremities that the pt states is chronic   Psych: normal affect.     ~Apolinar Sousa PGY1

## 2019-06-17 NOTE — ED PROVIDER NOTE - CLINICAL SUMMARY MEDICAL DECISION MAKING FREE TEXT BOX
B/l LE pain and weakness with secondary difficulty ambulatory in setting of recent fall. Pt well appearing, A&Ox3. Weakness seems more likely 2/2 pain on exam. Will assess for subdural and spinal hematoma with CT head and lumbar. No neurologic signs and DTRs reactive and equal--no concern for GBS.

## 2019-06-17 NOTE — ED PROVIDER NOTE - PROGRESS NOTE DETAILS
Labs and imaging unremarkable. Pt feeling much better, denies any pain or weakness after tylenol. Ambulatory and baseline functional status per son at bedside.

## 2019-06-17 NOTE — ED PROVIDER NOTE - ATTENDING CONTRIBUTION TO CARE
attending Frida: 84yM h/o Afib on Eliquis, cardiomyopathy (low baseline BP) with AICD p/w difficulty ambulating x 1 day. Reports mechanical fall yesterday landing on his back. Denies head trauma or LOC. Now with difficulty walking due tro pain and weakness in both legs. Denies preceding viral or diarrheal illness.  On exam, well appearing, NAD, no saddle anesthesia, 2+ lower extremities reflexes bilaterally, full motor strength rhtoughout. Will obtain CTH, CT spine, pain control. Pt unable to get MRI due to AICD

## 2019-06-17 NOTE — ED ADULT NURSE REASSESSMENT NOTE - NS ED NURSE REASSESS COMMENT FT1
20:22. Pt AAOx4, NAD, resting comfortably in bed with son & spouse at bedside. Pt denies headache, dizziness, chest pain, cough, SOB, abdominal pain, n/v/d, urinary symptoms, fevers, chills at this time. Pt verbalized understanding to follow-up with PMD. Pt discharged as per MD, IV removed as per MD, pt taken out of ED via wheelchair.

## 2019-06-17 NOTE — ED PROVIDER NOTE - OBJECTIVE STATEMENT
84M     p/w difficulty walking x 1 day. Suffered a mechanical fall yesterday onto his back. No head trauma or LOC. Is on Eliquis. Since the fall he has experienced worsening difficulty ambulating due to b/l arm pain and weakness. Associated with difficulty urinating today as well. 84M with PMH of Afib on Eliquis and cardiomyopathy (low baseline BP) with AICD p/w difficulty ambulating x 1 day. Suffered a mechanical fall yesterday onto his back. No head trauma or LOC. Is on Eliquis. Since the fall he has experienced worsening difficulty ambulating due to b/l leg pain (waist down) and weakness. Associated with difficulty urinating today as well. Denies any other symptoms including fever, HA, visual changes, chest pain, SOB, N/V/D.

## 2019-06-17 NOTE — ED ADULT NURSE NOTE - OBJECTIVE STATEMENT
Male 84 years old with history of Afib on Eliquis was brought in by EMS for weakness. Pt reports he was getting in the car last night, lost his balance and fell backward. No LOC, vision change , N/V, dizziness, chest pain or sob. Didn't hit head on th ground. Today pt has pain and weakness of both thighs radiating to his leg, was unable to walk, went to Urgent Care center and was sent here for further evaluation. With dark discoloration of skin. Plan of care explained to pt. will continue to reassess. Safety maintained.

## 2019-06-17 NOTE — ED ADULT NURSE NOTE - NSIMPLEMENTINTERV_GEN_ALL_ED
Implemented All Fall with Harm Risk Interventions:  [place] to call system. Call bell, personal items and telephone within reach. Instruct patient to call for assistance. Room bathroom lighting operational. Non-slip footwear when patient is off stretcher. Physically safe environment: no spills, clutter or unnecessary equipment. Stretcher in lowest position, wheels locked, appropriate side rails in place. Provide visual cue, wrist band, yellow gown, etc. Monitor gait and stability. Monitor for mental status changes and reorient to person, place, and time. Review medications for side effects contributing to fall risk. Reinforce activity limits and safety measures with patient and family. Provide visual clues: red socks.

## 2019-06-17 NOTE — ED ADULT NURSE NOTE - CHPI ED NUR SYMPTOMS NEG
no confusion/no numbness/no vomiting/no change in level of consciousness/no loss of consciousness/no nausea/no dizziness/no fever/no blurred vision

## 2019-06-17 NOTE — ED PROVIDER NOTE - NS ED ROS FT
GENERAL: No fever or chills  EYES: no change in vision  HEENT: no trouble swallowing or speaking  CARDIAC: no chest pain or palpitations   PULMONARY: no cough or SOB  GI: no abdominal pain, nausea, vomiting, diarrhea, or constipation   : No changes in urination  SKIN: no rashes  NEURO: +b/l lower extremity weakness and difficulty walking. no headache or numbness   MSK: +b/l lower extremity pain    ~Apolinar Sousa PGY1

## 2019-07-17 ENCOUNTER — INPATIENT (INPATIENT)
Facility: HOSPITAL | Age: 84
LOS: 4 days | Discharge: HOME | DRG: 558 | End: 2019-07-22
Attending: INTERNAL MEDICINE | Admitting: INTERNAL MEDICINE
Payer: COMMERCIAL

## 2019-07-17 VITALS
WEIGHT: 145.06 LBS | HEART RATE: 67 BPM | OXYGEN SATURATION: 100 % | SYSTOLIC BLOOD PRESSURE: 97 MMHG | HEIGHT: 68 IN | TEMPERATURE: 98 F | RESPIRATION RATE: 16 BRPM | DIASTOLIC BLOOD PRESSURE: 60 MMHG

## 2019-07-17 DIAGNOSIS — Z95.0 PRESENCE OF CARDIAC PACEMAKER: Chronic | ICD-10-CM

## 2019-07-17 DIAGNOSIS — R53.1 WEAKNESS: ICD-10-CM

## 2019-07-17 DIAGNOSIS — Z95.810 PRESENCE OF AUTOMATIC (IMPLANTABLE) CARDIAC DEFIBRILLATOR: Chronic | ICD-10-CM

## 2019-07-17 LAB
ALBUMIN SERPL ELPH-MCNC: 3.1 G/DL — LOW (ref 3.3–5)
ALP SERPL-CCNC: 65 U/L — SIGNIFICANT CHANGE UP (ref 40–120)
ALT FLD-CCNC: 12 U/L — SIGNIFICANT CHANGE UP (ref 10–45)
ANION GAP SERPL CALC-SCNC: 14 MMOL/L — SIGNIFICANT CHANGE UP (ref 5–17)
ANION GAP SERPL CALC-SCNC: 15 MMOL/L — SIGNIFICANT CHANGE UP (ref 5–17)
APPEARANCE UR: CLEAR — SIGNIFICANT CHANGE UP
APTT BLD: 45.7 SEC — HIGH (ref 27.5–36.3)
AST SERPL-CCNC: 22 U/L — SIGNIFICANT CHANGE UP (ref 10–40)
BACTERIA # UR AUTO: NEGATIVE — SIGNIFICANT CHANGE UP
BASE EXCESS BLDV CALC-SCNC: 6.1 MMOL/L — HIGH (ref -2–2)
BASOPHILS # BLD AUTO: 0.1 K/UL — SIGNIFICANT CHANGE UP (ref 0–0.2)
BASOPHILS NFR BLD AUTO: 0.9 % — SIGNIFICANT CHANGE UP (ref 0–2)
BILIRUB SERPL-MCNC: 1 MG/DL — SIGNIFICANT CHANGE UP (ref 0.2–1.2)
BILIRUB UR-MCNC: NEGATIVE — SIGNIFICANT CHANGE UP
BLD GP AB SCN SERPL QL: NEGATIVE — SIGNIFICANT CHANGE UP
BUN SERPL-MCNC: 21 MG/DL — SIGNIFICANT CHANGE UP (ref 7–23)
BUN SERPL-MCNC: 21 MG/DL — SIGNIFICANT CHANGE UP (ref 7–23)
CA-I SERPL-SCNC: 1.16 MMOL/L — SIGNIFICANT CHANGE UP (ref 1.12–1.3)
CALCIUM SERPL-MCNC: 8.3 MG/DL — LOW (ref 8.4–10.5)
CALCIUM SERPL-MCNC: 8.9 MG/DL — SIGNIFICANT CHANGE UP (ref 8.4–10.5)
CHLORIDE BLDV-SCNC: 101 MMOL/L — SIGNIFICANT CHANGE UP (ref 96–108)
CHLORIDE SERPL-SCNC: 96 MMOL/L — SIGNIFICANT CHANGE UP (ref 96–108)
CHLORIDE SERPL-SCNC: 99 MMOL/L — SIGNIFICANT CHANGE UP (ref 96–108)
CO2 BLDV-SCNC: 33 MMOL/L — HIGH (ref 22–30)
CO2 SERPL-SCNC: 27 MMOL/L — SIGNIFICANT CHANGE UP (ref 22–31)
CO2 SERPL-SCNC: 28 MMOL/L — SIGNIFICANT CHANGE UP (ref 22–31)
COLOR SPEC: YELLOW — SIGNIFICANT CHANGE UP
CREAT SERPL-MCNC: 1.43 MG/DL — HIGH (ref 0.5–1.3)
CREAT SERPL-MCNC: 1.5 MG/DL — HIGH (ref 0.5–1.3)
DIFF PNL FLD: NEGATIVE — SIGNIFICANT CHANGE UP
DIGOXIN SERPL-MCNC: 1.1 NG/ML — SIGNIFICANT CHANGE UP (ref 0.8–2)
DIGOXIN SERPL-MCNC: 1.9 NG/ML — SIGNIFICANT CHANGE UP (ref 0.8–2)
EOSINOPHIL # BLD AUTO: 0.3 K/UL — SIGNIFICANT CHANGE UP (ref 0–0.5)
EOSINOPHIL NFR BLD AUTO: 4.3 % — SIGNIFICANT CHANGE UP (ref 0–6)
EPI CELLS # UR: 1 /HPF — SIGNIFICANT CHANGE UP
GAS PNL BLDV: 136 MMOL/L — SIGNIFICANT CHANGE UP (ref 135–145)
GAS PNL BLDV: SIGNIFICANT CHANGE UP
GLUCOSE BLDV-MCNC: 104 MG/DL — HIGH (ref 70–99)
GLUCOSE SERPL-MCNC: 108 MG/DL — HIGH (ref 70–99)
GLUCOSE SERPL-MCNC: 121 MG/DL — HIGH (ref 70–99)
GLUCOSE UR QL: NEGATIVE — SIGNIFICANT CHANGE UP
HCO3 BLDV-SCNC: 31 MMOL/L — HIGH (ref 21–29)
HCT VFR BLD CALC: 31.4 % — LOW (ref 39–50)
HCT VFR BLD CALC: 32.8 % — LOW (ref 39–50)
HCT VFR BLDA CALC: 33 % — LOW (ref 39–50)
HGB BLD CALC-MCNC: 10.7 G/DL — LOW (ref 13–17)
HGB BLD-MCNC: 11 G/DL — LOW (ref 13–17)
HGB BLD-MCNC: 11.1 G/DL — LOW (ref 13–17)
HYALINE CASTS # UR AUTO: 3 /LPF — HIGH (ref 0–2)
INR BLD: 1.39 RATIO — HIGH (ref 0.88–1.16)
KETONES UR-MCNC: NEGATIVE — SIGNIFICANT CHANGE UP
LACTATE BLDV-MCNC: 3.2 MMOL/L — HIGH (ref 0.7–2)
LEUKOCYTE ESTERASE UR-ACNC: NEGATIVE — SIGNIFICANT CHANGE UP
LYMPHOCYTES # BLD AUTO: 2.4 K/UL — SIGNIFICANT CHANGE UP (ref 1–3.3)
LYMPHOCYTES # BLD AUTO: 32.3 % — SIGNIFICANT CHANGE UP (ref 13–44)
MCHC RBC-ENTMCNC: 33.4 GM/DL — SIGNIFICANT CHANGE UP (ref 32–36)
MCHC RBC-ENTMCNC: 33.5 PG — SIGNIFICANT CHANGE UP (ref 27–34)
MCHC RBC-ENTMCNC: 35.5 GM/DL — SIGNIFICANT CHANGE UP (ref 32–36)
MCHC RBC-ENTMCNC: 35.5 PG — HIGH (ref 27–34)
MCV RBC AUTO: 100 FL — SIGNIFICANT CHANGE UP (ref 80–100)
MCV RBC AUTO: 100 FL — SIGNIFICANT CHANGE UP (ref 80–100)
MONOCYTES # BLD AUTO: 0.5 K/UL — SIGNIFICANT CHANGE UP (ref 0–0.9)
MONOCYTES NFR BLD AUTO: 6.1 % — SIGNIFICANT CHANGE UP (ref 2–14)
NEUTROPHILS # BLD AUTO: 4.3 K/UL — SIGNIFICANT CHANGE UP (ref 1.8–7.4)
NEUTROPHILS NFR BLD AUTO: 56.4 % — SIGNIFICANT CHANGE UP (ref 43–77)
NITRITE UR-MCNC: NEGATIVE — SIGNIFICANT CHANGE UP
PCO2 BLDV: 50 MMHG — SIGNIFICANT CHANGE UP (ref 35–50)
PH BLDV: 7.41 — SIGNIFICANT CHANGE UP (ref 7.35–7.45)
PH UR: 7 — SIGNIFICANT CHANGE UP (ref 5–8)
PLATELET # BLD AUTO: 136 K/UL — LOW (ref 150–400)
PLATELET # BLD AUTO: 137 K/UL — LOW (ref 150–400)
PO2 BLDV: 20 MMHG — LOW (ref 25–45)
POTASSIUM BLDV-SCNC: 3.6 MMOL/L — SIGNIFICANT CHANGE UP (ref 3.5–5.3)
POTASSIUM SERPL-MCNC: 3.3 MMOL/L — LOW (ref 3.5–5.3)
POTASSIUM SERPL-MCNC: 3.8 MMOL/L — SIGNIFICANT CHANGE UP (ref 3.5–5.3)
POTASSIUM SERPL-SCNC: 3.3 MMOL/L — LOW (ref 3.5–5.3)
POTASSIUM SERPL-SCNC: 3.8 MMOL/L — SIGNIFICANT CHANGE UP (ref 3.5–5.3)
PROT SERPL-MCNC: 5.3 G/DL — LOW (ref 6–8.3)
PROT UR-MCNC: NEGATIVE — SIGNIFICANT CHANGE UP
PROTHROM AB SERPL-ACNC: 16 SEC — HIGH (ref 10–12.9)
RBC # BLD: 3.13 M/UL — LOW (ref 4.2–5.8)
RBC # BLD: 3.27 M/UL — LOW (ref 4.2–5.8)
RBC # FLD: 13.6 % — SIGNIFICANT CHANGE UP (ref 10.3–14.5)
RBC # FLD: 13.6 % — SIGNIFICANT CHANGE UP (ref 10.3–14.5)
RBC CASTS # UR COMP ASSIST: 1 /HPF — SIGNIFICANT CHANGE UP (ref 0–4)
RH IG SCN BLD-IMP: POSITIVE — SIGNIFICANT CHANGE UP
SAO2 % BLDV: 21 % — LOW (ref 67–88)
SODIUM SERPL-SCNC: 138 MMOL/L — SIGNIFICANT CHANGE UP (ref 135–145)
SODIUM SERPL-SCNC: 141 MMOL/L — SIGNIFICANT CHANGE UP (ref 135–145)
SP GR SPEC: 1.01 — LOW (ref 1.01–1.02)
UROBILINOGEN FLD QL: NEGATIVE — SIGNIFICANT CHANGE UP
WBC # BLD: 7.2 K/UL — SIGNIFICANT CHANGE UP (ref 3.8–10.5)
WBC # BLD: 7.6 K/UL — SIGNIFICANT CHANGE UP (ref 3.8–10.5)
WBC # FLD AUTO: 7.2 K/UL — SIGNIFICANT CHANGE UP (ref 3.8–10.5)
WBC # FLD AUTO: 7.6 K/UL — SIGNIFICANT CHANGE UP (ref 3.8–10.5)
WBC UR QL: 0 /HPF — SIGNIFICANT CHANGE UP (ref 0–5)

## 2019-07-17 PROCEDURE — 99285 EMERGENCY DEPT VISIT HI MDM: CPT

## 2019-07-17 PROCEDURE — 71045 X-RAY EXAM CHEST 1 VIEW: CPT | Mod: 26

## 2019-07-17 RX ORDER — CARVEDILOL PHOSPHATE 80 MG/1
25 CAPSULE, EXTENDED RELEASE ORAL EVERY 12 HOURS
Refills: 0 | Status: DISCONTINUED | OUTPATIENT
Start: 2019-07-17 | End: 2019-07-19

## 2019-07-17 RX ORDER — PANTOPRAZOLE SODIUM 20 MG/1
40 TABLET, DELAYED RELEASE ORAL
Refills: 0 | Status: DISCONTINUED | OUTPATIENT
Start: 2019-07-17 | End: 2019-07-22

## 2019-07-17 RX ORDER — FUROSEMIDE 40 MG
20 TABLET ORAL DAILY
Refills: 0 | Status: DISCONTINUED | OUTPATIENT
Start: 2019-07-17 | End: 2019-07-19

## 2019-07-17 RX ORDER — SODIUM CHLORIDE 9 MG/ML
500 INJECTION INTRAMUSCULAR; INTRAVENOUS; SUBCUTANEOUS ONCE
Refills: 0 | Status: COMPLETED | OUTPATIENT
Start: 2019-07-17 | End: 2019-07-17

## 2019-07-17 RX ORDER — DIGOXIN 250 MCG
0.12 TABLET ORAL EVERY OTHER DAY
Refills: 0 | Status: DISCONTINUED | OUTPATIENT
Start: 2019-07-17 | End: 2019-07-18

## 2019-07-17 RX ORDER — ALLOPURINOL 300 MG
100 TABLET ORAL DAILY
Refills: 0 | Status: DISCONTINUED | OUTPATIENT
Start: 2019-07-17 | End: 2019-07-22

## 2019-07-17 RX ORDER — ASPIRIN/CALCIUM CARB/MAGNESIUM 324 MG
81 TABLET ORAL DAILY
Refills: 0 | Status: DISCONTINUED | OUTPATIENT
Start: 2019-07-17 | End: 2019-07-22

## 2019-07-17 RX ORDER — TAMSULOSIN HYDROCHLORIDE 0.4 MG/1
0.4 CAPSULE ORAL AT BEDTIME
Refills: 0 | Status: DISCONTINUED | OUTPATIENT
Start: 2019-07-17 | End: 2019-07-22

## 2019-07-17 RX ORDER — TAMSULOSIN HYDROCHLORIDE 0.4 MG/1
1 CAPSULE ORAL
Qty: 0 | Refills: 0 | DISCHARGE

## 2019-07-17 RX ORDER — APIXABAN 2.5 MG/1
2.5 TABLET, FILM COATED ORAL EVERY 12 HOURS
Refills: 0 | Status: DISCONTINUED | OUTPATIENT
Start: 2019-07-17 | End: 2019-07-22

## 2019-07-17 RX ORDER — LEVOTHYROXINE SODIUM 125 MCG
50 TABLET ORAL DAILY
Refills: 0 | Status: DISCONTINUED | OUTPATIENT
Start: 2019-07-17 | End: 2019-07-22

## 2019-07-17 RX ADMIN — APIXABAN 2.5 MILLIGRAM(S): 2.5 TABLET, FILM COATED ORAL at 17:09

## 2019-07-17 RX ADMIN — TAMSULOSIN HYDROCHLORIDE 0.4 MILLIGRAM(S): 0.4 CAPSULE ORAL at 21:48

## 2019-07-17 RX ADMIN — Medication 100 MILLIGRAM(S): at 17:09

## 2019-07-17 RX ADMIN — CARVEDILOL PHOSPHATE 25 MILLIGRAM(S): 80 CAPSULE, EXTENDED RELEASE ORAL at 17:10

## 2019-07-17 RX ADMIN — SODIUM CHLORIDE 250 MILLILITER(S): 9 INJECTION INTRAMUSCULAR; INTRAVENOUS; SUBCUTANEOUS at 13:56

## 2019-07-17 NOTE — H&P ADULT - NSICDXPASTMEDICALHX_GEN_ALL_CORE_FT
PAST MEDICAL HISTORY:  Atrial fibrillation     BPH (benign prostatic hyperplasia)     CAD (coronary artery disease)     Hypertension

## 2019-07-17 NOTE — H&P ADULT - NSHPPHYSICALEXAM_GEN_ALL_CORE
PHYSICAL EXAMINATION:  Vital Signs Last 24 Hrs  T(C): 36.3 (17 Jul 2019 10:59), Max: 36.7 (17 Jul 2019 09:55)  T(F): 97.4 (17 Jul 2019 10:59), Max: 98 (17 Jul 2019 09:55)  HR: 68 (17 Jul 2019 10:59) (67 - 68)  BP: 138/74 (17 Jul 2019 10:59) (97/60 - 138/74)  BP(mean): --  RR: 18 (17 Jul 2019 10:59) (16 - 18)  SpO2: 100% (17 Jul 2019 10:59) (100% - 100%)  CAPILLARY BLOOD GLUCOSE            GENERAL: NAD, well-groomed,  HEAD:  atraumatic, normocephalic  EYES: sclera anicteric  ENMT: mucous membranes moist  NECK: supple, No JVD  CHEST/LUNG: clear to auscultation bilaterally;    no      rales   ,   no rhonchi,   HEART: normal S1, S2  ABDOMEN: BS+, soft, ND, NT   EXTREMITIES:    no    edema    b/l LEs  NEURO: awake, ,     moves all extremities  SKIN: no     rash PHYSICAL EXAMINATION:  Vital Signs Last 24 Hrs  T(C): 36.3 (17 Jul 2019 10:59), Max: 36.7 (17 Jul 2019 09:55)  T(F): 97.4 (17 Jul 2019 10:59), Max: 98 (17 Jul 2019 09:55)  HR: 68 (17 Jul 2019 10:59) (67 - 68)  BP: 138/74 (17 Jul 2019 10:59) (97/60 - 138/74)  BP(mean): --  RR: 18 (17 Jul 2019 10:59) (16 - 18)  SpO2: 100% (17 Jul 2019 10:59) (100% - 100%)  CAPILLARY BLOOD GLUCOSE            GENERAL: NAD, well-groomed,  HEAD:  atraumatic, normocephalic  EYES: sclera anicteric  ENMT: mucous membranes moist  NECK: supple, No JVD  CHEST/LUNG: clear to auscultation bilaterally;    no      rales   ,   no rhonchi,   HEART: normal S1, S2  ABDOMEN: BS+, soft, ND, NT   EXTREMITIES:    no    edema    b/l LEs  NEURO: awake,  legs,  able  to   barely  raise  b//l  legs/ usually  walks  with a  walker  SKIN:  ecchymoses/   hyper  pigmente d lesions/ arms

## 2019-07-17 NOTE — H&P ADULT - ASSESSMENT
83 yo male with      PMHx     of CAD  / CABG, , Afib on eliquis   s/p AICD, HTN, BPH, CKD, chronic anemia,     ef  of  50,   s/p infected AICD on doxy ppx   p/w generalized weakness.      Patient reports that he woke up this AM and was trying to walk to the bathroom when his legs felt extremely weak   afebrile/  normal  sbp  and  wbc    crt  of  1.5    check  dig level/ tsh   PT  eval     from: Transthoracic Echocardiogram (02.12.18 @ 20:09) >  onclusions:  1. Calcified trileaflet aortic valve with decreased  opening. Mean transaortic valve gradient equals 10 mm Hg,  aortic valve velocity time integral equals 40 cm,  consistent with mild aortic stenosis.  2. Severely dilated left atrium.  LA volume index = 57  cc/m2.  3. Moderate concentric left ventricular hypertrophy.  4. Mild global left ventricular systolic dysfunction.  Visual estimation 50-55%.  5. Moderate right atrial enlargement.  6. Right ventricular enlargement with decreased right  ventricular systolic function. A device wire is noted in  the right heart.  *** No previous Echo exam.  < end of copied xt > 85 yo male with      PMHx     of CAD  / CABG, , Afib on eliquis   s/p AICD, HTN, BPH, CKD, chronic anemia,     ef  of  50,   s/p infected AICD on doxy ppx   p/w generalized weakness.      Patient reports that he woke up this AM and was trying to walk to the bathroom when his legs felt extremely weak  ?  chronic  leg weakness/ sarcopenia   afebrile/  normal  sbp  and  wbc    crt  of  1.5  check  dig level/ tsh   ct spine ,  no fx/  severe  osteopenia/ lumbar spondylosis    ct head, no cva   PT  eval      < from: CT Lumbar Spine No Cont (06.17.19 @ 18:57) >  IMPRESSION:    No acute fracture. Severe osteopenia. Marked dextroscoliosis. Sclerotic   old right pars interarticularis defect.  Multilevel lumbar spondylosis, as above.  < end of copied text >     from: Transthoracic Echocardiogram (02.12.18 @ 20:09) >  onclusions:  1. Calcified trileaflet aortic valve with decreased  opening. Mean transaortic valve gradient equals 10 mm Hg,  aortic valve velocity time integral equals 40 cm,  consistent with mild aortic stenosis.  2. Severely dilated left atrium.  LA volume index = 57  cc/m2.  3. Moderate concentric left ventricular hypertrophy.  4. Mild global left ventricular systolic dysfunction.  Visual estimation 50-55%.  5. Moderate right atrial enlargement.  6. Right ventricular enlargement with decreased right  ventricular systolic function. A device wire is noted in  the right heart.  *** No previous Echo exam.  < end of copied xt >

## 2019-07-17 NOTE — ED PROVIDER NOTE - PHYSICAL EXAMINATION
Gen: AAO x 3, NAD  Skin: ecchymosis over BUE and BLE  HEENT: NC/AT, PERRLA, EOMI, MMM  Resp: unlabored CTAB  Cardiac: rrr s1s2, no murmurs, rubs or gallops  GI: ND, +BS, Soft, NT  Ext: no pedal edema, FROM in all extremities  Neuro: no focal deficits

## 2019-07-17 NOTE — H&P ADULT - HISTORY OF PRESENT ILLNESS
85 yo male with     PMHx of CAD  , Afib on eliquis   s/p AICD, HTN, BPH, CKD, chronic anemia, Chronic infected AICD on doxy ppx  p/w generalized weakness.  Patient reports that he woke up this AM and was trying to walk to the bathroom when his legs felt extremely weak.  he was able to get to the bathroom and sit down, but then was to weak to get up.   Patient reports that for the past few weeks he has noticed decreased energy, poor appetite and weight loss.   Denies fevers/chills, CP, SOB, abd pain, NVD.  Daughter at bedside reports that the patient does not have any services at home. Of note, patient had a fall last month with similar circumstances.

## 2019-07-17 NOTE — CONSULT NOTE ADULT - SUBJECTIVE AND OBJECTIVE BOX
CHIEF Complaint: patient is a 84y old  Male who presents with a chief complaint of weakness (17 Jul 2019 14:24)      HPI:  85 yo male with     PMHx of CAD  , Afib on eliquis   s/p AICD, HTN, BPH, CKD, chronic anemia, Chronic infected AICD on doxy ppx  p/w generalized weakness.  Patient reports that he woke up this AM and was trying to walk to the bathroom when his legs felt extremely weak.  he was able to get to the bathroom and sit down, but then was to weak to get up.   Patient reports that for the past few weeks he has noticed decreased energy, poor appetite and weight loss.   Denies fevers/chills, CP, SOB, abd pain, NVD.  Daughter at bedside reports that the patient does not have any services at home. Of note, patient had a fall last month with similar circumstances. (17 Jul 2019 14:24)      PAST MEDICAL & SURGICAL HISTORY:  Atrial fibrillation  Hypertension  BPH (benign prostatic hyperplasia)  CAD (coronary artery disease)  AICD (automatic cardioverter/defibrillator) present  Cardiac pacemaker      MEDICATIONS  (STANDING):  allopurinol 100 milliGRAM(s) Oral daily  apixaban 2.5 milliGRAM(s) Oral every 12 hours  aspirin enteric coated 81 milliGRAM(s) Oral daily  carvedilol 25 milliGRAM(s) Oral every 12 hours  digoxin     Tablet 0.125 milliGRAM(s) Oral every other day  doxycycline hyclate Capsule 100 milliGRAM(s) Oral every 12 hours  furosemide    Tablet 20 milliGRAM(s) Oral daily  levothyroxine 50 MICROGram(s) Oral daily  pantoprazole    Tablet 40 milliGRAM(s) Oral before breakfast  tamsulosin 0.4 milliGRAM(s) Oral at bedtime    MEDICATIONS  (PRN):      FAMILY HISTORY:  No pertinent family history in first degree relatives      SOCIAL HISTORY:    [ ] Non-smoker  [ ] Smoker  [ ] Alcohol    Allergies    Bactrim (Rash)  cefadroxil (Hives)  Levaquin (Rash)  Lipitor (Rash)    Intolerances    	    REVIEW OF SYSTEMS:  CONSTITUTIONAL: No fever, weight loss, + fatigue  EYES: No eye pain, visual disturbances, or discharge  ENT:  No difficulty hearing, tinnitus, vertigo; No sinus or throat pain  NECK: No pain or stiffness  RESPIRATORY: No cough, wheezing, chills or hemoptysis; + Shortness of Breath  CARDIOVASCULAR: No chest pain, palpitations, passing out, dizziness, or leg swelling  GASTROINTESTINAL: No abdominal or epigastric pain. No nausea, vomiting, or hematemesis; No diarrhea or constipation. No melena or hematochezia.  GENITOURINARY: No dysuria, frequency, hematuria, or incontinence  NEUROLOGICAL: No headaches, memory loss, loss of strength, numbness, or tremors  SKIN: No itching, burning, rashes, or lesions   LYMPH Nodes: No enlarged glands  ENDOCRINE: No heat or cold intolerance; No hair loss  MUSCULOSKELETAL: No joint pain or swelling; No muscle, back, or extremity pain  PSYCHIATRIC: No depression, anxiety, mood swings, or difficulty sleeping  HEME/LYMPH: No easy bruising, or bleeding gums  ALLERGY AND IMMUNOLOGIC: No hives or eczema	    [ ] All others negative	  [ ] Unable to obtain    PHYSICAL EXAM:  T(C): 36.4 (07-17-19 @ 15:59), Max: 36.7 (07-17-19 @ 09:55)  HR: 86 (07-17-19 @ 15:59) (67 - 86)  BP: 118/77 (07-17-19 @ 15:59) (97/60 - 138/74)  RR: 18 (07-17-19 @ 15:59) (16 - 18)  SpO2: 99% (07-17-19 @ 15:59) (99% - 100%)  Wt(kg): --  I&O's Summary      Appearance: Normal	  HEENT:   Normal oral mucosa, PERRL, EOMI	  Lymphatic: No lymphadenopathy  Cardiovascular: Normal S1 S2, + JVD,+ murmurs, No edema  Respiratory: Lungs clear to auscultation	  Psychiatry: A & O x 3, Mood & affect appropriate  Gastrointestinal:  Soft, Non-tender, + BS	  Skin: No rashes, No ecchymoses, No cyanosis	  Neurologic: Non-focal  Extremities: Normal range of motion, No clubbing, cyanosis or edema  Vascular: Peripheral pulses palpable 2+ bilaterally    TELEMETRY: 	    ECG:  	  RADIOLOGY:  OTHER: 	  	  LABS:	 	    CARDIAC MARKERS:                              11.1   7.6   )-----------( 136      ( 17 Jul 2019 11:32 )             31.4     07-17    138  |  96  |  21  ----------------------------<  108<H>  3.8   |  27  |  1.50<H>    Ca    8.9      17 Jul 2019 11:32    TPro  5.3<L>  /  Alb  3.1<L>  /  TBili  1.0  /  DBili  x   /  AST  22  /  ALT  12  /  AlkPhos  65  07-17    proBNP:   Lipid Profile:   HgA1c:   TSH:   PT/INR - ( 17 Jul 2019 11:32 )   PT: 16.0 sec;   INR: 1.39 ratio         PTT - ( 17 Jul 2019 11:32 )  PTT:45.7 sec    PREVIOUS DIAGNOSTIC TESTING:    < from: Transthoracic Echocardiogram (02.12.18 @ 20:09) >  1. Calcified trileaflet aortic valve with decreased  opening. Mean transaortic valve gradient equals 10 mm Hg,  aortic valve velocity time integral equals 40 cm,  consistent with mild aortic stenosis.  2. Severely dilated left atrium.  LA volume index = 57  cc/m2.  3. Moderate concentric left ventricular hypertrophy.  4. Mild global left ventricular systolic dysfunction.  Visual estimation 50-55%.  5. Moderate right atrial enlargement.  6. Right ventricular enlargement with decreased right  ventricular systolic function. A device wire is noted in  the right heart.  < from: Xray Chest 1 View AP/PA (07.17.19 @ 11:32) >  Findings: The cardiac silhouette is normal in size. There is a   right-sided AICD. There are no focal consolidations or pleural effusions.   The hilar and mediastinal structures appear unremarkable. Surgical clips   overlie the right axilla.    Impression: Clear lungs.    Urinalysis + Microscopic Examination (07.17.19 @ 14:30)    Glucose Qualitative, Urine: Negative    Blood, Urine: Negative    Urine Appearance: Clear    Bilirubin: Negative    Color: Yellow    Urobilinogen: Negative    Specific Gravity: 1.009    Protein, Urine: Negative    pH Urine: 7.0    Leukocyte Esterase Concentration: Negative    Nitrite: Negative    Ketone - Urine: Negative    Red Blood Cell - Urine: 1 /hpf    White Blood Cell - Urine: 0 /HPF    Epithelial Cells: 1 /hpf    Hyaline Casts: 3 /lpf    Bacteria: Negative

## 2019-07-17 NOTE — H&P ADULT - NSHPLABSRESULTS_GEN_ALL_CORE
LABS:                        11.1   7.6   )-----------( 136      ( 17 Jul 2019 11:32 )             31.4     07-17    138  |  96  |  21  ----------------------------<  108<H>  3.8   |  27  |  1.50<H>    Ca    8.9      17 Jul 2019 11:32    TPro  5.3<L>  /  Alb  3.1<L>  /  TBili  1.0  /  DBili  x   /  AST  22  /  ALT  12  /  AlkPhos  65  07-17    PT/INR - ( 17 Jul 2019 11:32 )   PT: 16.0 sec;   INR: 1.39 ratio         PTT - ( 17 Jul 2019 11:32 )  PTT:45.7 sec            07-17 @ 11:32  3.6  20

## 2019-07-17 NOTE — ED ADULT NURSE NOTE - OBJECTIVE STATEMENT
84YOF pmh HLD, HTN, CAD presents to ED c/o generalized weakness and dizziness. Denies "room spinning." Pt denies HA, SOB, CP, abd pain, N/V/D, burning upon urinaion. Safety and comfort measures provided. Will continue to monitor. 84YOF pmh HLD, HTN, CAD presents to ED c/o generalized weakness and dizziness. Denies "room spinning." Per daughter pt has loss of appetite and difficulty walking. Pt endorses weakness and unable to walk with walker, which he usually is able to. Pt denies HA, SOB, CP, abd pain, N/V/D, burning upon urinaion. Safety and comfort measures provided. Will continue to monitor.

## 2019-07-17 NOTE — ED PROVIDER NOTE - CLINICAL SUMMARY MEDICAL DECISION MAKING FREE TEXT BOX
Dr. Mcgrath (Attending Physician)  83 yo male with PMHx of CAD, Afib on eliquis s/p AICD, HTN, BPH, CKD, chronic anemia, Chronic infected AICD on doxy ppx p/w generalized weakness.  Patient reports that he woke up this AM and was trying to walk to the bathroom when his legs felt extremely weak.  he was able to get to the bathroom and sit down, but then was to weak to get up.  Patient reports that for the past few weeks he has noticed decreased energy, poor appetite and weight loss.  Denies fevers/chills, CP, SOB, abd pain, NVD.  Daughter at bedside reports that the patient does not have any services at home. Of note, patient had a fall last month with similar circumstances. Exam without focal weakness. Lungs clear. Will check infectious and cardiac work-up.  Likely admit.

## 2019-07-17 NOTE — ED PROVIDER NOTE - ATTENDING CONTRIBUTION TO CARE
Dr. Mcgrath (Attending Physician)  I performed a history and physical exam of the patient and discussed their management with the advanced care provider. I reviewed the advanced care provider's note and agree with the documented findings and plan of care. My medical decision making and objective findings are found above.

## 2019-07-17 NOTE — ED PROVIDER NOTE - OBJECTIVE STATEMENT
85 yo male with PMHx of CAD, Afib on eliquis s/p AICD, HTN, BPH, CKD, chronic anemia, Chronic infected AICD on doxy ppx p/w generalized weakness.  Patient reports that he woke up this AM and was trying to walk to the bathroom when his legs felt extremely weak.  he was able to get to the bathroom and sit down, but then was to weak to get up.  Patient reports that for the past few weeks he has noticed decreased energy, poor appetite and weight loss.  Denies fevers/chills, CP, SOB, abd pain, NVD.  Daughter at bedside reports that the patient does not have any services at home. Of note, patient had a fall last month with similar circumstances. Dr. Mcgrath (Attending Physician)  85 yo male with PMHx of CAD, Afib on eliquis s/p AICD, HTN, BPH, CKD, chronic anemia, Chronic infected AICD on doxy ppx p/w generalized weakness.  Patient reports that he woke up this AM and was trying to walk to the bathroom when his legs felt extremely weak.  he was able to get to the bathroom and sit down, but then was to weak to get up.  Patient reports that for the past few weeks he has noticed decreased energy, poor appetite and weight loss.  Denies fevers/chills, CP, SOB, abd pain, NVD.  Daughter at bedside reports that the patient does not have any services at home. Of note, patient had a fall last month with similar circumstances.

## 2019-07-18 LAB
CRP SERPL-MCNC: 0.18 MG/DL — SIGNIFICANT CHANGE UP (ref 0–0.4)
CRP SERPL-MCNC: 0.19 MG/DL — SIGNIFICANT CHANGE UP (ref 0–0.4)
CULTURE RESULTS: SIGNIFICANT CHANGE UP
ERYTHROCYTE [SEDIMENTATION RATE] IN BLOOD: 2 MM/HR — SIGNIFICANT CHANGE UP (ref 0–20)
ERYTHROCYTE [SEDIMENTATION RATE] IN BLOOD: 2 MM/HR — SIGNIFICANT CHANGE UP (ref 0–20)
SPECIMEN SOURCE: SIGNIFICANT CHANGE UP
TSH SERPL-MCNC: 4.39 UIU/ML — HIGH (ref 0.27–4.2)

## 2019-07-18 RX ORDER — POTASSIUM CHLORIDE 20 MEQ
40 PACKET (EA) ORAL ONCE
Refills: 0 | Status: COMPLETED | OUTPATIENT
Start: 2019-07-18 | End: 2019-07-18

## 2019-07-18 RX ADMIN — Medication 100 MILLIGRAM(S): at 18:03

## 2019-07-18 RX ADMIN — Medication 81 MILLIGRAM(S): at 13:08

## 2019-07-18 RX ADMIN — PANTOPRAZOLE SODIUM 40 MILLIGRAM(S): 20 TABLET, DELAYED RELEASE ORAL at 06:02

## 2019-07-18 RX ADMIN — Medication 100 MILLIGRAM(S): at 05:11

## 2019-07-18 RX ADMIN — Medication 50 MICROGRAM(S): at 05:11

## 2019-07-18 RX ADMIN — Medication 40 MILLIEQUIVALENT(S): at 08:46

## 2019-07-18 RX ADMIN — APIXABAN 2.5 MILLIGRAM(S): 2.5 TABLET, FILM COATED ORAL at 05:11

## 2019-07-18 RX ADMIN — CARVEDILOL PHOSPHATE 25 MILLIGRAM(S): 80 CAPSULE, EXTENDED RELEASE ORAL at 05:11

## 2019-07-18 RX ADMIN — APIXABAN 2.5 MILLIGRAM(S): 2.5 TABLET, FILM COATED ORAL at 18:03

## 2019-07-18 RX ADMIN — Medication 20 MILLIGRAM(S): at 05:11

## 2019-07-18 RX ADMIN — CARVEDILOL PHOSPHATE 25 MILLIGRAM(S): 80 CAPSULE, EXTENDED RELEASE ORAL at 18:03

## 2019-07-18 RX ADMIN — TAMSULOSIN HYDROCHLORIDE 0.4 MILLIGRAM(S): 0.4 CAPSULE ORAL at 21:49

## 2019-07-18 RX ADMIN — Medication 100 MILLIGRAM(S): at 13:08

## 2019-07-18 NOTE — PHYSICAL THERAPY INITIAL EVALUATION ADULT - PERTINENT HX OF CURRENT PROBLEM, REHAB EVAL
83y/o M PMHx of CAD  , Afib on eliquis   s/p AICD, HTN, BPH, CKD, chronic anemia, Chronic infected AICD on doxy ppx p/w generalized weakness. Pt reports that he woke up this AM and was trying to walk to the bathroom when his legs felt extremely weak.  he was able to get to the bathroom and sit down, but then was to weak to get up

## 2019-07-18 NOTE — DIETITIAN INITIAL EVALUATION ADULT. - NUTRITIONGOAL OUTCOME1
[facility] progress note    Follow-Up on: 4/18/2017     Patient seen and examined on HD. Noted plans for discharge after dialysis. He is currently without complaints. ROS:  Gen - no fever, no chills, appetite okay  CV - no chest pain, no orthopnea  Lung - no shortness of breath, no cough  Abd - no tenderness, no nausea, no vomiting  Ext - no edema    Exam:  Vitals:    04/18/17 0900 04/18/17 0906 04/18/17 0931 04/18/17 0952   BP: 120/66 (!) 153/94 113/62 (!) 113/26   Pulse: 60 73 60 60   Resp:       Temp:       SpO2:       Weight:       Height:             Intake/Output Summary (Last 24 hours) at 04/18/17 1020  Last data filed at 04/18/17 0844   Gross per 24 hour   Intake              480 ml   Output             1350 ml   Net             -870 ml       GEN - in no distress  CV - S1, S2, no rub  Lung - clear bilaterally  Abd - soft, nontender  Ext - no edema    Recent Labs      04/18/17   0519  04/17/17   0522  04/16/17   0708   WBC  2.0*  3.1*  4.9   HGB  7.5*  7.5*  7.9*   HCT  22.2*  22.5*  24.0*   PLT  124*  125*  128*        No results for input(s): NA, K, CL, CO2, BUN, CREA, CA, GLU, MG, PHOS in the last 72 hours. Assessment / Plan:  Principal Problem:    Aundrea-rectal abscess (4/9/2017)    Active Problems: Thrombocytopenia ([facility] [address] (9/8/2016)      CKD (chronic kidney disease) requiring chronic dialysis ([facility] [address] (10/11/2016)      Staphylococcus epidermidis bacteremia (4/15/2017)    Seen on HD and tolerating with BP of 93/63 and HR of 71. Can resume HD tomorrow at [facility] (OUT[facility]). patient to meet 75% of estimated nutrient needs

## 2019-07-18 NOTE — DIETITIAN INITIAL EVALUATION ADULT. - OTHER INFO
Patient c/o poor appetite, weight loss 50 lbs  8 years ago following AICD placement. Patient states he lost his appetite and never got it back. Patient dislikes ensure, patients stated.  Patient is concerned about weight status and poor appetite. Discussed importance of eating for health, immunity and strength. Patient denies  denies constipation or diarrhea. patient c/o "denture plates not chewing the food well  so (I) have to take the food out of my mouth". Patient refused chopped or puree diet, patient's  daughter and wife present and stated patient is very fussy. Patient agreed to ensure 2x daily. Discussed soft choped foods patient does like, ie, eggsalad, thin sliced meat sandwiches. Patient admitted with dehydration; discussed sips of water and small meals continuously throughput day.

## 2019-07-18 NOTE — DIETITIAN INITIAL EVALUATION ADULT. - ADD RECOMMEND
with MD approval recommend appetite stimulant (discussed with NP) with MD approval recommend appetite stimulant (discussed with NP); add ensure 2x daily

## 2019-07-18 NOTE — PROGRESS NOTE ADULT - SUBJECTIVE AND OBJECTIVE BOX
CARDIOLOGY     PROGRESS  NOTE   ________________________________________________    CHIEF COMPLAINT:Patient is a 84y old  Male who presents with a chief complaint of weakness (18 Jul 2019 07:35)  doing better.  	  REVIEW OF SYSTEMS:  CONSTITUTIONAL: No fever, weight loss, or fatigue  EYES: No eye pain, visual disturbances, or discharge  ENT:  No difficulty hearing, tinnitus, vertigo; No sinus or throat pain  NECK: No pain or stiffness  RESPIRATORY: No cough, wheezing, chills or hemoptysis; No Shortness of Breath  CARDIOVASCULAR: No chest pain, palpitations, passing out, dizziness, or leg swelling  GASTROINTESTINAL: No abdominal or epigastric pain. No nausea, vomiting, or hematemesis; No diarrhea or constipation. No melena or hematochezia.  GENITOURINARY: No dysuria, frequency, hematuria, or incontinence  NEUROLOGICAL: No headaches, memory loss, loss of strength, numbness, or tremors  SKIN: No itching, burning, rashes, or lesions   LYMPH Nodes: No enlarged glands  ENDOCRINE: No heat or cold intolerance; No hair loss  MUSCULOSKELETAL: No joint pain or swelling; No muscle, back, or extremity pain  PSYCHIATRIC: No depression, anxiety, mood swings, or difficulty sleeping  HEME/LYMPH: No easy bruising, or bleeding gums  ALLERGY AND IMMUNOLOGIC: No hives or eczema	    [ ] All others negative	  [ ] Unable to obtain    PHYSICAL EXAM:  T(C): 36.4 (07-18-19 @ 05:10), Max: 36.7 (07-17-19 @ 09:55)  HR: 62 (07-18-19 @ 05:10) (60 - 86)  BP: 111/58 (07-18-19 @ 05:10) (97/60 - 138/74)  RR: 18 (07-18-19 @ 05:10) (16 - 18)  SpO2: 99% (07-18-19 @ 05:10) (96% - 100%)  Wt(kg): --  I&O's Summary    17 Jul 2019 07:01  -  18 Jul 2019 07:00  --------------------------------------------------------  IN: 240 mL / OUT: 450 mL / NET: -210 mL        Appearance: Normal	  HEENT:   Normal oral mucosa, PERRL, EOMI	  Lymphatic: No lymphadenopathy  Cardiovascular: Normal S1 S2, No JVD, No murmurs, No edema  Respiratory: Lungs clear to auscultation	  Psychiatry: A & O x 3, Mood & affect appropriate  Gastrointestinal:  Soft, Non-tender, + BS	  Skin: No rashes, No ecchymoses, No cyanosis	  Neurologic: Non-focal  Extremities: Normal range of motion, No clubbing, cyanosis or edema  Vascular: Peripheral pulses palpable 2+ bilaterally    MEDICATIONS  (STANDING):  allopurinol 100 milliGRAM(s) Oral daily  apixaban 2.5 milliGRAM(s) Oral every 12 hours  aspirin enteric coated 81 milliGRAM(s) Oral daily  carvedilol 25 milliGRAM(s) Oral every 12 hours  digoxin     Tablet 0.125 milliGRAM(s) Oral every other day  doxycycline hyclate Capsule 100 milliGRAM(s) Oral every 12 hours  furosemide    Tablet 20 milliGRAM(s) Oral daily  levothyroxine 50 MICROGram(s) Oral daily  pantoprazole    Tablet 40 milliGRAM(s) Oral before breakfast  potassium chloride    Tablet ER 40 milliEquivalent(s) Oral once  tamsulosin 0.4 milliGRAM(s) Oral at bedtime      TELEMETRY: 	    ECG:  	  RADIOLOGY:  OTHER: 	  	  LABS:	 	    CARDIAC MARKERS:                                11.0   7.2   )-----------( 137      ( 17 Jul 2019 19:33 )             32.8     07-17    141  |  99  |  21  ----------------------------<  121<H>  3.3<L>   |  28  |  1.43<H>    Ca    8.3<L>      17 Jul 2019 19:33    TPro  5.3<L>  /  Alb  3.1<L>  /  TBili  1.0  /  DBili  x   /  AST  22  /  ALT  12  /  AlkPhos  65  07-17    proBNP:   Lipid Profile:   HgA1c:   TSH: Thyroid Stimulating Hormone, Serum: 4.39 uIU/mL (07-17 @ 18:06)    PT/INR - ( 17 Jul 2019 11:32 )   PT: 16.0 sec;   INR: 1.39 ratio         PTT - ( 17 Jul 2019 11:32 )  PTT:45.7 sec      Assessment and plan  ---------------------------  pt with hx of chronic a.fib, htn, chf /HFPEF with normal ef and RV dysfunction with generalized weakness and sob.  r/o sepsis check blood cultures/esr/crp  continue cardiac meds  continue ac  needs diuretics /? r sided cath   spep/upep  check orthostatic  replete k CARDIOLOGY     PROGRESS  NOTE   ________________________________________________    CHIEF COMPLAINT:Patient is a 84y old  Male who presents with a chief complaint of weakness (18 Jul 2019 07:35)  doing better.  	  REVIEW OF SYSTEMS:  CONSTITUTIONAL: No fever, weight loss, or fatigue  EYES: No eye pain, visual disturbances, or discharge  ENT:  No difficulty hearing, tinnitus, vertigo; No sinus or throat pain  NECK: No pain or stiffness  RESPIRATORY: No cough, wheezing, chills or hemoptysis; No Shortness of Breath  CARDIOVASCULAR: No chest pain, palpitations, passing out, dizziness, or leg swelling  GASTROINTESTINAL: No abdominal or epigastric pain. No nausea, vomiting, or hematemesis; No diarrhea or constipation. No melena or hematochezia.  GENITOURINARY: No dysuria, frequency, hematuria, or incontinence  NEUROLOGICAL: No headaches, memory loss, loss of strength, numbness, or tremors  SKIN: No itching, burning, rashes, or lesions   LYMPH Nodes: No enlarged glands  ENDOCRINE: No heat or cold intolerance; No hair loss  MUSCULOSKELETAL: No joint pain or swelling; No muscle, back, or extremity pain  PSYCHIATRIC: No depression, anxiety, mood swings, or difficulty sleeping  HEME/LYMPH: No easy bruising, or bleeding gums  ALLERGY AND IMMUNOLOGIC: No hives or eczema	    [ ] All others negative	  [ ] Unable to obtain    PHYSICAL EXAM:  T(C): 36.4 (07-18-19 @ 05:10), Max: 36.7 (07-17-19 @ 09:55)  HR: 62 (07-18-19 @ 05:10) (60 - 86)  BP: 111/58 (07-18-19 @ 05:10) (97/60 - 138/74)  RR: 18 (07-18-19 @ 05:10) (16 - 18)  SpO2: 99% (07-18-19 @ 05:10) (96% - 100%)  Wt(kg): --  I&O's Summary    17 Jul 2019 07:01  -  18 Jul 2019 07:00  --------------------------------------------------------  IN: 240 mL / OUT: 450 mL / NET: -210 mL        Appearance: Normal	  HEENT:   Normal oral mucosa, PERRL, EOMI	  Lymphatic: No lymphadenopathy  Cardiovascular: Normal S1 S2, No JVD, No murmurs, No edema  Respiratory: Lungs clear to auscultation	  Psychiatry: A & O x 3, Mood & affect appropriate  Gastrointestinal:  Soft, Non-tender, + BS	  Skin: No rashes, No ecchymoses, No cyanosis	  Neurologic: Non-focal  Extremities: Normal range of motion, No clubbing, cyanosis or edema  Vascular: Peripheral pulses palpable 2+ bilaterally    MEDICATIONS  (STANDING):  allopurinol 100 milliGRAM(s) Oral daily  apixaban 2.5 milliGRAM(s) Oral every 12 hours  aspirin enteric coated 81 milliGRAM(s) Oral daily  carvedilol 25 milliGRAM(s) Oral every 12 hours  digoxin     Tablet 0.125 milliGRAM(s) Oral every other day  doxycycline hyclate Capsule 100 milliGRAM(s) Oral every 12 hours  furosemide    Tablet 20 milliGRAM(s) Oral daily  levothyroxine 50 MICROGram(s) Oral daily  pantoprazole    Tablet 40 milliGRAM(s) Oral before breakfast  potassium chloride    Tablet ER 40 milliEquivalent(s) Oral once  tamsulosin 0.4 milliGRAM(s) Oral at bedtime      TELEMETRY: 	    ECG:  	  RADIOLOGY:  OTHER: 	  	  LABS:	 	    CARDIAC MARKERS:                                11.0   7.2   )-----------( 137      ( 17 Jul 2019 19:33 )             32.8     07-17    141  |  99  |  21  ----------------------------<  121<H>  3.3<L>   |  28  |  1.43<H>    Ca    8.3<L>      17 Jul 2019 19:33    TPro  5.3<L>  /  Alb  3.1<L>  /  TBili  1.0  /  DBili  x   /  AST  22  /  ALT  12  /  AlkPhos  65  07-17    proBNP:   Lipid Profile:   HgA1c:   TSH: Thyroid Stimulating Hormone, Serum: 4.39 uIU/mL (07-17 @ 18:06)    PT/INR - ( 17 Jul 2019 11:32 )   PT: 16.0 sec;   INR: 1.39 ratio         PTT - ( 17 Jul 2019 11:32 )  PTT:45.7 sec      Assessment and plan  ---------------------------  pt with hx of chronic a.fib, htn, chf /HFPEF with normal ef and RV dysfunction with generalized weakness and sob.  r/o sepsis check blood cultures/esr/crp  continue cardiac meds  continue ac  needs diuretics /? r sided cath   spep/upep  check orthostatic  replete k  dc digoxin hr is controlled, avoid dig toxicity

## 2019-07-18 NOTE — PROGRESS NOTE ADULT - SUBJECTIVE AND OBJECTIVE BOX
no cp/sob  REVIEW OF SYSTEMS:  GEN: no fever,    no chills  RESP: no SOB,   no cough  CVS: no chest pain,   no palpitations  GI: no abdominal pain,   no nausea,   no vomiting,   no constipation,   no diarrhea  : no dysuria,   no frequency  NEURO: no headache,   no dizziness  PSYCH: no depression,   not anxious  Derm : no rash    MEDICATIONS  (STANDING):  allopurinol 100 milliGRAM(s) Oral daily  apixaban 2.5 milliGRAM(s) Oral every 12 hours  aspirin enteric coated 81 milliGRAM(s) Oral daily  carvedilol 25 milliGRAM(s) Oral every 12 hours  digoxin     Tablet 0.125 milliGRAM(s) Oral every other day  doxycycline hyclate Capsule 100 milliGRAM(s) Oral every 12 hours  furosemide    Tablet 20 milliGRAM(s) Oral daily  levothyroxine 50 MICROGram(s) Oral daily  pantoprazole    Tablet 40 milliGRAM(s) Oral before breakfast  potassium chloride    Tablet ER 40 milliEquivalent(s) Oral once  tamsulosin 0.4 milliGRAM(s) Oral at bedtime    MEDICATIONS  (PRN):      Vital Signs Last 24 Hrs  T(C): 36.4 (2019 05:10), Max: 36.7 (2019 09:55)  T(F): 97.6 (2019 05:10), Max: 98 (2019 09:55)  HR: 62 (2019 05:10) (60 - 86)  BP: 111/58 (2019 05:10) (97/60 - 138/74)  BP(mean): --  RR: 18 (2019 05:10) (16 - 18)  SpO2: 99% (2019 05:10) (96% - 100%)  CAPILLARY BLOOD GLUCOSE        I&O's Summary    2019 07:01  -  2019 07:00  --------------------------------------------------------  IN: 240 mL / OUT: 450 mL / NET: -210 mL        PHYSICAL EXAM:  HEAD:  Atraumatic, Normocephalic  NECK: Supple, No   JVD  CHEST/LUNG:   no     rales,     no,    rhonchi  HEART: Regular rate and rhythm;         murmur  ABDOMEN: Soft, Nontender, ;   EXTREMITIES:    no    edema  NEUROLOGY:  alert    LABS:                        11.0   7.2   )-----------( 137      ( 2019 19:33 )             32.8         141  |  99  |  21  ----------------------------<  121<H>  3.3<L>   |  28  |  1.43<H>    Ca    8.3<L>      2019 19:33    TPro  5.3<L>  /  Alb  3.1<L>  /  TBili  1.0  /  DBili  x   /  AST  22  /  ALT  12  /  AlkPhos  65      PT/INR - ( 2019 11:32 )   PT: 16.0 sec;   INR: 1.39 ratio         PTT - ( 2019 11:32 )  PTT:45.7 sec      Urinalysis Basic - ( 2019 14:30 )    Color: Yellow / Appearance: Clear / S.009 / pH: x  Gluc: x / Ketone: Negative  / Bili: Negative / Urobili: Negative   Blood: x / Protein: Negative / Nitrite: Negative   Leuk Esterase: Negative / RBC: 1 /hpf / WBC 0 /HPF   Sq Epi: x / Non Sq Epi: 1 /hpf / Bacteria: Negative           @ 11:32  3.6  20      Thyroid Stimulating Hormone, Serum: 4.39 uIU/mL ( @ 18:06)          Consultant(s) Notes Reviewed:      Care Discussed with Consultants/Other Providers:

## 2019-07-18 NOTE — PROGRESS NOTE ADULT - ASSESSMENT
83 yo male with      PMHx     of CAD  / CABG, , Afib on eliquis   s/p AICD, HTN, BPH, CKD, chronic anemia,     ef  of  50,   s/p infected AICD on doxy ppx     p/w generalized weakness.      Patient reports that he woke up this AM and was trying to walk to the bathroom when his legs felt extremely weak  ?  chronic  leg weakness/ sarcopenia   afebrile/  normal  sbp  and  wbc    crt  of  1.5/ follow  cretainine  elevated  tsh/ synthroid   ct spine ,  no fx/  severe  osteopenia/ lumbar spondylosis    ct head, no cva   PT  eval/  neuro  eval      < from: CT Lumbar Spine No Cont (06.17.19 @ 18:57) >  IMPRESSION:    No acute fracture. Severe osteopenia. Marked dextroscoliosis. Sclerotic   old right pars interarticularis defect.  Multilevel lumbar spondylosis, as above.  < end of copied text >     from: Transthoracic Echocardiogram (02.12.18 @ 20:09) >  onclusions:  1. Calcified trileaflet aortic valve with decreased  opening. Mean transaortic valve gradient equals 10 mm Hg,  aortic valve velocity time integral equals 40 cm,  consistent with mild aortic stenosis.  2. Severely dilated left atrium.  LA volume index = 57  cc/m2.  3. Moderate concentric left ventricular hypertrophy.  4. Mild global left ventricular systolic dysfunction.  Visual estimation 50-55%.  5. Moderate right atrial enlargement.  6. Right ventricular enlargement with decreased right  ventricular systolic function. A device wire is noted in  the right heart.  *** No previous Echo exam.  < end of copied xt >

## 2019-07-18 NOTE — DIETITIAN INITIAL EVALUATION ADULT. - ENERGY NEEDS
IBW= 154  lbs +/- 10%   85 yo male with PMHx of CAD, Afib on eliquis, s/p AICD, HTN, BPH, CKD, chronic anemia, Chronic infected AICD on doxy ppx  p/w generalized weakness.  Patient reports that he woke up this AM and was trying to walk to the bathroom when his legs felt extremely weak.  he was able to get to the bathroom and sit down, but then was to weak to get up.   Patient reports that for the past few weeks he has noticed decreased energy, poor appetite and weight loss.   Denies fevers/chills, CP, SOB, abd pain, NVD.  Daughter at bedside reports that the patient does not have any services at home. Of note, patient had a fall last month with similar circumstances  Dx: weakness, CAD, HTN, Afib, BPH

## 2019-07-18 NOTE — PHYSICAL THERAPY INITIAL EVALUATION ADULT - GAIT DEVIATIONS NOTED, PT EVAL
decreased step length/decreased weight-shifting ability/decreased endurance/decreased velocity of limb motion/decreased jayson

## 2019-07-18 NOTE — DIETITIAN INITIAL EVALUATION ADULT. - SIGNS/SYMPTOMS
dehydration, weight loss scotty QUESADA approval recommend appetite stimulant (discussed with NP); add ensure 2x daily

## 2019-07-18 NOTE — PHYSICAL THERAPY INITIAL EVALUATION ADULT - PRECAUTIONS/LIMITATIONS, REHAB EVAL
Pt reports that for the past few weeks he has noticed decreased energy, poor appetite and weight loss. Denies fevers/chills, CP, SOB, abd pain, NVD.  Daughter at bedside reports that the patient does not have any services at home. Of note, patient had a fall last month with similar circumstances. CXR (-) fall precautions/Pt reports that for the past few weeks he has noticed decreased energy, poor appetite and weight loss. Denies fevers/chills, CP, SOB, abd pain, NVD.  Daughter at bedside reports that the patient does not have any services at home. Of note, patient had a fall last month with similar circumstances. CXR (-)

## 2019-07-18 NOTE — PHYSICAL THERAPY INITIAL EVALUATION ADULT - ADDITIONAL COMMENTS
Pt lives in private home with son and wife. Pt has 1 step to enter and no steps to negotiate inside home. Prior to admission pt states her required assist with functional mobility and used a rollator to ambulate.

## 2019-07-19 ENCOUNTER — TRANSCRIPTION ENCOUNTER (OUTPATIENT)
Age: 84
End: 2019-07-19

## 2019-07-19 LAB
ANION GAP SERPL CALC-SCNC: 11 MMOL/L — SIGNIFICANT CHANGE UP (ref 5–17)
BUN SERPL-MCNC: 25 MG/DL — HIGH (ref 7–23)
CALCIUM SERPL-MCNC: 8 MG/DL — LOW (ref 8.4–10.5)
CHLORIDE SERPL-SCNC: 101 MMOL/L — SIGNIFICANT CHANGE UP (ref 96–108)
CO2 SERPL-SCNC: 30 MMOL/L — SIGNIFICANT CHANGE UP (ref 22–31)
CREAT SERPL-MCNC: 1.47 MG/DL — HIGH (ref 0.5–1.3)
GLUCOSE SERPL-MCNC: 83 MG/DL — SIGNIFICANT CHANGE UP (ref 70–99)
MAGNESIUM SERPL-MCNC: 1.4 MG/DL — LOW (ref 1.6–2.6)
POTASSIUM SERPL-MCNC: 3.6 MMOL/L — SIGNIFICANT CHANGE UP (ref 3.5–5.3)
POTASSIUM SERPL-SCNC: 3.6 MMOL/L — SIGNIFICANT CHANGE UP (ref 3.5–5.3)
SODIUM SERPL-SCNC: 142 MMOL/L — SIGNIFICANT CHANGE UP (ref 135–145)

## 2019-07-19 RX ORDER — CARVEDILOL PHOSPHATE 80 MG/1
3.12 CAPSULE, EXTENDED RELEASE ORAL EVERY 12 HOURS
Refills: 0 | Status: DISCONTINUED | OUTPATIENT
Start: 2019-07-19 | End: 2019-07-19

## 2019-07-19 RX ORDER — CARVEDILOL PHOSPHATE 80 MG/1
12.5 CAPSULE, EXTENDED RELEASE ORAL EVERY 12 HOURS
Refills: 0 | Status: DISCONTINUED | OUTPATIENT
Start: 2019-07-19 | End: 2019-07-19

## 2019-07-19 RX ORDER — SODIUM CHLORIDE 9 MG/ML
1000 INJECTION INTRAMUSCULAR; INTRAVENOUS; SUBCUTANEOUS
Refills: 0 | Status: DISCONTINUED | OUTPATIENT
Start: 2019-07-19 | End: 2019-07-19

## 2019-07-19 RX ORDER — SODIUM CHLORIDE 9 MG/ML
1000 INJECTION INTRAMUSCULAR; INTRAVENOUS; SUBCUTANEOUS
Refills: 0 | Status: DISCONTINUED | OUTPATIENT
Start: 2019-07-19 | End: 2019-07-20

## 2019-07-19 RX ORDER — MAGNESIUM SULFATE 500 MG/ML
2 VIAL (ML) INJECTION ONCE
Refills: 0 | Status: COMPLETED | OUTPATIENT
Start: 2019-07-19 | End: 2019-07-19

## 2019-07-19 RX ORDER — CARVEDILOL PHOSPHATE 80 MG/1
12.5 CAPSULE, EXTENDED RELEASE ORAL EVERY 12 HOURS
Refills: 0 | Status: DISCONTINUED | OUTPATIENT
Start: 2019-07-19 | End: 2019-07-22

## 2019-07-19 RX ADMIN — Medication 100 MILLIGRAM(S): at 17:33

## 2019-07-19 RX ADMIN — TAMSULOSIN HYDROCHLORIDE 0.4 MILLIGRAM(S): 0.4 CAPSULE ORAL at 21:29

## 2019-07-19 RX ADMIN — Medication 100 MILLIGRAM(S): at 11:44

## 2019-07-19 RX ADMIN — PANTOPRAZOLE SODIUM 40 MILLIGRAM(S): 20 TABLET, DELAYED RELEASE ORAL at 08:51

## 2019-07-19 RX ADMIN — APIXABAN 2.5 MILLIGRAM(S): 2.5 TABLET, FILM COATED ORAL at 08:51

## 2019-07-19 RX ADMIN — Medication 50 GRAM(S): at 12:04

## 2019-07-19 RX ADMIN — SODIUM CHLORIDE 75 MILLILITER(S): 9 INJECTION INTRAMUSCULAR; INTRAVENOUS; SUBCUTANEOUS at 11:51

## 2019-07-19 RX ADMIN — Medication 81 MILLIGRAM(S): at 11:44

## 2019-07-19 RX ADMIN — SODIUM CHLORIDE 40 MILLILITER(S): 9 INJECTION INTRAMUSCULAR; INTRAVENOUS; SUBCUTANEOUS at 15:10

## 2019-07-19 RX ADMIN — APIXABAN 2.5 MILLIGRAM(S): 2.5 TABLET, FILM COATED ORAL at 17:33

## 2019-07-19 RX ADMIN — CARVEDILOL PHOSPHATE 12.5 MILLIGRAM(S): 80 CAPSULE, EXTENDED RELEASE ORAL at 17:33

## 2019-07-19 RX ADMIN — Medication 20 MILLIGRAM(S): at 08:51

## 2019-07-19 RX ADMIN — Medication 100 MILLIGRAM(S): at 08:51

## 2019-07-19 RX ADMIN — Medication 50 MICROGRAM(S): at 08:51

## 2019-07-19 NOTE — PROGRESS NOTE ADULT - ASSESSMENT
83 yo male with      PMHx     of CAD  / CABG, , Afib on eliquis   s/p AICD, HTN, BPH, CKD, chronic anemia,     ef  of  50,   s/p infected AICD on doxy ppx     p/w generalized weakness.      Patient reports that he woke up this AM and was trying to walk to the bathroom when his legs felt extremely weak  ?  chronic  leg weakness/ sarcopenia   afebrile/     crt  of  1.5/ follow  cretainine  elevated  tsh/ ON  synthroid   ct spine ,  no fx/  severe  osteopenia/ lumbar spondylosis    ct head, no cva   PT  eval/  neuro  eval  still pending  needs rehab per  PT      < from: CT Lumbar Spine No Cont (06.17.19 @ 18:57) >  IMPRESSION:    No acute fracture. Severe osteopenia. Marked dextroscoliosis. Sclerotic   old right pars interarticularis defect.  Multilevel lumbar spondylosis, as above.  < end of copied text >     from: Transthoracic Echocardiogram (02.12.18 @ 20:09) >  onclusions:  1. Calcified trileaflet aortic valve with decreased  opening. Mean transaortic valve gradient equals 10 mm Hg,  aortic valve velocity time integral equals 40 cm,  consistent with mild aortic stenosis.  2. Severely dilated left atrium.  LA volume index = 57  cc/m2.  3. Moderate concentric left ventricular hypertrophy.  4. Mild global left ventricular systolic dysfunction.  Visual estimation 50-55%.  5. Moderate right atrial enlargement.  6. Right ventricular enlargement with decreased right  ventricular systolic function. A device wire is noted in  the right heart.  *** No previous Echo exam.  < end of copied xt > 83 yo male with      PMHx     of CAD  / CABG, , Afib on eliquis   s/p AICD, HTN, BPH, CKD, chronic anemia,     ef  of  50,   s/p infected AICD on doxy ppx     p/w generalized weakness.      Patient reports that he woke up this AM and was trying to walk to the bathroom when his legs felt extremely weak  ?  chronic  leg weakness/ sarcopenia   afebrile/     crt  of  1.5/ follow  cretainine  elevated  tsh/ ON  synthroid   ct spine ,  no fx/  severe  osteopenia/ lumbar spondylosis    ct head, no cva   PT  eval/  neuro  eval   noted, no intervention  needs rehab per  PT  orthostatic  now/  fluids  for  18 hrs/  hold  lasix  for today/  per family, pt has not been drinking fluids  for past few  days  spoke with daughter  np here/  pts  card  does not come  here/ in [place]/   hous e  ep to see pt/  no aware      < from: CT Lumbar Spine No Cont (06.17.19 @ 18:57) >  IMPRESSION:    No acute fracture. Severe osteopenia. Marked dextroscoliosis. Sclerotic   old right pars interarticularis defect.  Multilevel lumbar spondylosis, as above.  < end of copied text >     from: Transthoracic Echocardiogram (02.12.18 @ 20:09) >  onclusions:  1. Calcified trileaflet aortic valve with decreased  opening. Mean transaortic valve gradient equals 10 mm Hg,  aortic valve velocity time integral equals 40 cm,  consistent with mild aortic stenosis.  2. Severely dilated left atrium.  LA volume index = 57  cc/m2.  3. Moderate concentric left ventricular hypertrophy.  4. Mild global left ventricular systolic dysfunction.  Visual estimation 50-55%.  5. Moderate right atrial enlargement.  6. Right ventricular enlargement with decreased right  ventricular systolic function. A device wire is noted in  the right heart.  *** No previous Echo exam.  < end of copied xt >

## 2019-07-19 NOTE — PROGRESS NOTE ADULT - SUBJECTIVE AND OBJECTIVE BOX
no  cp./sob    REVIEW OF SYSTEMS:  GEN: no fever,    no chills  RESP: no SOB,   no cough  CVS: no chest pain,   no palpitations  GI: no abdominal pain,   no nausea,   no vomiting,   no constipation,   no diarrhea  : no dysuria,   no frequency  NEURO: no headache,   no dizziness  PSYCH: no depression,   not anxious  Derm : no rash    MEDICATIONS  (STANDING):  allopurinol 100 milliGRAM(s) Oral daily  apixaban 2.5 milliGRAM(s) Oral every 12 hours  aspirin enteric coated 81 milliGRAM(s) Oral daily  carvedilol 25 milliGRAM(s) Oral every 12 hours  doxycycline hyclate Capsule 100 milliGRAM(s) Oral every 12 hours  furosemide    Tablet 20 milliGRAM(s) Oral daily  levothyroxine 50 MICROGram(s) Oral daily  pantoprazole    Tablet 40 milliGRAM(s) Oral before breakfast  tamsulosin 0.4 milliGRAM(s) Oral at bedtime    MEDICATIONS  (PRN):      Vital Signs Last 24 Hrs  T(C): 36.2 (2019 04:32), Max: 36.4 (2019 12:33)  T(F): 97.1 (2019 04:32), Max: 97.5 (2019 12:33)  HR: 66 (2019 04:32) (63 - 80)  BP: 98/64 (2019 04:32) (98/64 - 111/64)  BP(mean): --  RR: 18 (2019 04:32) (17 - 18)  SpO2: 100% (2019 04:32) (99% - 100%)  CAPILLARY BLOOD GLUCOSE        I&O's Summary    2019 07:01  -  2019 07:00  --------------------------------------------------------  IN: 1580 mL / OUT: 475 mL / NET: 1105 mL        PHYSICAL EXAM:  HEAD:  Atraumatic, Normocephalic  NECK: Supple, No   JVD  CHEST/LUNG:   no     rales,     no,    rhonchi  HEART: Regular rate and rhythm;         murmur  ABDOMEN: Soft, Nontender, ;   EXTREMITIES:     no   edema  NEUROLOGY:  alert    LABS:                        11.0   7.2   )-----------( 137      ( 2019 19:33 )             32.8         141  |  99  |  21  ----------------------------<  121<H>  3.3<L>   |  28  |  1.43<H>    Ca    8.3<L>      2019 19:33    TPro  5.3<L>  /  Alb  3.1<L>  /  TBili  1.0  /  DBili  x   /  AST  22  /  ALT  12  /  AlkPhos  65      PT/INR - ( 2019 11:32 )   PT: 16.0 sec;   INR: 1.39 ratio         PTT - ( 2019 11:32 )  PTT:45.7 sec      Urinalysis Basic - ( 2019 14:30 )    Color: Yellow / Appearance: Clear / S.009 / pH: x  Gluc: x / Ketone: Negative  / Bili: Negative / Urobili: Negative   Blood: x / Protein: Negative / Nitrite: Negative   Leuk Esterase: Negative / RBC: 1 /hpf / WBC 0 /HPF   Sq Epi: x / Non Sq Epi: 1 /hpf / Bacteria: Negative           @ 11:32  3.6  20      Thyroid Stimulating Hormone, Serum: 4.39 uIU/mL ( @ 18:06)          Consultant(s) Notes Reviewed:      Care Discussed with Consultants/Other Providers:

## 2019-07-19 NOTE — PROGRESS NOTE ADULT - SUBJECTIVE AND OBJECTIVE BOX
CARDIOLOGY     PROGRESS  NOTE   ________________________________________________    CHIEF COMPLAINT:Patient is a 84y old  Male who presents with a chief complaint of weakness (19 Jul 2019 07:34)    	  REVIEW OF SYSTEMS:  CONSTITUTIONAL: No fever, weight loss, or fatigue  EYES: No eye pain, visual disturbances, or discharge  ENT:  No difficulty hearing, tinnitus, vertigo; No sinus or throat pain  NECK: No pain or stiffness  RESPIRATORY: No cough, wheezing, chills or hemoptysis; No Shortness of Breath  CARDIOVASCULAR: No chest pain, palpitations, passing out, dizziness, or leg swelling  GASTROINTESTINAL: No abdominal or epigastric pain. No nausea, vomiting, or hematemesis; No diarrhea or constipation. No melena or hematochezia.  GENITOURINARY: No dysuria, frequency, hematuria, or incontinence  NEUROLOGICAL: No headaches, memory loss, loss of strength, numbness, or tremors  SKIN: No itching, burning, rashes, or lesions   LYMPH Nodes: No enlarged glands  ENDOCRINE: No heat or cold intolerance; No hair loss  MUSCULOSKELETAL: No joint pain or swelling; No muscle, back, or extremity pain  PSYCHIATRIC: No depression, anxiety, mood swings, or difficulty sleeping  HEME/LYMPH: No easy bruising, or bleeding gums  ALLERGY AND IMMUNOLOGIC: No hives or eczema	    [ ] All others negative	  [ ] Unable to obtain    PHYSICAL EXAM:  T(C): 36.2 (07-19-19 @ 04:32), Max: 36.4 (07-18-19 @ 12:33)  HR: 66 (07-19-19 @ 04:32) (63 - 80)  BP: 98/64 (07-19-19 @ 04:32) (98/64 - 111/64)  RR: 18 (07-19-19 @ 04:32) (17 - 18)  SpO2: 100% (07-19-19 @ 04:32) (99% - 100%)  Wt(kg): --  I&O's Summary    18 Jul 2019 07:01  -  19 Jul 2019 07:00  --------------------------------------------------------  IN: 1580 mL / OUT: 475 mL / NET: 1105 mL        Appearance: Normal	  HEENT:   Normal oral mucosa, PERRL, EOMI	  Lymphatic: No lymphadenopathy  Cardiovascular: Normal S1 S2, No JVD, + murmurs, No edema  Respiratory: Lungs clear to auscultation	  Psychiatry: A & O x 3, Mood & affect appropriate  Gastrointestinal:  Soft, Non-tender, + BS	  Skin: No rashes, No ecchymoses, No cyanosis	  Neurologic: Non-focal  Extremities: Normal range of motion, No clubbing, cyanosis or edema  Vascular: Peripheral pulses palpable 2+ bilaterally    MEDICATIONS  (STANDING):  allopurinol 100 milliGRAM(s) Oral daily  apixaban 2.5 milliGRAM(s) Oral every 12 hours  aspirin enteric coated 81 milliGRAM(s) Oral daily  carvedilol 12.5 milliGRAM(s) Oral every 12 hours  doxycycline hyclate Capsule 100 milliGRAM(s) Oral every 12 hours  furosemide    Tablet 20 milliGRAM(s) Oral daily  levothyroxine 50 MICROGram(s) Oral daily  pantoprazole    Tablet 40 milliGRAM(s) Oral before breakfast  tamsulosin 0.4 milliGRAM(s) Oral at bedtime      TELEMETRY: 	    ECG:  	  RADIOLOGY:  OTHER: 	  	  LABS:	 	    CARDIAC MARKERS:                                11.0   7.2   )-----------( 137      ( 17 Jul 2019 19:33 )             32.8     07-17    141  |  99  |  21  ----------------------------<  121<H>  3.3<L>   |  28  |  1.43<H>    Ca    8.3<L>      17 Jul 2019 19:33    TPro  5.3<L>  /  Alb  3.1<L>  /  TBili  1.0  /  DBili  x   /  AST  22  /  ALT  12  /  AlkPhos  65  07-17    proBNP:   Lipid Profile:   HgA1c:   TSH: Thyroid Stimulating Hormone, Serum: 4.39 uIU/mL (07-17 @ 18:06)    PT/INR - ( 17 Jul 2019 11:32 )   PT: 16.0 sec;   INR: 1.39 ratio         PTT - ( 17 Jul 2019 11:32 )  PTT:45.7 sec      Assessment and plan  ---------------------------  pt with hx of chronic a.fib, htn, chf /HFPEF with normal ef and RV dysfunction with generalized weakness and sob.  r/o sepsis check blood cultures/esr/crp  continue cardiac meds  continue ac  needs diuretics /? r sided cath   spep/upep  check orthostatic  replete k  dc digoxin hr is controlled, avoid dig toxicity

## 2019-07-19 NOTE — CONSULT NOTE ADULT - SUBJECTIVE AND OBJECTIVE BOX
Admitting Diagnosis:  Weakness (R53.1): WEAKNESS      HPI:  This is a 84y year old Male with the below past medical history who presents with the chief complaint of weakness.     PMHx of CAD  , Afib on eliquis   s/p AICD, HTN, BPH, CKD, chronic anemia, Chronic infected AICD on doxy ppx  p/w generalized weakness.  Patient reports that he woke up at home and was trying to walk to the bathroom when his legs felt extremely weak.  he was able to get to the bathroom and sit down, but then was to weak to get up.   Patient reports that for the past few weeks he has noticed decreased energy, poor appetite and weight loss.   Denies fevers/chills, CP, SOB, abd pain, NVD.     pt says that feels weak all over, not one focal arm or leg, no focal numbness, no changes in speech, swallow, vision, bowel or bladder control. No sx neck or low back pain.  In the hospital, says feels dizzy when stand, RN notes positive orthostatics.  ******    Past Medical History:  Atrial fibrillation (I48.91)  Hypertension (I10)  BPH (benign prostatic hyperplasia) (N40.0)  CAD (coronary artery disease) (I25.10)      Past Surgical History:  AICD (automatic cardioverter/defibrillator) present (Z95.810)  Cardiac pacemaker (Z95.0)      Social History:  No toxic habits    Family History:  FAMILY HISTORY:  No pertinent family history in first degree relatives      Allergies:  Bactrim (Rash)  cefadroxil (Hives)  Levaquin (Rash)  Lipitor (Rash)      ROS:  Constitutional: Patient offers no complaints of fevers or significant weight loss  Ears, Nose, Mouth and Throat: The patient presents with no abnormalities of the head, ears, eyes, nose or throat  Skin: Patient offers no concerns of new rashes or lesions  Respiratory: The patient presents with no abnormalities of the respiratory tract  Cardiovascular: The patient presents with no cardiac abnormalities  Gastrointestinal: The patient presents with no abnormalities of the GI system  Genitourinary: The patient presents with no dysuria, hematuria or frequent urination  Neurological: See HPI  Endocrine: Patient offers no complaints of excessive thirst, urination, or heat/cold intolerance    Advanced care planning reviewed and noted in the chart.    Medications:  allopurinol 100 milliGRAM(s) Oral daily  apixaban 2.5 milliGRAM(s) Oral every 12 hours  aspirin enteric coated 81 milliGRAM(s) Oral daily  carvedilol 12.5 milliGRAM(s) Oral every 12 hours  doxycycline hyclate Capsule 100 milliGRAM(s) Oral every 12 hours  furosemide    Tablet 20 milliGRAM(s) Oral daily  levothyroxine 50 MICROGram(s) Oral daily  pantoprazole    Tablet 40 milliGRAM(s) Oral before breakfast  tamsulosin 0.4 milliGRAM(s) Oral at bedtime      Labs:  CBC Full  -  ( 2019 19:33 )  WBC Count : 7.2 K/uL  RBC Count : 3.27 M/uL  Hemoglobin : 11.0 g/dL  Hematocrit : 32.8 %  Platelet Count - Automated : 137 K/uL  Mean Cell Volume : 100.0 fl  Mean Cell Hemoglobin : 33.5 pg  Mean Cell Hemoglobin Concentration : 33.4 gm/dL  Auto Neutrophil # : x  Auto Lymphocyte # : x  Auto Monocyte # : x  Auto Eosinophil # : x  Auto Basophil # : x  Auto Neutrophil % : x  Auto Lymphocyte % : x  Auto Monocyte % : x  Auto Eosinophil % : x  Auto Basophil % : x        142  |  101  |  25<H>  ----------------------------<  83  3.6   |  30  |  1.47<H>    Ca    8.0<L>      2019 07:30  Mg     1.4         TPro  5.3<L>  /  Alb  3.1<L>  /  TBili  1.0  /  DBili  x   /  AST  22  /  ALT  12  /  AlkPhos  65  07-17    CAPILLARY BLOOD GLUCOSE        LIVER FUNCTIONS - ( 2019 11:32 )  Alb: 3.1 g/dL / Pro: 5.3 g/dL / ALK PHOS: 65 U/L / ALT: 12 U/L / AST: 22 U/L / GGT: x           PT/INR - ( 2019 11:32 )   PT: 16.0 sec;   INR: 1.39 ratio         PTT - ( 2019 11:32 )  PTT:45.7 sec  Urinalysis Basic - ( 2019 14:30 )    Color: Yellow / Appearance: Clear / S.009 / pH: x  Gluc: x / Ketone: Negative  / Bili: Negative / Urobili: Negative   Blood: x / Protein: Negative / Nitrite: Negative   Leuk Esterase: Negative / RBC: 1 /hpf / WBC 0 /HPF   Sq Epi: x / Non Sq Epi: 1 /hpf / Bacteria: Negative      Male    Vitals:  Vital Signs Last 24 Hrs  T(C): 36.2 (2019 04:32), Max: 36.4 (2019 12:33)  T(F): 97.1 (2019 04:32), Max: 97.5 (2019 12:33)  HR: 66 (2019 04:32) (63 - 80)  BP: 98/64 (2019 04:32) (98/64 - 111/64)  BP(mean): --  RR: 18 (2019 08:20) (17 - 18)  SpO2: 95% (2019 08:20) (95% - 100%)    NEUROLOGICAL EXAM:    Mental status: Awake, alert, and in no apparent distress. Oriented to person, place and time. Language function is normal. Recent memory, digit span and concentration were normal.     Cranial Nerves: Pupils were equal, round, reactive to light. Extraocular movements were intact. Visual field were full. Fundoscopic exam was deferred. Facial sensation was intact to light touch. There was no facial asymmetry. The palate was upgoing symmetrically and tongue was midline. Hearing acuity was intact to finger rub AU. Shoulder shrug was full bilaterally    Motor exam: Bulk and tone were normal. Strength was 5/5 in all four extremities except hip flexion is 5-/5 . Fine finger movements were symmetric and normal. There was no pronator drift    Reflexes: 2+ in the bilateral upper extremities. 1in the bilateral lower extremities. Toes were downgoing bilaterally.     Sensation: Intact to light touch, temperature,  but diminihsed vibration and proprioception.     Coordination: Finger-nose-finger and heel-to-shin was without dysmetria.     Gait: pt not want to stand as makes him dizzy      < from: CT Lumbar Spine No Cont (19 @ 18:57) >    EXAM:  CT LUMBAR SPINE                            PROCEDURE DATE:  2019            INTERPRETATION:  CLINICAL INFORMATION:  Difficulty walking after a fall      TECHNIQUE:  Serial axial images were obtained using multi slice helical   technique. Sagittal and coronal reformatted images were performed.    COMPARISON:  None.      FINDINGS:      Dextrocurvature of the lumbar spine. There is severe osteopenia. Mild   retrolisthesis of L1 on L2, L2 on L3, and L5 on S1.    The vertebral body heights are maintained.  Linear lucency through the   right L5 pars interarticularis with sclerotic borders, likely   representing a chronic defect. No acute fractures are visualized.    Severe intervertebral disc height loss at L2/L3 and L3/L4.    Moderate bilateral facet joint arthrosis from L1-L4 and severe bilateral   facet joint arthrosis from L4-S1. Small disc bulges are seen at L1/L2 and   L3-S1. These findings results in varying degrees of bilateral foraminal   narrowing. No significant spinalcanal narrowing.    There is moderate left psoas muscle atrophy.    Atheromatous disease of the abdominal aorta. Left upper pole renal cyst   measures 1.7 cm.      IMPRESSION:      No acute fracture. Severe osteopenia. Marked dextroscoliosis. Sclerotic   old right pars interarticularis defect.    Multilevel lumbar spondylosis, as above.                JARED DIXON M.D., RADIOLOGY RESIDENT  This document has been electronically signed.  HUEY PENA M.D., ATTENDING RADIOLOGIST  This documenthas been electronically signed. 2019  7:28PM                < end of copied text >    < from: CT Head No Cont (19 @ 18:52) >    EXAM:  CT BRAIN                            PROCEDURE DATE:  2019            INTERPRETATION:  HISTORY: Status post fall, on blood thinners, unable to   walk secondary to weakness.    Technique: CT of the head was performed without intravenous contrast.    Multiple contiguous axial images were acquired from the skullbase to the   vertex without the administration of intravenous contrast.  Coronal and   sagittal reformations were made.    COMPARISON: None    FINDINGS:  No acute intracranial hemorrhage, mass effect or midline shift. No   displaced calvarial fracture or scalp hematoma.    The ventricles and sulci are within normal limits for the patient's age   without evidence of hydrocephalus. Moderate patchy periventricular and   subcortical white matter hypodensities, compatible with chronic   microvascular changes.    Opacification of the left maxillary sinus with adjacent hyperostosis,   compatible with chronic sinusitis. Mild mucosal thickening of the   bilateral ethmoid sinuses. The visualized intraorbital compartments,   remaining paranasal sinuses and mastoid complexes are free of acute   disease. There has been previous bilateral lens replacement surgery.    IMPRESSION:  No acute intracranial hemorrhage, mass effect or midline shift. No   displaced calvarial fracture or scalp hematoma. Age-appropriate   involutional and ischemic gliotic changes.                DEV GOMEZ M.D., RADIOLOGY RESIDENT  This document has been electronically signed.  HUEY PENA M.D., ATTENDING RADIOLOGIST  This document has been electronically signed. 2019  7:17PM                < end of copied text >

## 2019-07-19 NOTE — CONSULT NOTE ADULT - ASSESSMENT
This is a 84y year old Male with the below past medical history who presents with the chief complaint of weakness.     PMHx of CAD  , Afib on eliquis   s/p AICD, HTN, BPH, CKD, chronic anemia, Chronic infected AICD on doxy ppx  p/w generalized weakness.  Patient reports that he woke up at home and was trying to walk to the bathroom when his legs felt extremely weak.  he was able to get to the bathroom and sit down, but then was to weak to get up.   Patient reports that for the past few weeks he has noticed decreased energy, poor appetite and weight loss.   Denies fevers/chills, CP, SOB, abd pain, NVD.     pt says that feels weak all over, not one focal arm or leg, no focal numbness, no changes in speech, swallow, vision, bowel or bladder control. No sx neck or low back pain.  In the hospital, says feels dizzy when stand, RN notes positive orthostatics.    exam notable for decreased muscle bulk, slight prox leg weakness, dec sensation in legs and overall cachetic state  ct scans noted above are negative  no mris as PPM  Doubt acute neurological issues, has positive orthostatics, generalized weakness from medical issues and disuse atropy  would not recommend further inpt neurological workup  orthostatic treatment of bp as per cardiology  d.w pt and RN
pt with hx of chronic a.fib, htn, chf /HFPEF with normal ef and RV dysfunction with generalized weakness and sob.  r/o sepsis check blood cultures/esr/crp  continue cardiac meds  continue ac  needs diuretics /? r sided cath   spop/upep  check orthostatic

## 2019-07-19 NOTE — DISCHARGE NOTE PROVIDER - PROVIDER TOKENS
PROVIDER:[TOKEN:[[id number]]] PROVIDER:[TOKEN:[[id number]]],FREE:[LAST:[Rizwana Madrid],PHONE:[(   )    -],FAX:[(   )    -],ADDRESS:[Primary Care Provider]]

## 2019-07-19 NOTE — DISCHARGE NOTE PROVIDER - HOSPITAL COURSE
This is a 84y year old Male with the below past medical history who presents with the chief complaint of weakness.     PMHx of CAD  , Afib on eliquis   s/p AICD, HTN, BPH, CKD, chronic anemia, Chronic infected AICD on doxy ppx  p/w generalized weakness. seen by PT and advised rehab;    seen by neurology- no neuro intervention; This is a 84y year old Male with the below past medical history who presents with the chief complaint of weakness.    PMHx of CAD, Afib on Eliquis s/p AICD, HTN, BPH, CKD, chronic anemia, Chronic infected AICD on doxy ppx  p/w generalized weakness. seen by PT and advised rehab;    seen by neurology- no neuro intervention;

## 2019-07-19 NOTE — DISCHARGE NOTE PROVIDER - CARE PROVIDER_API CALL
Davy Graves (DO)  Nephrology  [address] [facility] [address] [facility], [address]  [place], [place] [id number]  Phone: [phone]  Fax: [phone]  Follow Up Time: Davy Graves (DO)  Nephrology  [address] [facility], [address]  [place], [place] [id number]  Phone: [phone]  Fax: [phone]  Follow Up Time:     Rizwana Madrid,   Primary Care Provider  Phone: (   )    -  Fax: (   )    -  Follow Up Time:

## 2019-07-19 NOTE — DISCHARGE NOTE PROVIDER - NSDCCPCAREPLAN_GEN_ALL_CORE_FT
PRINCIPAL DISCHARGE DIAGNOSIS  Diagnosis: Generalized weakness  Assessment and Plan of Treatment: seen by physical therapy and advised rehab PRINCIPAL DISCHARGE DIAGNOSIS  Diagnosis: Generalized weakness  Assessment and Plan of Treatment: Seen by neulogy: No intervention at this time.   Seen by physical therapy and advised rehab

## 2019-07-19 NOTE — CONSULT NOTE ADULT - SUBJECTIVE AND OBJECTIVE BOX
NEPHROLOGY CONSULTATION    CHIEF COMPLAINT: weak    HPI:  Pt is 85 yo male with PMHx of CAD, Afib on eliquis, s/p AICD, HTN, BPH, CKD 3, chronic anemia, chronic infected AICD on doxy ppx p/w generalized weakness. Patient reported that for the past few weeks he has noticed decreased energy, poor appetite and weight loss. Denies fevers/chills, CP, SOB, abd pain, NVDC.  Asked to f/u for CKD 3. BP low-normal noted.    ROS:  as above    Allergies:  Bactrim (Rash)  cefadroxil (Hives)  Levaquin (Rash)  Lipitor (Rash)    PAST MEDICAL & SURGICAL HISTORY:  Atrial fibrillation  Hypertension  BPH (benign prostatic hyperplasia)  CAD (coronary artery disease)  AICD (automatic cardioverter/defibrillator) present  Cardiac pacemaker    SOCIAL HISTORY:  negative    FAMILY HISTORY:  No pertinent family history in first degree relatives    MEDICATIONS  (STANDING):  allopurinol 100 milliGRAM(s) Oral daily  apixaban 2.5 milliGRAM(s) Oral every 12 hours  aspirin enteric coated 81 milliGRAM(s) Oral daily  carvedilol 3.125 milliGRAM(s) Oral every 12 hours  doxycycline hyclate Capsule 100 milliGRAM(s) Oral every 12 hours  furosemide    Tablet 20 milliGRAM(s) Oral daily  levothyroxine 50 MICROGram(s) Oral daily  pantoprazole    Tablet 40 milliGRAM(s) Oral before breakfast  sodium chloride 0.9%. 1000 milliLiter(s) (75 mL/Hr) IV Continuous <Continuous>  tamsulosin 0.4 milliGRAM(s) Oral at bedtime    Home Medications:  allopurinol 100 mg oral tablet: 1 tab(s) orally once a day (2019 09:21)  Aspirin Enteric Coated 81 mg oral delayed release tablet: 1 tab(s) orally once a day (2019 09:21)  Coreg 25 mg oral tablet: 1 tab(s) orally 2 times a day (2019 09:21)  doxycycline monohydrate 100 mg oral capsule: 1 cap(s) orally every 12 hours (2019 09:21)  Eliquis 2.5 mg oral tablet: 1 tab(s) orally 2 times a day (2019 09:21)  Flomax 0.4 mg oral capsule: 2 cap(s) orally once a day (in the evening) (2019 09:21)  Lanoxin 62.5 mcg (0.0625 mg) oral tablet: 1 tab(s) orally once a day (2019 09:21)  Lasix 20 mg oral tablet: 1 tab(s) orally once a day (2019 09:21)    Vital Signs Last 24 Hrs  T(C): 36.2 (19 @ 04:32), Max: 36.4 (19 @ 12:33)  T(F): 97.1 (19 @ 04:32), Max: 97.5 (19 @ 12:33)  HR: 66 (19 @ 04:32) (63 - 66)  BP: 98/64 (19 @ 04:32) (98/64 - 111/64)  RR: 18 (19 @ 08:20) (17 - 18)  SpO2: 95% (19 @ 08:20) (95% - 100%)    Card S1S2  Lungs b/l air entry  Abd soft, NT, ND  Extr no edema    LABS:                        11.0   7.2   )-----------( 137      ( 2019 19:33 )             32.8         142  |  101  |  25<H>  ----------------------------<  83  3.6   |  30  |  1.47<H>    Ca    8.0<L>      2019 07:30  Mg     1.4         Urinalysis Basic - ( 2019 14:30 )    Color: Yellow / Appearance: Clear / S.009 / pH: x  Gluc: x / Ketone: Negative  / Bili: Negative / Urobili: Negative   Blood: x / Protein: Negative / Nitrite: Negative   Leuk Esterase: Negative / RBC: 1 /hpf / WBC 0 /HPF   Sq Epi: x / Non Sq Epi: 1 /hpf / Bacteria: Negative    Culture - Blood (collected 2019 09:42)  Source: .Blood  Preliminary Report (2019 10:01):    No growth to date.    Culture - Blood (collected 2019 09:42)  Source: .Blood  Preliminary Report (2019 10:01):    No growth to date.    Culture - Urine (collected 2019 17:43)  Source: .Urine  Final Report (2019 16:33):    <10,000 CFU/mL Normal Urogenital Terrie    A/P: NEPHROLOGY CONSULTATION    CHIEF COMPLAINT: weak    HPI:  Pt is 85 yo male with PMHx of CAD, Afib on eliquis, s/p AICD, HTN, BPH, CKD 3, chronic anemia, chronic infected AICD on doxy ppx p/w generalized weakness. Patient reported that for the past few weeks he has noticed decreased energy, poor appetite and weight loss. Denies fevers/chills, CP, SOB, abd pain, NVDC.  Asked to f/u for CKD 3. BP low-normal noted.    ROS:  as above    Allergies:  Bactrim (Rash)  cefadroxil (Hives)  Levaquin (Rash)  Lipitor (Rash)    PAST MEDICAL & SURGICAL HISTORY:  Atrial fibrillation  Hypertension  BPH (benign prostatic hyperplasia)  CAD (coronary artery disease)  AICD (automatic cardioverter/defibrillator) present  Cardiac pacemaker    SOCIAL HISTORY:  negative    FAMILY HISTORY:  No pertinent family history in first degree relatives    MEDICATIONS  (STANDING):  allopurinol 100 milliGRAM(s) Oral daily  apixaban 2.5 milliGRAM(s) Oral every 12 hours  aspirin enteric coated 81 milliGRAM(s) Oral daily  carvedilol 3.125 milliGRAM(s) Oral every 12 hours  doxycycline hyclate Capsule 100 milliGRAM(s) Oral every 12 hours  furosemide    Tablet 20 milliGRAM(s) Oral daily  levothyroxine 50 MICROGram(s) Oral daily  pantoprazole    Tablet 40 milliGRAM(s) Oral before breakfast  sodium chloride 0.9%. 1000 milliLiter(s) (75 mL/Hr) IV Continuous <Continuous>  tamsulosin 0.4 milliGRAM(s) Oral at bedtime    Home Medications:  allopurinol 100 mg oral tablet: 1 tab(s) orally once a day (2019 09:21)  Aspirin Enteric Coated 81 mg oral delayed release tablet: 1 tab(s) orally once a day (2019 09:21)  Coreg 25 mg oral tablet: 1 tab(s) orally 2 times a day (2019 09:21)  doxycycline monohydrate 100 mg oral capsule: 1 cap(s) orally every 12 hours (2019 09:21)  Eliquis 2.5 mg oral tablet: 1 tab(s) orally 2 times a day (2019 09:21)  Flomax 0.4 mg oral capsule: 2 cap(s) orally once a day (in the evening) (2019 09:21)  Lanoxin 62.5 mcg (0.0625 mg) oral tablet: 1 tab(s) orally once a day (2019 09:21)  Lasix 20 mg oral tablet: 1 tab(s) orally once a day (2019 09:21)    Vital Signs Last 24 Hrs  T(C): 36.2 (19 @ 04:32), Max: 36.4 (19 @ 12:33)  T(F): 97.1 (19 @ 04:32), Max: 97.5 (19 @ 12:33)  HR: 66 (19 @ 04:32) (63 - 66)  BP: 98/64 (19 @ 04:32) (98/64 - 111/64)  RR: 18 (19 @ 08:20) (17 - 18)  SpO2: 95% (19 @ 08:20) (95% - 100%)    Card S1S2  Lungs b/l air entry  Abd soft, NT, ND  Extr no edema    LABS:                        11.0   7.2   )-----------( 137      ( 2019 19:33 )             32.8         142  |  101  |  25<H>  ----------------------------<  83  3.6   |  30  |  1.47<H>    Ca    8.0<L>      2019 07:30  Mg     1.4         Urinalysis Basic - ( 2019 14:30 )    Color: Yellow / Appearance: Clear / S.009 / pH: x  Gluc: x / Ketone: Negative  / Bili: Negative / Urobili: Negative   Blood: x / Protein: Negative / Nitrite: Negative   Leuk Esterase: Negative / RBC: 1 /hpf / WBC 0 /HPF   Sq Epi: x / Non Sq Epi: 1 /hpf / Bacteria: Negative    Culture - Blood (collected 2019 09:42)  Source: .Blood  Preliminary Report (2019 10:01):    No growth to date.    Culture - Blood (collected 2019 09:42)  Source: .Blood  Preliminary Report (2019 10:01):    No growth to date.    Culture - Urine (collected 2019 17:43)  Source: .Urine  Final Report (2019 16:33):    <10,000 CFU/mL Normal Urogenital Terrie    A/P:    CKD stable  No need for IVF  Will hold Lasix  BMP in am  PT

## 2019-07-20 LAB
ANION GAP SERPL CALC-SCNC: 11 MMOL/L — SIGNIFICANT CHANGE UP (ref 5–17)
BUN SERPL-MCNC: 31 MG/DL — HIGH (ref 7–23)
CALCIUM SERPL-MCNC: 8.4 MG/DL — SIGNIFICANT CHANGE UP (ref 8.4–10.5)
CHLORIDE SERPL-SCNC: 101 MMOL/L — SIGNIFICANT CHANGE UP (ref 96–108)
CO2 SERPL-SCNC: 29 MMOL/L — SIGNIFICANT CHANGE UP (ref 22–31)
CORTIS AM PEAK SERPL-MCNC: 12.5 UG/DL — SIGNIFICANT CHANGE UP (ref 6–18.4)
CREAT SERPL-MCNC: 1.59 MG/DL — HIGH (ref 0.5–1.3)
GLUCOSE SERPL-MCNC: 84 MG/DL — SIGNIFICANT CHANGE UP (ref 70–99)
POTASSIUM SERPL-MCNC: 4 MMOL/L — SIGNIFICANT CHANGE UP (ref 3.5–5.3)
POTASSIUM SERPL-SCNC: 4 MMOL/L — SIGNIFICANT CHANGE UP (ref 3.5–5.3)
SODIUM SERPL-SCNC: 141 MMOL/L — SIGNIFICANT CHANGE UP (ref 135–145)

## 2019-07-20 RX ORDER — MAGNESIUM SULFATE 500 MG/ML
2 VIAL (ML) INJECTION ONCE
Refills: 0 | Status: DISCONTINUED | OUTPATIENT
Start: 2019-07-20 | End: 2019-07-20

## 2019-07-20 RX ORDER — MAGNESIUM SULFATE 500 MG/ML
2 VIAL (ML) INJECTION ONCE
Refills: 0 | Status: COMPLETED | OUTPATIENT
Start: 2019-07-20 | End: 2019-07-20

## 2019-07-20 RX ORDER — FUROSEMIDE 40 MG
20 TABLET ORAL DAILY
Refills: 0 | Status: DISCONTINUED | OUTPATIENT
Start: 2019-07-20 | End: 2019-07-22

## 2019-07-20 RX ADMIN — Medication 100 MILLIGRAM(S): at 18:29

## 2019-07-20 RX ADMIN — Medication 81 MILLIGRAM(S): at 12:48

## 2019-07-20 RX ADMIN — TAMSULOSIN HYDROCHLORIDE 0.4 MILLIGRAM(S): 0.4 CAPSULE ORAL at 21:28

## 2019-07-20 RX ADMIN — CARVEDILOL PHOSPHATE 12.5 MILLIGRAM(S): 80 CAPSULE, EXTENDED RELEASE ORAL at 05:23

## 2019-07-20 RX ADMIN — Medication 50 GRAM(S): at 10:35

## 2019-07-20 RX ADMIN — Medication 100 MILLIGRAM(S): at 05:23

## 2019-07-20 RX ADMIN — CARVEDILOL PHOSPHATE 12.5 MILLIGRAM(S): 80 CAPSULE, EXTENDED RELEASE ORAL at 18:29

## 2019-07-20 RX ADMIN — Medication 20 MILLIGRAM(S): at 12:48

## 2019-07-20 RX ADMIN — PANTOPRAZOLE SODIUM 40 MILLIGRAM(S): 20 TABLET, DELAYED RELEASE ORAL at 06:31

## 2019-07-20 RX ADMIN — Medication 50 MICROGRAM(S): at 05:23

## 2019-07-20 RX ADMIN — APIXABAN 2.5 MILLIGRAM(S): 2.5 TABLET, FILM COATED ORAL at 18:29

## 2019-07-20 RX ADMIN — Medication 100 MILLIGRAM(S): at 12:48

## 2019-07-20 RX ADMIN — APIXABAN 2.5 MILLIGRAM(S): 2.5 TABLET, FILM COATED ORAL at 05:22

## 2019-07-20 NOTE — PROGRESS NOTE ADULT - SUBJECTIVE AND OBJECTIVE BOX
no less/ sob    REVIEW OF SYSTEMS:  GEN: no fever,    no chills  RESP: no SOB,   no cough  CVS: no chest pain,   no palpitations  GI: no abdominal pain,   no nausea,   no vomiting,   no constipation,   no diarrhea  : no dysuria,   no frequency  NEURO: no headache,   no dizziness  PSYCH: no depression,   not anxious  Derm : no rash    MEDICATIONS  (STANDING):  allopurinol 100 milliGRAM(s) Oral daily  apixaban 2.5 milliGRAM(s) Oral every 12 hours  aspirin enteric coated 81 milliGRAM(s) Oral daily  carvedilol 12.5 milliGRAM(s) Oral every 12 hours  doxycycline hyclate Capsule 100 milliGRAM(s) Oral every 12 hours  levothyroxine 50 MICROGram(s) Oral daily  magnesium sulfate  IVPB 2 Gram(s) IV Intermittent once  pantoprazole    Tablet 40 milliGRAM(s) Oral before breakfast  sodium chloride 0.9%. 1000 milliLiter(s) (40 mL/Hr) IV Continuous <Continuous>  tamsulosin 0.4 milliGRAM(s) Oral at bedtime    MEDICATIONS  (PRN):      Vital Signs Last 24 Hrs  T(C): 36.4 (20 Jul 2019 05:21), Max: 36.4 (19 Jul 2019 16:16)  T(F): 97.5 (20 Jul 2019 05:21), Max: 97.6 (19 Jul 2019 16:16)  HR: 72 (20 Jul 2019 05:21) (66 - 78)  BP: 121/58 (20 Jul 2019 05:21) (100/46 - 121/58)  BP(mean): --  RR: 18 (20 Jul 2019 05:21) (18 - 18)  SpO2: 100% (20 Jul 2019 05:21) (98% - 100%)  CAPILLARY BLOOD GLUCOSE        I&O's Summary    19 Jul 2019 07:01  -  20 Jul 2019 07:00  --------------------------------------------------------  IN: 1680 mL / OUT: 1000 mL / NET: 680 mL        PHYSICAL EXAM:  HEAD:  Atraumatic, Normocephalic  NECK: Supple, No   JVD  CHEST/LUNG:   no     rales,     no,    rhonchi  HEART: Regular rate and rhythm;         murmur  ABDOMEN: Soft, Nontender, ;   EXTREMITIES:    no    edema  NEUROLOGY:  alert    LABS:    07-20    141  |  101  |  31<H>  ----------------------------<  84  4.0   |  29  |  1.59<H>    Ca    8.4      20 Jul 2019 07:19  Mg     1.4     07-19                      Thyroid Stimulating Hormone, Serum: 4.39 uIU/mL (07-17 @ 18:06)          Consultant(s) Notes Reviewed:      Care Discussed with Consultants/Other Providers:

## 2019-07-20 NOTE — PROGRESS NOTE ADULT - SUBJECTIVE AND OBJECTIVE BOX
Patient is a 84y old  Male who presents with a chief complaint of weakness (19 Jul 2019 14:30)      Patient seen in follow up for CKD 3. Pt resting in bed. Comfortable.     PAST MEDICAL HISTORY:  Atrial fibrillation  Hypertension  BPH (benign prostatic hyperplasia)  CAD (coronary artery disease)    MEDICATIONS  (STANDING):  allopurinol 100 milliGRAM(s) Oral daily  apixaban 2.5 milliGRAM(s) Oral every 12 hours  aspirin enteric coated 81 milliGRAM(s) Oral daily  carvedilol 12.5 milliGRAM(s) Oral every 12 hours  doxycycline hyclate Capsule 100 milliGRAM(s) Oral every 12 hours  levothyroxine 50 MICROGram(s) Oral daily  pantoprazole    Tablet 40 milliGRAM(s) Oral before breakfast  sodium chloride 0.9%. 1000 milliLiter(s) (40 mL/Hr) IV Continuous <Continuous>  tamsulosin 0.4 milliGRAM(s) Oral at bedtime    MEDICATIONS  (PRN):    T(C): 36.4 (07-20-19 @ 05:21), Max: 36.4 (07-19-19 @ 16:16)  HR: 72 (07-20-19 @ 05:21) (66 - 78)  BP: 121/58 (07-20-19 @ 05:21) (98/64 - 121/58)  RR: 18 (07-20-19 @ 05:21) (18 - 18)  SpO2: 100% (07-20-19 @ 05:21) (95% - 100%)  Wt(kg): --  I&O's Detail    19 Jul 2019 07:01  -  20 Jul 2019 07:00  --------------------------------------------------------  IN:    Oral Fluid: 1080 mL    sodium chloride 0.9%.: 600 mL  Total IN: 1680 mL    OUT:    Voided: 1000 mL  Total OUT: 1000 mL    Total NET: 680 mL          PHYSICAL EXAM:  General: NAD  Respiratory: b/l air entry  Cardiovascular: S1 S2  Gastrointestinal: soft  Extremities:  edema      07-20    141  |  101  |  31<H>  ----------------------------<  84  4.0   |  29  |  1.59<H>    Ca    8.4      20 Jul 2019 07:19  Mg     1.4     07-19        Sodium, Serum: 141 (07-20 @ 07:19)  Sodium, Serum: 142 (07-19 @ 07:30)  Sodium, Serum: 141 (07-17 @ 19:33)  Sodium, Serum: 138 (07-17 @ 11:32)    Creatinine, Serum: 1.59 (07-20 @ 07:19)  Creatinine, Serum: 1.47 (07-19 @ 07:30)  Creatinine, Serum: 1.43 (07-17 @ 19:33)  Creatinine, Serum: 1.50 (07-17 @ 11:32)    Potassium, Serum: 4.0 (07-20 @ 07:19)  Potassium, Serum: 3.6 (07-19 @ 07:30)  Potassium, Serum: 3.3 (07-17 @ 19:33)  Potassium, Serum: 3.8 (07-17 @ 11:32)    Hemoglobin: 11.0 (07-17 @ 19:33)  Hemoglobin: 11.1 (07-17 @ 11:32)

## 2019-07-20 NOTE — PROVIDER CONTACT NOTE (OTHER) - SITUATION
Pt orthostatic BP/pulse lyin/71 64 HR, sittin/50 98 HR, pt unable to stand very dizzy/unstable on feet with 69 HR
Pt refused to do orthostatics and refused to take 6am meds, only will do orthostatics/take meds at 8am or late
pt refused BP and HR orthostatic this morning

## 2019-07-20 NOTE — PROGRESS NOTE ADULT - ASSESSMENT
1.	CKD 3  2.	Hypotension  3.	Hypo Mg  4.	A fib    Stable renal indices. Magnesium supps. Encourage PO intake.   Will follow electrolytes and renal function trend. Cardiology follow up.

## 2019-07-20 NOTE — PROGRESS NOTE ADULT - ASSESSMENT
83 yo male with      PMHx     of CAD  / CABG, , Afib on eliquis   s/p AICD, HTN, BPH, CKD, chronic anemia,     ef  of  50,   s/p infected AICD on doxy ppx     p/w generalized weakness.      Patient reports that he woke up this AM and was trying to walk to the bathroom when his legs felt extremely weak  ?  chronic  leg weakness/ sarcopenia   afebrile/     crt  of  1.5/ follow  cretainine  elevated  tsh/ ON  synthroid   ct spine ,  no fx/  severe  osteopenia/ lumbar spondylosis    ct head, no cva   PT  eval/  neuro  eval   noted, no intervention  needs rehab per  PT  orthostatic  now/  fluids  for  18 hrs/  hold  lasix  for today/  per family, pt has not been drinking fluids  for past few  days  spoke with daughter  np here/  pts  card  does not come  here/ in [facility]/   house  ep to see pt/  no aware  stop fluids/  off lasix . monitoe  orthostatics        < from: CT Lumbar Spine No Cont (06.17.19 @ 18:57) >  IMPRESSION:    No acute fracture. Severe osteopenia. Marked dextroscoliosis. Sclerotic   old right pars interarticularis defect.  Multilevel lumbar spondylosis, as above.  < end of copied text >     from: Transthoracic Echocardiogram (02.12.18 @ 20:09) >  onclusions:  1. Calcified trileaflet aortic valve with decreased  opening. Mean transaortic valve gradient equals 10 mm Hg,  aortic valve velocity time integral equals 40 cm,  consistent with mild aortic stenosis.  2. Severely dilated left atrium.  LA volume index = 57  cc/m2.  3. Moderate concentric left ventricular hypertrophy.  4. Mild global left ventricular systolic dysfunction.  Visual estimation 50-55%.  5. Moderate right atrial enlargement.  6. Right ventricular enlargement with decreased right  ventricular systolic function. A device wire is noted in  the right heart.  *** No previous Echo exam.  < end of copied xt > 83 yo male with      PMHx     of CAD  / CABG, , Afib on eliquis   s/p AICD, HTN, BPH, CKD, chronic anemia,     ef  of  50,   s/p infected AICD on doxy ppx     p/w generalized weakness.      Patient reports that he woke up this AM and was trying to walk to the bathroom when his legs felt extremely weak  ?  chronic  leg weakness/ sarcopenia   afebrile/     crt  of  1.5/ follow  cretainine  elevated  tsh/ ON  synthroid   ct spine ,  no fx/  severe  osteopenia/ lumbar spondylosis    ct head, no cva   PT  eval/  neuro  eval   noted, no intervention  needs rehab per  PT  orthostatic  now/  fluids  for  18 hrs/  hold  lasix  for today/  per family, pt has not been drinking fluids  for past few  days  spoke with daughter  np here/  pts  card  does not come  here/ in [place]/   house  ep to see pt/  no aware  stop fluids/  on lasix ./  monitor   orthostatics        < from: CT Lumbar Spine No Cont (06.17.19 @ 18:57) >  IMPRESSION:    No acute fracture. Severe osteopenia. Marked dextroscoliosis. Sclerotic   old right pars interarticularis defect.  Multilevel lumbar spondylosis, as above.  < end of copied text >     from: Transthoracic Echocardiogram (02.12.18 @ 20:09) >  onclusions:  1. Calcified trileaflet aortic valve with decreased  opening. Mean transaortic valve gradient equals 10 mm Hg,  aortic valve velocity time integral equals 40 cm,  consistent with mild aortic stenosis.  2. Severely dilated left atrium.  LA volume index = 57  cc/m2.  3. Moderate concentric left ventricular hypertrophy.  4. Mild global left ventricular systolic dysfunction.  Visual estimation 50-55%.  5. Moderate right atrial enlargement.  6. Right ventricular enlargement with decreased right  ventricular systolic function. A device wire is noted in  the right heart.  *** No previous Echo exam.  < end of copied xt >

## 2019-07-20 NOTE — PROVIDER CONTACT NOTE (OTHER) - REASON
Pt refused to do orthostatics and refused to take 6am meds, only will do orthostatics/take meds at 8am or later
pt refused BP and HR orthostatic this morning
Pt orthostatic BP/pulse lyin/71 64 HR, sittin/50 98 HR, pt unable to stand very dizzy/unstable on feet with 69 HR

## 2019-07-20 NOTE — PROGRESS NOTE ADULT - SUBJECTIVE AND OBJECTIVE BOX
CARDIOLOGY     PROGRESS  NOTE   ________________________________________________    CHIEF COMPLAINT:Patient is a 84y old  Male who presents with a chief complaint of weakness (20 Jul 2019 08:05)  doing well, no complain.  	  REVIEW OF SYSTEMS:  CONSTITUTIONAL: No fever, weight loss, or fatigue  EYES: No eye pain, visual disturbances, or discharge  ENT:  No difficulty hearing, tinnitus, vertigo; No sinus or throat pain  NECK: No pain or stiffness  RESPIRATORY: No cough, wheezing, chills or hemoptysis; No Shortness of Breath  CARDIOVASCULAR: No chest pain, palpitations, passing out, dizziness, or leg swelling  GASTROINTESTINAL: No abdominal or epigastric pain. No nausea, vomiting, or hematemesis; No diarrhea or constipation. No melena or hematochezia.  GENITOURINARY: No dysuria, frequency, hematuria, or incontinence  NEUROLOGICAL: No headaches, memory loss, loss of strength, numbness, or tremors  SKIN: No itching, burning, rashes, or lesions   LYMPH Nodes: No enlarged glands  ENDOCRINE: No heat or cold intolerance; No hair loss  MUSCULOSKELETAL: No joint pain or swelling; No muscle, back, or extremity pain  PSYCHIATRIC: No depression, anxiety, mood swings, or difficulty sleeping  HEME/LYMPH: No easy bruising, or bleeding gums  ALLERGY AND IMMUNOLOGIC: No hives or eczema	    [ ] All others negative	  [ ] Unable to obtain    PHYSICAL EXAM:  T(C): 36.4 (07-20-19 @ 05:21), Max: 36.4 (07-19-19 @ 16:16)  HR: 72 (07-20-19 @ 05:21) (66 - 78)  BP: 121/58 (07-20-19 @ 05:21) (100/46 - 121/58)  RR: 18 (07-20-19 @ 05:21) (18 - 18)  SpO2: 100% (07-20-19 @ 05:21) (98% - 100%)  Wt(kg): --  I&O's Summary    19 Jul 2019 07:01  -  20 Jul 2019 07:00  --------------------------------------------------------  IN: 1680 mL / OUT: 1000 mL / NET: 680 mL        Appearance: Normal	  HEENT:   Normal oral mucosa, PERRL, EOMI	  Lymphatic: No lymphadenopathy  Cardiovascular: Normal S1 S2, No JVD, + murmurs, No edema  Respiratory: Lungs clear to auscultation	  Psychiatry: A & O x 3, Mood & affect appropriate  Gastrointestinal:  Soft, Non-tender, + BS	  Skin: No rashes, No ecchymoses, No cyanosis	  Neurologic: Non-focal  Extremities: Normal range of motion, No clubbing, cyanosis or edema  Vascular: Peripheral pulses palpable 2+ bilaterally    MEDICATIONS  (STANDING):  allopurinol 100 milliGRAM(s) Oral daily  apixaban 2.5 milliGRAM(s) Oral every 12 hours  aspirin enteric coated 81 milliGRAM(s) Oral daily  carvedilol 12.5 milliGRAM(s) Oral every 12 hours  doxycycline hyclate Capsule 100 milliGRAM(s) Oral every 12 hours  levothyroxine 50 MICROGram(s) Oral daily  magnesium sulfate  IVPB 2 Gram(s) IV Intermittent once  pantoprazole    Tablet 40 milliGRAM(s) Oral before breakfast  sodium chloride 0.9%. 1000 milliLiter(s) (40 mL/Hr) IV Continuous <Continuous>  tamsulosin 0.4 milliGRAM(s) Oral at bedtime      TELEMETRY: 	    ECG:  	  RADIOLOGY:  OTHER: 	  	  LABS:	 	    CARDIAC MARKERS:            07-20    141  |  101  |  31<H>  ----------------------------<  84  4.0   |  29  |  1.59<H>    Ca    8.4      20 Jul 2019 07:19  Mg     1.4     07-19      proBNP:   Lipid Profile:   HgA1c:   TSH: Thyroid Stimulating Hormone, Serum: 4.39 uIU/mL (07-17 @ 18:06)    < from: Transthoracic Echocardiogram (02.12.18 @ 20:09) >  1. Calcified trileaflet aortic valve with decreased  opening. Mean transaortic valve gradient equals 10 mm Hg,  aortic valve velocity time integral equals 40 cm,  consistent with mild aortic stenosis.  2. Severely dilated left atrium.  LA volume index = 57  cc/m2.  3. Moderate concentric left ventricular hypertrophy.  4. Mild global left ventricular systolic dysfunction.  Visual estimation 50-55%.  5. Moderate right atrial enlargement.  6. Right ventricular enlargement with decreased right  ventricular systolic function. A device wire is noted in  the right heart.    < end of copied text >        Assessment and plan  ---------------------------  + mildly orthostatic, RV dysfunction  coreg has decrease   PAF on ac with  LAE  dc ivf  add lasix 20 mg daily  am cortisol level  repeat echo  increase ambulation with physical therapy  bl [facility] CARDIOLOGY     PROGRESS  NOTE   ________________________________________________    CHIEF COMPLAINT:Patient is a 84y old  Male who presents with a chief complaint of weakness (20 Jul 2019 08:05)  doing well, no complain.  	  REVIEW OF SYSTEMS:  CONSTITUTIONAL: No fever, weight loss, or fatigue  EYES: No eye pain, visual disturbances, or discharge  ENT:  No difficulty hearing, tinnitus, vertigo; No sinus or throat pain  NECK: No pain or stiffness  RESPIRATORY: No cough, wheezing, chills or hemoptysis; No Shortness of Breath  CARDIOVASCULAR: No chest pain, palpitations, passing out, dizziness, or leg swelling  GASTROINTESTINAL: No abdominal or epigastric pain. No nausea, vomiting, or hematemesis; No diarrhea or constipation. No melena or hematochezia.  GENITOURINARY: No dysuria, frequency, hematuria, or incontinence  NEUROLOGICAL: No headaches, memory loss, loss of strength, numbness, or tremors  SKIN: No itching, burning, rashes, or lesions   LYMPH Nodes: No enlarged glands  ENDOCRINE: No heat or cold intolerance; No hair loss  MUSCULOSKELETAL: No joint pain or swelling; No muscle, back, or extremity pain  PSYCHIATRIC: No depression, anxiety, mood swings, or difficulty sleeping  HEME/LYMPH: No easy bruising, or bleeding gums  ALLERGY AND IMMUNOLOGIC: No hives or eczema	    [ ] All others negative	  [ ] Unable to obtain    PHYSICAL EXAM:  T(C): 36.4 (07-20-19 @ 05:21), Max: 36.4 (07-19-19 @ 16:16)  HR: 72 (07-20-19 @ 05:21) (66 - 78)  BP: 121/58 (07-20-19 @ 05:21) (100/46 - 121/58)  RR: 18 (07-20-19 @ 05:21) (18 - 18)  SpO2: 100% (07-20-19 @ 05:21) (98% - 100%)  Wt(kg): --  I&O's Summary    19 Jul 2019 07:01  -  20 Jul 2019 07:00  --------------------------------------------------------  IN: 1680 mL / OUT: 1000 mL / NET: 680 mL        Appearance: Normal	  HEENT:   Normal oral mucosa, PERRL, EOMI	  Lymphatic: No lymphadenopathy  Cardiovascular: Normal S1 S2, No JVD, + murmurs, No edema  Respiratory: Lungs clear to auscultation	  Psychiatry: A & O x 3, Mood & affect appropriate  Gastrointestinal:  Soft, Non-tender, + BS	  Skin: No rashes, No ecchymoses, No cyanosis	  Neurologic: Non-focal  Extremities: Normal range of motion, No clubbing, cyanosis or edema  Vascular: Peripheral pulses palpable 2+ bilaterally    MEDICATIONS  (STANDING):  allopurinol 100 milliGRAM(s) Oral daily  apixaban 2.5 milliGRAM(s) Oral every 12 hours  aspirin enteric coated 81 milliGRAM(s) Oral daily  carvedilol 12.5 milliGRAM(s) Oral every 12 hours  doxycycline hyclate Capsule 100 milliGRAM(s) Oral every 12 hours  levothyroxine 50 MICROGram(s) Oral daily  magnesium sulfate  IVPB 2 Gram(s) IV Intermittent once  pantoprazole    Tablet 40 milliGRAM(s) Oral before breakfast  sodium chloride 0.9%. 1000 milliLiter(s) (40 mL/Hr) IV Continuous <Continuous>  tamsulosin 0.4 milliGRAM(s) Oral at bedtime      TELEMETRY: 	    ECG:  	  RADIOLOGY:  OTHER: 	  	  LABS:	 	    CARDIAC MARKERS:            07-20    141  |  101  |  31<H>  ----------------------------<  84  4.0   |  29  |  1.59<H>    Ca    8.4      20 Jul 2019 07:19  Mg     1.4     07-19      proBNP:   Lipid Profile:   HgA1c:   TSH: Thyroid Stimulating Hormone, Serum: 4.39 uIU/mL (07-17 @ 18:06)    < from: Transthoracic Echocardiogram (02.12.18 @ 20:09) >  1. Calcified trileaflet aortic valve with decreased  opening. Mean transaortic valve gradient equals 10 mm Hg,  aortic valve velocity time integral equals 40 cm,  consistent with mild aortic stenosis.  2. Severely dilated left atrium.  LA volume index = 57  cc/m2.  3. Moderate concentric left ventricular hypertrophy.  4. Mild global left ventricular systolic dysfunction.  Visual estimation 50-55%.  5. Moderate right atrial enlargement.  6. Right ventricular enlargement with decreased right  ventricular systolic function. A device wire is noted in  the right heart.    < end of copied text >        Assessment and plan  ---------------------------  + mildly orthostatic, RV dysfunction ?cardiomyopathy s/p Aicd   coreg has decrease   PAF on ac with  LAE  dc ivf  add lasix 20 mg daily  am cortisol level  repeat echo  increase ambulation with physical therapy  bl [facility]

## 2019-07-20 NOTE — PROVIDER CONTACT NOTE (OTHER) - ACTION/TREATMENT ORDERED:
EMIL Zhou made aware, will be endorsed with day team as per PA
Perform a set of orthostatic vitals in am on 7/19, continue plan of care
Okay to reschedule 6am meds till 8am

## 2019-07-20 NOTE — PROVIDER CONTACT NOTE (OTHER) - ASSESSMENT
pt a and ox2, pt denies n/v/d, denies chest pain, denies headache
Pt felt dizzy and was wobbly on feet while standing, once seated had no other s&s of distress
Pt alert and oriented, was agitated that he was woken up, he preferred to do am orthostatics/meds at 8am or later

## 2019-07-21 LAB
ANION GAP SERPL CALC-SCNC: 12 MMOL/L — SIGNIFICANT CHANGE UP (ref 5–17)
BUN SERPL-MCNC: 30 MG/DL — HIGH (ref 7–23)
CALCIUM SERPL-MCNC: 8.2 MG/DL — LOW (ref 8.4–10.5)
CHLORIDE SERPL-SCNC: 102 MMOL/L — SIGNIFICANT CHANGE UP (ref 96–108)
CO2 SERPL-SCNC: 27 MMOL/L — SIGNIFICANT CHANGE UP (ref 22–31)
CREAT SERPL-MCNC: 1.41 MG/DL — HIGH (ref 0.5–1.3)
GLUCOSE SERPL-MCNC: 81 MG/DL — SIGNIFICANT CHANGE UP (ref 70–99)
MAGNESIUM SERPL-MCNC: 2.1 MG/DL — SIGNIFICANT CHANGE UP (ref 1.6–2.6)
POTASSIUM SERPL-MCNC: 3.9 MMOL/L — SIGNIFICANT CHANGE UP (ref 3.5–5.3)
POTASSIUM SERPL-SCNC: 3.9 MMOL/L — SIGNIFICANT CHANGE UP (ref 3.5–5.3)
SODIUM SERPL-SCNC: 141 MMOL/L — SIGNIFICANT CHANGE UP (ref 135–145)

## 2019-07-21 RX ADMIN — Medication 20 MILLIGRAM(S): at 05:24

## 2019-07-21 RX ADMIN — APIXABAN 2.5 MILLIGRAM(S): 2.5 TABLET, FILM COATED ORAL at 17:14

## 2019-07-21 RX ADMIN — Medication 100 MILLIGRAM(S): at 05:24

## 2019-07-21 RX ADMIN — PANTOPRAZOLE SODIUM 40 MILLIGRAM(S): 20 TABLET, DELAYED RELEASE ORAL at 05:24

## 2019-07-21 RX ADMIN — CARVEDILOL PHOSPHATE 12.5 MILLIGRAM(S): 80 CAPSULE, EXTENDED RELEASE ORAL at 05:24

## 2019-07-21 RX ADMIN — APIXABAN 2.5 MILLIGRAM(S): 2.5 TABLET, FILM COATED ORAL at 05:25

## 2019-07-21 RX ADMIN — TAMSULOSIN HYDROCHLORIDE 0.4 MILLIGRAM(S): 0.4 CAPSULE ORAL at 21:29

## 2019-07-21 RX ADMIN — Medication 50 MICROGRAM(S): at 05:25

## 2019-07-21 RX ADMIN — Medication 81 MILLIGRAM(S): at 12:59

## 2019-07-21 RX ADMIN — Medication 100 MILLIGRAM(S): at 17:14

## 2019-07-21 RX ADMIN — Medication 100 MILLIGRAM(S): at 12:59

## 2019-07-21 NOTE — PROGRESS NOTE ADULT - SUBJECTIVE AND OBJECTIVE BOX
CARDIOLOGY     PROGRESS  NOTE   ________________________________________________    CHIEF COMPLAINT:Patient is a 84y old  Male who presents with a chief complaint of weakness (21 Jul 2019 08:23)  doing better.  	  REVIEW OF SYSTEMS:  CONSTITUTIONAL: No fever, weight loss, or fatigue  EYES: No eye pain, visual disturbances, or discharge  ENT:  No difficulty hearing, tinnitus, vertigo; No sinus or throat pain  NECK: No pain or stiffness  RESPIRATORY: No cough, wheezing, chills or hemoptysis; No Shortness of Breath  CARDIOVASCULAR: No chest pain, palpitations, passing out, dizziness, or leg swelling  GASTROINTESTINAL: No abdominal or epigastric pain. No nausea, vomiting, or hematemesis; No diarrhea or constipation. No melena or hematochezia.  GENITOURINARY: No dysuria, frequency, hematuria, or incontinence  NEUROLOGICAL: No headaches, memory loss, loss of strength, numbness, or tremors  SKIN: No itching, burning, rashes, or lesions   LYMPH Nodes: No enlarged glands  ENDOCRINE: No heat or cold intolerance; No hair loss  MUSCULOSKELETAL: No joint pain or swelling; No muscle, back, or extremity pain  PSYCHIATRIC: No depression, anxiety, mood swings, or difficulty sleeping  HEME/LYMPH: No easy bruising, or bleeding gums  ALLERGY AND IMMUNOLOGIC: No hives or eczema	    [ ] All others negative	  [ ] Unable to obtain    PHYSICAL EXAM:  T(C): 36.3 (07-21-19 @ 05:21), Max: 36.4 (07-20-19 @ 14:21)  HR: 63 (07-21-19 @ 05:21) (63 - 72)  BP: 111/57 (07-21-19 @ 05:21) (90/54 - 119/66)  RR: 18 (07-21-19 @ 05:21) (18 - 18)  SpO2: 100% (07-21-19 @ 05:21) (95% - 100%)  Wt(kg): --  I&O's Summary    20 Jul 2019 07:01  -  21 Jul 2019 07:00  --------------------------------------------------------  IN: 1130 mL / OUT: 2000 mL / NET: -870 mL        Appearance: Normal	  HEENT:   Normal oral mucosa, PERRL, EOMI	  Lymphatic: No lymphadenopathy  Cardiovascular: Normal S1 S2, No JVD, + murmurs, No edema  Respiratory: Lungs clear to auscultation	  Psychiatry: A & O x 3, Mood & affect appropriate  Gastrointestinal:  Soft, Non-tender, + BS	  Skin: No rashes, No ecchymoses, No cyanosis	  Neurologic: Non-focal  Extremities: Normal range of motion, No clubbing, cyanosis or edema  Vascular: Peripheral pulses palpable 2+ bilaterally    MEDICATIONS  (STANDING):  allopurinol 100 milliGRAM(s) Oral daily  apixaban 2.5 milliGRAM(s) Oral every 12 hours  aspirin enteric coated 81 milliGRAM(s) Oral daily  carvedilol 12.5 milliGRAM(s) Oral every 12 hours  doxycycline hyclate Capsule 100 milliGRAM(s) Oral every 12 hours  furosemide    Tablet 20 milliGRAM(s) Oral daily  levothyroxine 50 MICROGram(s) Oral daily  pantoprazole    Tablet 40 milliGRAM(s) Oral before breakfast  tamsulosin 0.4 milliGRAM(s) Oral at bedtime      TELEMETRY: 	    ECG:  	  RADIOLOGY:  OTHER: 	  	  LABS:	 	    CARDIAC MARKERS:            07-21    141  |  102  |  30<H>  ----------------------------<  81  3.9   |  27  |  1.41<H>    Ca    8.2<L>      21 Jul 2019 07:19  Mg     2.1     07-21      proBNP:   Lipid Profile:   HgA1c:   TSH: Thyroid Stimulating Hormone, Serum: 4.39 uIU/mL (07-17 @ 18:06)          Assessment and plan  ---------------------------  doing well over all  continue cardiac meds  pt with cardiomyopathy check genetic testing  check orthostatic  [facility]  rehab  continue coreg

## 2019-07-21 NOTE — PROGRESS NOTE ADULT - ASSESSMENT
83 yo male with      PMHx     of CAD  / CABG, , Afib on eliquis   s/p AICD, HTN, BPH, CKD, chronic anemia,     ef  of  51,   on 2/ 2018,    s/p infected AICD on doxy ppx  for life     p/w generalized weakness.      Patient reports that he woke up this AM and was trying to walk to the bathroom when his legs felt extremely weak  ?  chronic  leg weakness/ sarcopenia   afebrile/     crt  of  1.5/ follow  cretainine  elevated  tsh/ ON  synthroid   ct spine ,  no fx/  severe  osteopenia/ lumbar spondylosis/   ct head, no cva     neuro  eval   noted, no intervention,     needs rehab per  PT  orthostatic   resolved/     per family, pt has not been drinking fluids  for past few  days  spoke with daughter  np here/  pts  card  does not come  here/ in [place]/     on lasix ./  monitor   orthostatics  echo/  spoke  to daughter        < from: CT Lumbar Spine No Cont (06.17.19 @ 18:57) >  IMPRESSION:    No acute fracture. Severe osteopenia. Marked dextroscoliosis. Sclerotic   old right pars interarticularis defect.  Multilevel lumbar spondylosis, as above.  < end of copied text >     from: Transthoracic Echocardiogram (02.12.18 @ 20:09) >  onclusions:  1. Calcified trileaflet aortic valve with decreased  opening. Mean transaortic valve gradient equals 10 mm Hg,  aortic valve velocity time integral equals 40 cm,  consistent with mild aortic stenosis.  2. Severely dilated left atrium.  LA volume index = 57  cc/m2.  3. Moderate concentric left ventricular hypertrophy.  4. Mild global left ventricular systolic dysfunction.  Visual estimation 50-55%.  5. Moderate right atrial enlargement.  6. Right ventricular enlargement with decreased right  ventricular systolic function. A device wire is noted in  the right heart.  *** No previous Echo exam.  < end of copied xt >

## 2019-07-21 NOTE — PROGRESS NOTE ADULT - SUBJECTIVE AND OBJECTIVE BOX
no  cp/sob    REVIEW OF SYSTEMS:  GEN: no fever,    no chills  RESP: no SOB,   no cough  CVS: no chest pain,   no palpitations  GI: no abdominal pain,   no nausea,   no vomiting,   no constipation,   no diarrhea  : no dysuria,   no frequency  NEURO: no headache,   no dizziness  PSYCH: no depression,   not anxious  Derm : no rash    MEDICATIONS  (STANDING):  allopurinol 100 milliGRAM(s) Oral daily  apixaban 2.5 milliGRAM(s) Oral every 12 hours  aspirin enteric coated 81 milliGRAM(s) Oral daily  carvedilol 12.5 milliGRAM(s) Oral every 12 hours  doxycycline hyclate Capsule 100 milliGRAM(s) Oral every 12 hours  furosemide    Tablet 20 milliGRAM(s) Oral daily  levothyroxine 50 MICROGram(s) Oral daily  pantoprazole    Tablet 40 milliGRAM(s) Oral before breakfast  tamsulosin 0.4 milliGRAM(s) Oral at bedtime    MEDICATIONS  (PRN):      Vital Signs Last 24 Hrs  T(C): 36.3 (21 Jul 2019 05:21), Max: 36.4 (20 Jul 2019 14:21)  T(F): 97.4 (21 Jul 2019 05:21), Max: 97.6 (20 Jul 2019 21:25)  HR: 63 (21 Jul 2019 05:21) (63 - 72)  BP: 111/57 (21 Jul 2019 05:21) (90/54 - 119/66)  BP(mean): --  RR: 18 (21 Jul 2019 05:21) (18 - 18)  SpO2: 100% (21 Jul 2019 05:21) (95% - 100%)  CAPILLARY BLOOD GLUCOSE        I&O's Summary    20 Jul 2019 07:01  -  21 Jul 2019 07:00  --------------------------------------------------------  IN: 1130 mL / OUT: 2000 mL / NET: -870 mL        PHYSICAL EXAM:  HEAD:  Atraumatic, Normocephalic  NECK: Supple, No   JVD  CHEST/LUNG:   no     rales,     no,    rhonchi  HEART: Regular rate and rhythm;         murmur  ABDOMEN: Soft, Nontender, ;   EXTREMITIES:     no   edema  NEUROLOGY:  alert    LABS:    07-21    141  |  102  |  30<H>  ----------------------------<  81  3.9   |  27  |  1.41<H>    Ca    8.2<L>      21 Jul 2019 07:19  Mg     2.1     07-21                      Thyroid Stimulating Hormone, Serum: 4.39 uIU/mL (07-17 @ 18:06)          Consultant(s) Notes Reviewed:      Care Discussed with Consultants/Other Providers:

## 2019-07-22 ENCOUNTER — TRANSCRIPTION ENCOUNTER (OUTPATIENT)
Age: 84
End: 2019-07-22

## 2019-07-22 VITALS
DIASTOLIC BLOOD PRESSURE: 47 MMHG | SYSTOLIC BLOOD PRESSURE: 116 MMHG | OXYGEN SATURATION: 99 % | TEMPERATURE: 98 F | RESPIRATION RATE: 18 BRPM | HEART RATE: 62 BPM

## 2019-07-22 PROCEDURE — 86901 BLOOD TYPING SEROLOGIC RH(D): CPT

## 2019-07-22 PROCEDURE — 86900 BLOOD TYPING SEROLOGIC ABO: CPT

## 2019-07-22 PROCEDURE — 82330 ASSAY OF CALCIUM: CPT

## 2019-07-22 PROCEDURE — 85027 COMPLETE CBC AUTOMATED: CPT

## 2019-07-22 PROCEDURE — 86140 C-REACTIVE PROTEIN: CPT

## 2019-07-22 PROCEDURE — 83735 ASSAY OF MAGNESIUM: CPT

## 2019-07-22 PROCEDURE — 85652 RBC SED RATE AUTOMATED: CPT

## 2019-07-22 PROCEDURE — 71045 X-RAY EXAM CHEST 1 VIEW: CPT

## 2019-07-22 PROCEDURE — 85610 PROTHROMBIN TIME: CPT

## 2019-07-22 PROCEDURE — 85014 HEMATOCRIT: CPT

## 2019-07-22 PROCEDURE — 86850 RBC ANTIBODY SCREEN: CPT

## 2019-07-22 PROCEDURE — 82947 ASSAY GLUCOSE BLOOD QUANT: CPT

## 2019-07-22 PROCEDURE — 85730 THROMBOPLASTIN TIME PARTIAL: CPT

## 2019-07-22 PROCEDURE — 93005 ELECTROCARDIOGRAM TRACING: CPT

## 2019-07-22 PROCEDURE — 80053 COMPREHEN METABOLIC PANEL: CPT

## 2019-07-22 PROCEDURE — 87086 URINE CULTURE/COLONY COUNT: CPT

## 2019-07-22 PROCEDURE — 80048 BASIC METABOLIC PNL TOTAL CA: CPT

## 2019-07-22 PROCEDURE — 82533 TOTAL CORTISOL: CPT

## 2019-07-22 PROCEDURE — 82803 BLOOD GASES ANY COMBINATION: CPT

## 2019-07-22 PROCEDURE — 83605 ASSAY OF LACTIC ACID: CPT

## 2019-07-22 PROCEDURE — 97162 PT EVAL MOD COMPLEX 30 MIN: CPT

## 2019-07-22 PROCEDURE — 82435 ASSAY OF BLOOD CHLORIDE: CPT

## 2019-07-22 PROCEDURE — 81001 URINALYSIS AUTO W/SCOPE: CPT

## 2019-07-22 PROCEDURE — 84132 ASSAY OF SERUM POTASSIUM: CPT

## 2019-07-22 PROCEDURE — 80162 ASSAY OF DIGOXIN TOTAL: CPT

## 2019-07-22 PROCEDURE — 87040 BLOOD CULTURE FOR BACTERIA: CPT

## 2019-07-22 PROCEDURE — 84443 ASSAY THYROID STIM HORMONE: CPT

## 2019-07-22 PROCEDURE — 99285 EMERGENCY DEPT VISIT HI MDM: CPT

## 2019-07-22 PROCEDURE — 84295 ASSAY OF SERUM SODIUM: CPT

## 2019-07-22 RX ORDER — TAMSULOSIN HYDROCHLORIDE 0.4 MG/1
1 CAPSULE ORAL
Qty: 0 | Refills: 0 | DISCHARGE
Start: 2019-07-22

## 2019-07-22 RX ORDER — DIGOXIN 250 MCG
1 TABLET ORAL
Qty: 0 | Refills: 0 | DISCHARGE

## 2019-07-22 RX ORDER — CARVEDILOL PHOSPHATE 80 MG/1
1 CAPSULE, EXTENDED RELEASE ORAL
Qty: 0 | Refills: 0 | DISCHARGE
Start: 2019-07-22

## 2019-07-22 RX ORDER — TAMSULOSIN HYDROCHLORIDE 0.4 MG/1
2 CAPSULE ORAL
Qty: 0 | Refills: 0 | DISCHARGE

## 2019-07-22 RX ORDER — CARVEDILOL PHOSPHATE 80 MG/1
1 CAPSULE, EXTENDED RELEASE ORAL
Qty: 0 | Refills: 0 | DISCHARGE

## 2019-07-22 RX ADMIN — CARVEDILOL PHOSPHATE 12.5 MILLIGRAM(S): 80 CAPSULE, EXTENDED RELEASE ORAL at 17:44

## 2019-07-22 RX ADMIN — Medication 81 MILLIGRAM(S): at 12:25

## 2019-07-22 RX ADMIN — Medication 100 MILLIGRAM(S): at 17:41

## 2019-07-22 RX ADMIN — APIXABAN 2.5 MILLIGRAM(S): 2.5 TABLET, FILM COATED ORAL at 17:41

## 2019-07-22 RX ADMIN — PANTOPRAZOLE SODIUM 40 MILLIGRAM(S): 20 TABLET, DELAYED RELEASE ORAL at 06:25

## 2019-07-22 RX ADMIN — APIXABAN 2.5 MILLIGRAM(S): 2.5 TABLET, FILM COATED ORAL at 06:24

## 2019-07-22 RX ADMIN — Medication 50 MICROGRAM(S): at 06:24

## 2019-07-22 RX ADMIN — Medication 100 MILLIGRAM(S): at 06:24

## 2019-07-22 RX ADMIN — CARVEDILOL PHOSPHATE 12.5 MILLIGRAM(S): 80 CAPSULE, EXTENDED RELEASE ORAL at 06:24

## 2019-07-22 RX ADMIN — Medication 100 MILLIGRAM(S): at 12:25

## 2019-07-22 RX ADMIN — Medication 20 MILLIGRAM(S): at 06:25

## 2019-07-22 NOTE — PROGRESS NOTE ADULT - SUBJECTIVE AND OBJECTIVE BOX
no cp/sob    REVIEW OF SYSTEMS:  GEN: no fever,    no chills  RESP: no SOB,   no cough  CVS: no chest pain,   no palpitations  GI: no abdominal pain,   no nausea,   no vomiting,   no constipation,   no diarrhea  : no dysuria,   no frequency  NEURO: no headache,   no dizziness  PSYCH: no depression,   not anxious  Derm : no rash    MEDICATIONS  (STANDING):  allopurinol 100 milliGRAM(s) Oral daily  apixaban 2.5 milliGRAM(s) Oral every 12 hours  aspirin enteric coated 81 milliGRAM(s) Oral daily  carvedilol 12.5 milliGRAM(s) Oral every 12 hours  doxycycline hyclate Capsule 100 milliGRAM(s) Oral every 12 hours  furosemide    Tablet 20 milliGRAM(s) Oral daily  levothyroxine 50 MICROGram(s) Oral daily  pantoprazole    Tablet 40 milliGRAM(s) Oral before breakfast  tamsulosin 0.4 milliGRAM(s) Oral at bedtime    MEDICATIONS  (PRN):      Vital Signs Last 24 Hrs  T(C): 36.4 (22 Jul 2019 06:21), Max: 36.6 (21 Jul 2019 20:44)  T(F): 97.5 (22 Jul 2019 06:21), Max: 97.8 (21 Jul 2019 20:44)  HR: 73 (22 Jul 2019 06:21) (58 - 81)  BP: 110/72 (22 Jul 2019 06:21) (90/52 - 110/72)  BP(mean): --  RR: 18 (22 Jul 2019 06:21) (18 - 18)  SpO2: 98% (22 Jul 2019 06:21) (96% - 100%)  CAPILLARY BLOOD GLUCOSE        I&O's Summary    21 Jul 2019 07:01  -  22 Jul 2019 07:00  --------------------------------------------------------  IN: 600 mL / OUT: 200 mL / NET: 400 mL        PHYSICAL EXAM:  HEAD:  Atraumatic, Normocephalic  NECK: Supple, No   JVD  CHEST/LUNG:   no     rales,     no,    rhonchi  HEART: Regular rate and rhythm;         murmur  ABDOMEN: Soft, Nontender, ;   EXTREMITIES:    no    edema  NEUROLOGY:  alert    LABS:    07-21    141  |  102  |  30<H>  ----------------------------<  81  3.9   |  27  |  1.41<H>    Ca    8.2<L>      21 Jul 2019 07:19  Mg     2.1     07-21                      Thyroid Stimulating Hormone, Serum: 4.39 uIU/mL (07-17 @ 18:06)          Consultant(s) Notes Reviewed:      Care Discussed with Consultants/Other Providers:

## 2019-07-22 NOTE — PROGRESS NOTE ADULT - ASSESSMENT
83 yo male with      PMHx     of CAD  / CABG, , Afib on eliquis   s/p AICD, HTN, BPH, CKD, chronic anemia,     ef  of  51,   on 2/ 2018,    s/p infected AICD on doxy ppx  for life     p/w generalized weakness.      Patient reports that he woke up this AM and was trying to walk to the bathroom when his legs felt extremely weak  ?  chronic  leg weakness/ sarcopenia   afebrile/     crt  of  1.5/ follow  cretainine  elevated  tsh/ ON  synthroid   ct spine ,  no fx/  severe  osteopenia/ lumbar spondylosis/   ct head, no cva     neuro  eval   noted, no intervention,     needs rehab per  PT  orthostatic   resolved/     per family, pt has not been drinking fluids  for past few  days  spoke with daughter  np here/  pts  card  does not come  here/ in [place]/     on lasix ./  monitor   orthostatics    spoke  to daughter  needs rehab/  lcleraed  for d/c  by card        < from: CT Lumbar Spine No Cont (06.17.19 @ 18:57) >  IMPRESSION:    No acute fracture. Severe osteopenia. Marked dextroscoliosis. Sclerotic   old right pars interarticularis defect.  Multilevel lumbar spondylosis, as above.  < end of copied text >     from: Transthoracic Echocardiogram (02.12.18 @ 20:09) >  onclusions:  1. Calcified trileaflet aortic valve with decreased  opening. Mean transaortic valve gradient equals 10 mm Hg,  aortic valve velocity time integral equals 40 cm,  consistent with mild aortic stenosis.  2. Severely dilated left atrium.  LA volume index = 57  cc/m2.  3. Moderate concentric left ventricular hypertrophy.  4. Mild global left ventricular systolic dysfunction.  Visual estimation 50-55%.  5. Moderate right atrial enlargement.  6. Right ventricular enlargement with decreased right  ventricular systolic function. A device wire is noted in  the right heart.  *** No previous Echo exam.  < end of copied xt >

## 2019-07-22 NOTE — PROGRESS NOTE ADULT - SUBJECTIVE AND OBJECTIVE BOX
No complaints    Vital Signs Last 24 Hrs  T(C): 36.4 (07-22-19 @ 13:57), Max: 36.6 (07-21-19 @ 20:44)  T(F): 97.5 (07-22-19 @ 13:57), Max: 97.8 (07-21-19 @ 20:44)  HR: 74 (07-22-19 @ 13:57) (65 - 81)  BP: 91/61 (07-22-19 @ 13:57) (91/61 - 110/72)  RR: 18 (07-22-19 @ 13:57) (18 - 18)  SpO2: 96% (07-22-19 @ 13:57) (96% - 98%)    Card S1S2  Lungs b/l air entry  Abd soft, NT, ND  Extr no edema    21 Jul 2019 07:19    141    |  102    |  30     ----------------------------<  81     3.9     |  27     |  1.41     Ca    8.2        21 Jul 2019 07:19  Mg     2.1       21 Jul 2019 07:19    allopurinol 100 milliGRAM(s) Oral daily  apixaban 2.5 milliGRAM(s) Oral every 12 hours  aspirin enteric coated 81 milliGRAM(s) Oral daily  carvedilol 12.5 milliGRAM(s) Oral every 12 hours  doxycycline hyclate Capsule 100 milliGRAM(s) Oral every 12 hours  furosemide    Tablet 20 milliGRAM(s) Oral daily  levothyroxine 50 MICROGram(s) Oral daily  pantoprazole    Tablet 40 milliGRAM(s) Oral before breakfast  tamsulosin 0.4 milliGRAM(s) Oral at bedtime    A/P:    CKD 3 stable  Continue present meds  Avoid nephrotoxins  F/u BMP  CHILO

## 2019-07-22 NOTE — PROGRESS NOTE ADULT - PROVIDER SPECIALTY LIST ADULT
Cardiology
Internal Medicine
Nephrology
Nephrology
Cardiology

## 2019-07-22 NOTE — PROGRESS NOTE ADULT - SUBJECTIVE AND OBJECTIVE BOX
CARDIOLOGY     PROGRESS  NOTE   ________________________________________________    CHIEF COMPLAINT:Patient is a 84y old  Male who presents with a chief complaint of weakness (21 Jul 2019 09:00)  doing better.  	  REVIEW OF SYSTEMS:  CONSTITUTIONAL: No fever, weight loss, or fatigue  EYES: No eye pain, visual disturbances, or discharge  ENT:  No difficulty hearing, tinnitus, vertigo; No sinus or throat pain  NECK: No pain or stiffness  RESPIRATORY: No cough, wheezing, chills or hemoptysis; No Shortness of Breath  CARDIOVASCULAR: No chest pain, palpitations, passing out, dizziness, or leg swelling  GASTROINTESTINAL: No abdominal or epigastric pain. No nausea, vomiting, or hematemesis; No diarrhea or constipation. No melena or hematochezia.  GENITOURINARY: No dysuria, frequency, hematuria, or incontinence  NEUROLOGICAL: No headaches, memory loss, loss of strength, numbness, or tremors  SKIN: No itching, burning, rashes, or lesions   LYMPH Nodes: No enlarged glands  ENDOCRINE: No heat or cold intolerance; No hair loss  MUSCULOSKELETAL: No joint pain or swelling; No muscle, back, or extremity pain  PSYCHIATRIC: No depression, anxiety, mood swings, or difficulty sleeping  HEME/LYMPH: No easy bruising, or bleeding gums  ALLERGY AND IMMUNOLOGIC: No hives or eczema	    [ ] All others negative	  [ ] Unable to obtain    PHYSICAL EXAM:  T(C): 36.4 (07-22-19 @ 06:21), Max: 36.6 (07-21-19 @ 20:44)  HR: 73 (07-22-19 @ 06:21) (58 - 81)  BP: 110/72 (07-22-19 @ 06:21) (90/52 - 110/72)  RR: 18 (07-22-19 @ 06:21) (18 - 18)  SpO2: 98% (07-22-19 @ 06:21) (96% - 100%)  Wt(kg): --  I&O's Summary    21 Jul 2019 07:01  -  22 Jul 2019 07:00  --------------------------------------------------------  IN: 600 mL / OUT: 200 mL / NET: 400 mL        Appearance: Normal	  HEENT:   Normal oral mucosa, PERRL, EOMI	  Lymphatic: No lymphadenopathy  Cardiovascular: Normal S1 S2, No JVD, + murmurs, No edema  Respiratory:rhonchi	  Psychiatry: A & O x 3, Mood & affect appropriate  Gastrointestinal:  Soft, Non-tender, + BS	  Skin: No rashes, No ecchymoses, No cyanosis	  Neurologic: Non-focal  Extremities: Normal range of motion, No clubbing, cyanosis or edema  Vascular: Peripheral pulses palpable 2+ bilaterally    MEDICATIONS  (STANDING):  allopurinol 100 milliGRAM(s) Oral daily  apixaban 2.5 milliGRAM(s) Oral every 12 hours  aspirin enteric coated 81 milliGRAM(s) Oral daily  carvedilol 12.5 milliGRAM(s) Oral every 12 hours  doxycycline hyclate Capsule 100 milliGRAM(s) Oral every 12 hours  furosemide    Tablet 20 milliGRAM(s) Oral daily  levothyroxine 50 MICROGram(s) Oral daily  pantoprazole    Tablet 40 milliGRAM(s) Oral before breakfast  tamsulosin 0.4 milliGRAM(s) Oral at bedtime      TELEMETRY: 	    ECG:  	  RADIOLOGY:  OTHER: 	  	  LABS:	 	    CARDIAC MARKERS:            07-21    141  |  102  |  30<H>  ----------------------------<  81  3.9   |  27  |  1.41<H>    Ca    8.2<L>      21 Jul 2019 07:19  Mg     2.1     07-21      proBNP:   Lipid Profile:   HgA1c:   TSH: Thyroid Stimulating Hormone, Serum: 4.39 uIU/mL (07-17 @ 18:06)          Assessment and plan  ---------------------------  cardiomyopathy ?etiolaz  s/p katiuskad  orthostatic improving  continue coreg  dvt prophylaxis  etsh normal  rehab/physical therapy

## 2019-07-22 NOTE — DISCHARGE NOTE NURSING/CASE MANAGEMENT/SOCIAL WORK - NSDCDPATPORTLINK_GEN_ALL_CORE
You can access the [facility] Patient Portal, offered by [facility], by registering with the following website: [email or web address]

## 2019-07-23 LAB
CULTURE RESULTS: SIGNIFICANT CHANGE UP
CULTURE RESULTS: SIGNIFICANT CHANGE UP
SPECIMEN SOURCE: SIGNIFICANT CHANGE UP
SPECIMEN SOURCE: SIGNIFICANT CHANGE UP

## 2019-07-26 ENCOUNTER — APPOINTMENT (OUTPATIENT)
Dept: ELECTROPHYSIOLOGY | Facility: HOSPITAL | Age: 84
End: 2019-07-26

## 2019-08-19 ENCOUNTER — APPOINTMENT (OUTPATIENT)
Dept: ELECTROPHYSIOLOGY | Facility: CLINIC | Age: 84
End: 2019-08-19
Payer: COMMERCIAL

## 2019-08-19 PROCEDURE — 93296 REM INTERROG EVL PM/IDS: CPT

## 2019-08-19 PROCEDURE — 93295 DEV INTERROG REMOTE 1/2/MLT: CPT

## 2019-10-25 ENCOUNTER — APPOINTMENT (OUTPATIENT)
Dept: ELECTROPHYSIOLOGY | Facility: CLINIC | Age: 84
End: 2019-10-25
Payer: COMMERCIAL

## 2019-10-25 VITALS — OXYGEN SATURATION: 99 % | SYSTOLIC BLOOD PRESSURE: 138 MMHG | DIASTOLIC BLOOD PRESSURE: 67 MMHG | HEART RATE: 67 BPM

## 2019-10-25 PROCEDURE — 93284 PRGRMG EVAL IMPLANTABLE DFB: CPT

## 2019-11-08 ENCOUNTER — LABORATORY RESULT (OUTPATIENT)
Age: 84
End: 2019-11-08

## 2019-11-08 ENCOUNTER — APPOINTMENT (OUTPATIENT)
Dept: CARDIOLOGY | Facility: CLINIC | Age: 84
End: 2019-11-08
Payer: MEDICARE

## 2019-11-08 VITALS
SYSTOLIC BLOOD PRESSURE: 108 MMHG | DIASTOLIC BLOOD PRESSURE: 72 MMHG | HEIGHT: 68 IN | HEART RATE: 66 BPM | OXYGEN SATURATION: 99 %

## 2019-11-08 PROCEDURE — 99214 OFFICE O/P EST MOD 30 MIN: CPT

## 2019-11-08 PROCEDURE — 96040: CPT

## 2019-11-12 NOTE — FAMILY HISTORY
[FreeTextEntry1] : A four-generation family history was constructed and scanned into [employer]. \par Family history is significant for a brother dec age 6 sudden death, another brother  dec from ETOH use and a sister dec Alzheimer's.\par His father  dec 70s from cirrosis. And mother dec from possible blood clots in her 60s.\par He has 5 children and multiple grandchildren.  2 of his daughters have NICM with hx VT and he has one son hx NICM all diagnosed in their 50s\par His family originates from [place].\par \par No Ashkenazi Orthodox ancestry. Family history was negative for consanguinity No family history of SIDS\par \par

## 2019-11-12 NOTE — PHYSICAL EXAM
[Alert] : alert [In no acute distress] :  in no acute distress [Interested in people and surroundings] : interested in people and surroundings [Well developed] : well developed [Normal] : orientated to person, place, and time [Muscle tone/ strength] : muscle tone/ strength is normal [de-identified] : 5/5 NANETTE and JACKELYN [de-identified] : eld M NAD [de-identified] : regular, grade 2 RENATO, trace LE edema

## 2019-11-12 NOTE — REASON FOR VISIT
[Initial - Scheduled] : [unfilled]  is being seen for  ~M an initial scheduled visit [Spouse] : spouse [Other: _____] : [unfilled] [FreeTextEntry3] : DEVANG LOWRY  is being seen  for an initial consultation at the [facility] at [facility] on 11/08/2019.   Mr. LOWRY was referred for hereditary cardiac predisposition risk assessment and counseling, due to his familial cardiomyopathy.

## 2019-11-12 NOTE — HISTORY OF PRESENT ILLNESS
[FreeTextEntry1] : Marino Koehler is an 83 yo M PMH NICM, Hx VT, s/p ICD.  He (his wife) states he was initially diagnosed 3/1990 with NICM at the age of 55 when he presented with decompensated HF,  he denied any prior symptoms and had been health and an active member of the [employer].

## 2019-11-12 NOTE — REVIEW OF SYSTEMS
[Nl] : Neurological [Wgt Loss (___ lbs)] : no recent weight loss [Birth Marks] : no birth marks [Rash] : no rash [Subluxation] : no subluxation was seen [Muscle Weakness] : no muscle weakness [Eczema] : no eczema [Joint Dislocation] : no dislocation [Edema] : no ~T edema [Cyanosis] : no cyanosis [Diaphoresis] : no diaphoresis [Chest Pain] : no chest pain [Exercise Intolerance] : no exercise intolerance [Change in Vision] : no change in vision [Palpitations] : no palpitations [Tachypnea] : no tachypnea [Abnormal Movements] : no abnormal movements [Shortness of Breath] : no shortness of breath [Vomiting] : no vomiting  [Change in Appetite] : no change in appetite [Constipation] : no constipation [Diarrhea] : no diarrhea [Change In Personality] : ~T no personality change

## 2020-01-24 ENCOUNTER — APPOINTMENT (OUTPATIENT)
Dept: ELECTROPHYSIOLOGY | Facility: CLINIC | Age: 85
End: 2020-01-24
Payer: MEDICARE

## 2020-01-24 PROCEDURE — 93295 DEV INTERROG REMOTE 1/2/MLT: CPT

## 2020-01-24 PROCEDURE — 93296 REM INTERROG EVL PM/IDS: CPT

## 2020-02-28 ENCOUNTER — APPOINTMENT (OUTPATIENT)
Dept: ELECTROPHYSIOLOGY | Facility: CLINIC | Age: 85
End: 2020-02-28
Payer: COMMERCIAL

## 2020-02-28 PROCEDURE — 93284 PRGRMG EVAL IMPLANTABLE DFB: CPT

## 2020-03-16 ENCOUNTER — APPOINTMENT (OUTPATIENT)
Dept: ELECTROPHYSIOLOGY | Facility: CLINIC | Age: 85
End: 2020-03-16

## 2020-03-23 ENCOUNTER — APPOINTMENT (OUTPATIENT)
Dept: CARDIOLOGY | Facility: CLINIC | Age: 85
End: 2020-03-23

## 2020-04-28 ENCOUNTER — APPOINTMENT (OUTPATIENT)
Dept: ELECTROPHYSIOLOGY | Facility: CLINIC | Age: 85
End: 2020-04-28

## 2020-05-28 ENCOUNTER — APPOINTMENT (OUTPATIENT)
Dept: ELECTROPHYSIOLOGY | Facility: CLINIC | Age: 85
End: 2020-05-28
Payer: MEDICARE

## 2020-05-28 PROCEDURE — 93296 REM INTERROG EVL PM/IDS: CPT

## 2020-05-28 PROCEDURE — 93295 DEV INTERROG REMOTE 1/2/MLT: CPT

## 2020-06-30 ENCOUNTER — APPOINTMENT (OUTPATIENT)
Dept: ELECTROPHYSIOLOGY | Facility: CLINIC | Age: 85
End: 2020-06-30
Payer: COMMERCIAL

## 2020-06-30 VITALS
SYSTOLIC BLOOD PRESSURE: 129 MMHG | WEIGHT: 120 LBS | HEIGHT: 68 IN | OXYGEN SATURATION: 100 % | HEART RATE: 65 BPM | BODY MASS INDEX: 18.19 KG/M2 | DIASTOLIC BLOOD PRESSURE: 75 MMHG

## 2020-06-30 PROCEDURE — 93284 PRGRMG EVAL IMPLANTABLE DFB: CPT

## 2020-08-28 ENCOUNTER — APPOINTMENT (OUTPATIENT)
Dept: ELECTROPHYSIOLOGY | Facility: CLINIC | Age: 85
End: 2020-08-28
Payer: MEDICARE

## 2020-08-28 PROCEDURE — 93295 DEV INTERROG REMOTE 1/2/MLT: CPT

## 2020-08-28 PROCEDURE — 93296 REM INTERROG EVL PM/IDS: CPT

## 2020-09-01 ENCOUNTER — NON-APPOINTMENT (OUTPATIENT)
Age: 85
End: 2020-09-01

## 2020-09-01 ENCOUNTER — APPOINTMENT (OUTPATIENT)
Dept: ELECTROPHYSIOLOGY | Facility: CLINIC | Age: 85
End: 2020-09-01
Payer: COMMERCIAL

## 2020-09-01 VITALS
HEIGHT: 68 IN | HEART RATE: 67 BPM | SYSTOLIC BLOOD PRESSURE: 136 MMHG | OXYGEN SATURATION: 99 % | DIASTOLIC BLOOD PRESSURE: 61 MMHG

## 2020-09-01 DIAGNOSIS — I48.91 UNSPECIFIED ATRIAL FIBRILLATION: ICD-10-CM

## 2020-09-01 DIAGNOSIS — I47.2 VENTRICULAR TACHYCARDIA: ICD-10-CM

## 2020-09-01 DIAGNOSIS — I42.9 CARDIOMYOPATHY, UNSPECIFIED: ICD-10-CM

## 2020-09-01 PROCEDURE — 99215 OFFICE O/P EST HI 40 MIN: CPT

## 2020-09-01 PROCEDURE — 93000 ELECTROCARDIOGRAM COMPLETE: CPT | Mod: 59

## 2020-09-01 PROCEDURE — 93284 PRGRMG EVAL IMPLANTABLE DFB: CPT

## 2020-09-01 RX ORDER — DONEPEZIL HYDROCHLORIDE 5 MG/1
5 TABLET, FILM COATED ORAL TWICE DAILY
Refills: 0 | Status: ACTIVE | COMMUNITY

## 2020-09-01 RX ORDER — SERTRALINE 25 MG/1
25 TABLET, FILM COATED ORAL DAILY
Refills: 0 | Status: ACTIVE | COMMUNITY

## 2020-09-01 RX ORDER — MEMANTINE HYDROCHLORIDE 10 MG/1
10 TABLET ORAL TWICE DAILY
Refills: 0 | Status: ACTIVE | COMMUNITY

## 2020-09-01 NOTE — DISCUSSION/SUMMARY
[FreeTextEntry1] : Mr. Koehler is a very pleasant 85 year old man s/p secondary prevention ICD for ventricular arrhythmia in the setting of his familial cardiomyopathy.  While given his overall frailty device ICD therapy seems aggressive, he is PM dependent and approaching TEA.  We discussed possible downgrade, but with a quadrapolar LV lead and IS4 RV lead this would require implantation of a new lead.  We discussed plan for ICD generator change which we will schedule.  He is very frail and we will hold his Eliquis despite elevated [id number] (no history of CVA and low burden of AF).  We also discussed plan for ABx given chronic doxycycline.  We will give vanco prior to the procedure and plan for observing overnight with possible repeat dosing.

## 2020-09-01 NOTE — HISTORY OF PRESENT ILLNESS
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  At that time he underwent abdominal ICD implantation.  After sepsis in 2013 he had the abdominal device explanted. The patch was left on.  Subsequently he had cellulitis and a repeat procedure was done. He was left to heal from secondary intention and he is on chronic suppressive antibiotics.  He has a history of paroxysmal atrial fibrillation.  At one point he was on amio and his TSH was > 90.  His ICD is approaching TEA and presents today in anticipation of generator replacement.  He did get ICD shocks initially, but has not been shocked over the past few years.

## 2020-09-01 NOTE — PHYSICAL EXAM
[FreeTextEntry1] : very frail, chronically ill appearing [Heart Rate And Rhythm] : heart rate and rhythm were normal [Heart Sounds] : normal S1 and S2 [Systolic grade ___/6] : A grade [unfilled]/6 systolic murmur was heard. [Respiration, Rhythm And Depth] : normal respiratory rhythm and effort [Exaggerated Use Of Accessory Muscles For Inspiration] : no accessory muscle use [Auscultation Breath Sounds / Voice Sounds] : lungs were clear to auscultation bilaterally [Right Infraclavicular] : right infraclavicular area [Clean] : clean [Dry] : dry [Well-Healed] : well-healed [Abdomen Soft] : soft [Abdomen Tenderness] : non-tender [Abdomen Mass (___ Cm)] : no abdominal mass palpated [Nail Clubbing] : no clubbing of the fingernails [Cyanosis, Localized] : no localized cyanosis [Petechial Hemorrhages (___cm)] : no petechial hemorrhages [] : no ischemic changes yes...

## 2020-09-01 NOTE — REVIEW OF SYSTEMS
[Feeling Fatigued] : feeling fatigued [Eyeglasses] : currently wearing eyeglasses [Shortness Of Breath] : shortness of breath [Dyspnea on exertion] : dyspnea during exertion [Lower Ext Edema] : lower extremity edema [Joint Pain] : joint pain [Negative] : Endocrine [Chest Pain] : no chest pain [Palpitations] : no palpitations [Abdominal Pain] : no abdominal pain [Urinary Frequency] : no change in urinary frequency [Skin: A Rash] : no rash: [Dizziness] : no dizziness [Confusion] : no confusion was observed [Easy Bruising] : a tendency for easy bruising

## 2020-09-01 NOTE — PROCEDURE
[CRT-D] : Cardiac resynchronization therapy defibrillator [Longevity: ___ months] : The estimated remaining battery life is [unfilled] months [Threshold Testing Performed] : Threshold testing was performed [Sensing Amplitude ___mv] : sensing amplitude was [unfilled] mv [Lead Imp:  ___ohms] : lead impedance was [unfilled] ohms [___V @] : [unfilled] V [___ ms] : [unfilled] ms [de-identified] : [employer] [de-identified] : Quadra Geoff [de-identified] : [id number] [de-identified] : 12/26/2013 [de-identified] : 93% BIV paced.  AF burden is 1.5%.

## 2020-09-27 DIAGNOSIS — Z01.818 ENCOUNTER FOR OTHER PREPROCEDURAL EXAMINATION: ICD-10-CM

## 2020-09-28 ENCOUNTER — APPOINTMENT (OUTPATIENT)
Dept: DISASTER EMERGENCY | Facility: CLINIC | Age: 85
End: 2020-09-28

## 2020-09-29 LAB — SARS-COV-2 N GENE NPH QL NAA+PROBE: NOT DETECTED

## 2020-09-30 ENCOUNTER — APPOINTMENT (OUTPATIENT)
Dept: ELECTROPHYSIOLOGY | Facility: CLINIC | Age: 85
End: 2020-09-30

## 2020-10-01 ENCOUNTER — INPATIENT (INPATIENT)
Facility: HOSPITAL | Age: 85
LOS: 0 days | Discharge: HOME | DRG: 245 | End: 2020-10-02
Attending: INTERNAL MEDICINE | Admitting: INTERNAL MEDICINE
Payer: COMMERCIAL

## 2020-10-01 ENCOUNTER — TRANSCRIPTION ENCOUNTER (OUTPATIENT)
Age: 85
End: 2020-10-01

## 2020-10-01 VITALS
SYSTOLIC BLOOD PRESSURE: 99 MMHG | OXYGEN SATURATION: 100 % | RESPIRATION RATE: 17 BRPM | DIASTOLIC BLOOD PRESSURE: 60 MMHG | HEART RATE: 68 BPM | TEMPERATURE: 99 F

## 2020-10-01 DIAGNOSIS — Z45.010 ENCOUNTER FOR CHECKING AND TESTING OF CARDIAC PACEMAKER PULSE GENERATOR [BATTERY]: ICD-10-CM

## 2020-10-01 DIAGNOSIS — Z95.0 PRESENCE OF CARDIAC PACEMAKER: Chronic | ICD-10-CM

## 2020-10-01 DIAGNOSIS — Z95.810 PRESENCE OF AUTOMATIC (IMPLANTABLE) CARDIAC DEFIBRILLATOR: Chronic | ICD-10-CM

## 2020-10-01 LAB
ALBUMIN SERPL ELPH-MCNC: 2.9 G/DL — LOW (ref 3.3–5)
ALP SERPL-CCNC: 69 U/L — SIGNIFICANT CHANGE UP (ref 40–120)
ALT FLD-CCNC: 10 U/L — SIGNIFICANT CHANGE UP (ref 10–45)
ANION GAP SERPL CALC-SCNC: 10 MMOL/L — SIGNIFICANT CHANGE UP (ref 5–17)
APTT BLD: 40.7 SEC — HIGH (ref 27.5–35.5)
AST SERPL-CCNC: 22 U/L — SIGNIFICANT CHANGE UP (ref 10–40)
BASOPHILS # BLD AUTO: 0.07 K/UL — SIGNIFICANT CHANGE UP (ref 0–0.2)
BASOPHILS NFR BLD AUTO: 1 % — SIGNIFICANT CHANGE UP (ref 0–2)
BILIRUB SERPL-MCNC: 0.4 MG/DL — SIGNIFICANT CHANGE UP (ref 0.2–1.2)
BLD GP AB SCN SERPL QL: NEGATIVE — SIGNIFICANT CHANGE UP
BUN SERPL-MCNC: 33 MG/DL — HIGH (ref 7–23)
CALCIUM SERPL-MCNC: 8.9 MG/DL — SIGNIFICANT CHANGE UP (ref 8.4–10.5)
CHLORIDE SERPL-SCNC: 105 MMOL/L — SIGNIFICANT CHANGE UP (ref 96–108)
CO2 SERPL-SCNC: 25 MMOL/L — SIGNIFICANT CHANGE UP (ref 22–31)
CREAT SERPL-MCNC: 1.34 MG/DL — HIGH (ref 0.5–1.3)
EOSINOPHIL # BLD AUTO: 0.35 K/UL — SIGNIFICANT CHANGE UP (ref 0–0.5)
EOSINOPHIL NFR BLD AUTO: 4.8 % — SIGNIFICANT CHANGE UP (ref 0–6)
GLUCOSE SERPL-MCNC: 84 MG/DL — SIGNIFICANT CHANGE UP (ref 70–99)
HCT VFR BLD CALC: 31.7 % — LOW (ref 39–50)
HGB BLD-MCNC: 10.1 G/DL — LOW (ref 13–17)
IMM GRANULOCYTES NFR BLD AUTO: 0.3 % — SIGNIFICANT CHANGE UP (ref 0–1.5)
INR BLD: 0.99 RATIO — SIGNIFICANT CHANGE UP (ref 0.88–1.16)
LYMPHOCYTES # BLD AUTO: 1.88 K/UL — SIGNIFICANT CHANGE UP (ref 1–3.3)
LYMPHOCYTES # BLD AUTO: 25.9 % — SIGNIFICANT CHANGE UP (ref 13–44)
MCHC RBC-ENTMCNC: 31 PG — SIGNIFICANT CHANGE UP (ref 27–34)
MCHC RBC-ENTMCNC: 31.9 GM/DL — LOW (ref 32–36)
MCV RBC AUTO: 97.2 FL — SIGNIFICANT CHANGE UP (ref 80–100)
MONOCYTES # BLD AUTO: 0.4 K/UL — SIGNIFICANT CHANGE UP (ref 0–0.9)
MONOCYTES NFR BLD AUTO: 5.5 % — SIGNIFICANT CHANGE UP (ref 2–14)
NEUTROPHILS # BLD AUTO: 4.55 K/UL — SIGNIFICANT CHANGE UP (ref 1.8–7.4)
NEUTROPHILS NFR BLD AUTO: 62.5 % — SIGNIFICANT CHANGE UP (ref 43–77)
NRBC # BLD: 0 /100 WBCS — SIGNIFICANT CHANGE UP (ref 0–0)
PLATELET # BLD AUTO: 128 K/UL — LOW (ref 150–400)
POTASSIUM SERPL-MCNC: 3.7 MMOL/L — SIGNIFICANT CHANGE UP (ref 3.5–5.3)
POTASSIUM SERPL-SCNC: 3.7 MMOL/L — SIGNIFICANT CHANGE UP (ref 3.5–5.3)
PROT SERPL-MCNC: 5.1 G/DL — LOW (ref 6–8.3)
PROTHROM AB SERPL-ACNC: 11.9 SEC — SIGNIFICANT CHANGE UP (ref 10.6–13.6)
RBC # BLD: 3.26 M/UL — LOW (ref 4.2–5.8)
RBC # FLD: 16.2 % — HIGH (ref 10.3–14.5)
RH IG SCN BLD-IMP: POSITIVE — SIGNIFICANT CHANGE UP
RH IG SCN BLD-IMP: POSITIVE — SIGNIFICANT CHANGE UP
SODIUM SERPL-SCNC: 140 MMOL/L — SIGNIFICANT CHANGE UP (ref 135–145)
WBC # BLD: 7.27 K/UL — SIGNIFICANT CHANGE UP (ref 3.8–10.5)
WBC # FLD AUTO: 7.27 K/UL — SIGNIFICANT CHANGE UP (ref 3.8–10.5)

## 2020-10-01 PROCEDURE — 93010 ELECTROCARDIOGRAM REPORT: CPT

## 2020-10-01 PROCEDURE — 33264 RMVL & RPLCMT DFB GEN MLT LD: CPT

## 2020-10-01 RX ORDER — DIGOXIN 250 MCG
0.06 TABLET ORAL DAILY
Refills: 0 | Status: DISCONTINUED | OUTPATIENT
Start: 2020-10-01 | End: 2020-10-02

## 2020-10-01 RX ORDER — LEVOTHYROXINE SODIUM 125 MCG
1 TABLET ORAL
Qty: 8 | Refills: 0

## 2020-10-01 RX ORDER — ACETAMINOPHEN 500 MG
650 TABLET ORAL EVERY 6 HOURS
Refills: 0 | Status: DISCONTINUED | OUTPATIENT
Start: 2020-10-01 | End: 2020-10-02

## 2020-10-01 RX ORDER — APIXABAN 2.5 MG/1
1 TABLET, FILM COATED ORAL
Qty: 0 | Refills: 0 | DISCHARGE

## 2020-10-01 RX ORDER — DONEPEZIL HYDROCHLORIDE 10 MG/1
0 TABLET, FILM COATED ORAL
Qty: 0 | Refills: 0 | DISCHARGE

## 2020-10-01 RX ORDER — DONEPEZIL HYDROCHLORIDE 10 MG/1
5 TABLET, FILM COATED ORAL AT BEDTIME
Refills: 0 | Status: DISCONTINUED | OUTPATIENT
Start: 2020-10-01 | End: 2020-10-02

## 2020-10-01 RX ORDER — LEVOTHYROXINE SODIUM 125 MCG
75 TABLET ORAL DAILY
Refills: 0 | Status: DISCONTINUED | OUTPATIENT
Start: 2020-10-01 | End: 2020-10-02

## 2020-10-01 RX ORDER — CARVEDILOL PHOSPHATE 80 MG/1
12.5 CAPSULE, EXTENDED RELEASE ORAL EVERY 12 HOURS
Refills: 0 | Status: DISCONTINUED | OUTPATIENT
Start: 2020-10-01 | End: 2020-10-02

## 2020-10-01 RX ORDER — DIGOXIN 250 MCG
1 TABLET ORAL
Qty: 0 | Refills: 0 | DISCHARGE

## 2020-10-01 RX ORDER — MEMANTINE HYDROCHLORIDE 10 MG/1
10 TABLET ORAL
Refills: 0 | Status: DISCONTINUED | OUTPATIENT
Start: 2020-10-01 | End: 2020-10-02

## 2020-10-01 RX ORDER — SERTRALINE 25 MG/1
25 TABLET, FILM COATED ORAL DAILY
Refills: 0 | Status: DISCONTINUED | OUTPATIENT
Start: 2020-10-01 | End: 2020-10-02

## 2020-10-01 RX ORDER — ALLOPURINOL 300 MG
100 TABLET ORAL DAILY
Refills: 0 | Status: DISCONTINUED | OUTPATIENT
Start: 2020-10-01 | End: 2020-10-02

## 2020-10-01 RX ORDER — SERTRALINE 25 MG/1
1 TABLET, FILM COATED ORAL
Qty: 0 | Refills: 0 | DISCHARGE

## 2020-10-01 RX ORDER — ASPIRIN/CALCIUM CARB/MAGNESIUM 324 MG
0 TABLET ORAL
Qty: 0 | Refills: 0 | DISCHARGE

## 2020-10-01 RX ORDER — MEMANTINE HYDROCHLORIDE 10 MG/1
1 TABLET ORAL
Qty: 0 | Refills: 0 | DISCHARGE

## 2020-10-01 RX ORDER — ASPIRIN/CALCIUM CARB/MAGNESIUM 324 MG
1 TABLET ORAL
Qty: 0 | Refills: 0 | DISCHARGE

## 2020-10-01 RX ORDER — FUROSEMIDE 40 MG
20 TABLET ORAL DAILY
Refills: 0 | Status: DISCONTINUED | OUTPATIENT
Start: 2020-10-01 | End: 2020-10-02

## 2020-10-01 RX ORDER — PANTOPRAZOLE SODIUM 20 MG/1
1 TABLET, DELAYED RELEASE ORAL
Qty: 30 | Refills: 0

## 2020-10-01 RX ORDER — TAMSULOSIN HYDROCHLORIDE 0.4 MG/1
0.4 CAPSULE ORAL AT BEDTIME
Refills: 0 | Status: DISCONTINUED | OUTPATIENT
Start: 2020-10-01 | End: 2020-10-02

## 2020-10-01 RX ORDER — FUROSEMIDE 40 MG
1 TABLET ORAL
Qty: 0 | Refills: 0 | DISCHARGE

## 2020-10-01 RX ORDER — LEVOTHYROXINE SODIUM 125 MCG
1 TABLET ORAL
Qty: 0 | Refills: 0 | DISCHARGE

## 2020-10-01 RX ORDER — ASPIRIN/CALCIUM CARB/MAGNESIUM 324 MG
81 TABLET ORAL DAILY
Refills: 0 | Status: DISCONTINUED | OUTPATIENT
Start: 2020-10-01 | End: 2020-10-02

## 2020-10-01 RX ORDER — ALLOPURINOL 300 MG
1 TABLET ORAL
Qty: 0 | Refills: 0 | DISCHARGE

## 2020-10-01 RX ORDER — VANCOMYCIN HCL 1 G
750 VIAL (EA) INTRAVENOUS ONCE
Refills: 0 | Status: COMPLETED | OUTPATIENT
Start: 2020-10-01 | End: 2020-10-01

## 2020-10-01 RX ADMIN — DONEPEZIL HYDROCHLORIDE 5 MILLIGRAM(S): 10 TABLET, FILM COATED ORAL at 22:22

## 2020-10-01 RX ADMIN — Medication 250 MILLIGRAM(S): at 16:27

## 2020-10-01 RX ADMIN — SERTRALINE 25 MILLIGRAM(S): 25 TABLET, FILM COATED ORAL at 18:28

## 2020-10-01 RX ADMIN — Medication 100 MILLIGRAM(S): at 18:27

## 2020-10-01 RX ADMIN — TAMSULOSIN HYDROCHLORIDE 0.4 MILLIGRAM(S): 0.4 CAPSULE ORAL at 22:22

## 2020-10-01 RX ADMIN — CARVEDILOL PHOSPHATE 12.5 MILLIGRAM(S): 80 CAPSULE, EXTENDED RELEASE ORAL at 18:27

## 2020-10-01 RX ADMIN — MEMANTINE HYDROCHLORIDE 10 MILLIGRAM(S): 10 TABLET ORAL at 18:27

## 2020-10-01 NOTE — H&P CARDIOLOGY - HISTORY OF PRESENT ILLNESS
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 His ICD is approaching TEA and presents as outpatient today for generator replacement. Has not been shocked last few years. 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 His ICD is approaching TEA and presents as outpatient today for generator replacement. Has not been shocked last few years. 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 His ICD is approaching TEA and presents as outpatient today for generator replacement. Has not been shocked last few years.   Pt lives with wife and son ambulates with cane/ walker. Daughter at bedside- patient is poor historian.   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 His ICD is approaching TEA and presents as outpatient today for generator replacement. Has not been shocked last few years.   Pt lives with wife and son ambulates with cane/ walker. Daughter at bedside- patient is poor historian.

## 2020-10-01 NOTE — DISCHARGE NOTE PROVIDER - NSDCMRMEDTOKEN_GEN_ALL_CORE_FT
acetaminophen 325 mg oral tablet: 2 tab(s) orally every 6 hours, As needed, Mild Pain (1 - 3)  allopurinol 100 mg oral tablet: 1 tab(s) orally once a day  aspirin 81 mg oral tablet: orally once a day  carvedilol 12.5 mg oral tablet: 1 tab(s) orally every 12 hours  digoxin 62.5 mcg (0.0625 mg) oral tablet: 1 tab(s) orally once a day  donepezil 5 mg oral tablet: orally 2 times a day  doxycycline monohydrate 100 mg oral capsule: 1 cap(s) orally every 12 hours  Eliquis 2.5 mg oral tablet: 1 tab(s) orally 2 times a day  Lasix 20 mg oral tablet: 1 tab(s) orally once a day  Namenda 10 mg oral tablet: 1 tab(s) orally 2 times a day  sertraline 25 mg oral tablet: 1 tab(s) orally once a day  Synthroid 75 mcg (0.075 mg) oral tablet: 1 tab(s) orally once a day  tamsulosin 0.4 mg oral capsule: 1 cap(s) orally once a day (at bedtime)

## 2020-10-01 NOTE — PATIENT PROFILE ADULT - FALL HARM RISK
Size Of Lesion In Cm: 0 Dressing: pressure dressing with telfa Electrodesiccation And Curettage Text: The wound bed was treated with electrodesiccation and curettage after the biopsy was performed. Electrodesiccation Text: The wound bed was treated with electrodesiccation after the biopsy was performed. Lab: [id number] Post-Care Instructions: I reviewed with the patient in detail post-care instructions. Patient is to keep the biopsy site dry overnight, and then apply bacitracin twice daily until healed. Patient may apply hydrogen peroxide soaks to remove any crusting. Biopsy Method: Personna blade Render Post-Care Instructions In Note?: yes Notification Instructions: Patient will be notified of biopsy results. However, patient instructed to call the office if not contacted within 2 weeks. Cryotherapy Text: The wound bed was treated with cryotherapy after the biopsy was performed. Consent: Verbal consent was obtained and risks were reviewed including but not limited to scarring, infection, bleeding, scabbing, incomplete removal, nerve damage and allergy to anesthesia. Destruction After The Procedure: No Detail Level: Detailed Silver Nitrate Text: The wound bed was treated with silver nitrate after the biopsy was performed. Biopsy Type: H and E Type Of Destruction Used: Curettage Curettage Text: The wound bed was treated with curettage after the biopsy was performed. Depth Of Biopsy: dermis Billing Type: Third-Party Bill Anesthesia Volume In Cc (Will Not Render If 0): 0.5 Wound Care: Vaseline Lab Facility: [id number] Hemostasis: Drysol Anesthesia Type: 1% lidocaine without epinephrine and a 1:10 solution of 8.4% sodium bicarbonate coagulation(Bleeding disorder R/T clinical cond/anti-coags)

## 2020-10-01 NOTE — DISCHARGE NOTE PROVIDER - NSDCCPTREATMENT_GEN_ALL_CORE_FT
PRINCIPAL PROCEDURE  Procedure: Replacement, pulse generator, permanent cardiac pacemaker, dual chamber, in cardiac catheterization laboratory  Findings and Treatment: [employer] ICD generator change

## 2020-10-01 NOTE — PROGRESS NOTE ADULT - SUBJECTIVE AND OBJECTIVE BOX
Pre-op Diagnosis: TEA    Post-op Diagnosis: same    Procedure: generator replacement    Electrophysiologist: Isaiah    Anesthesia: local    Access: existing    Description: KIERA    Complications: none    EBL: < 10 cc    Disposition: CSSU    Plan: Check vanco level in AM

## 2020-10-01 NOTE — DISCHARGE NOTE PROVIDER - HOSPITAL COURSE
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 His ICD is approaching TEA and presents as outpatient today for generator replacement. Has not been shocked last few years.   Pt lives with wife and son ambulates with cane/ walker. Daughter at bedside- patient is poor historian.    10/1 s/p BiV ICD generator change. Right chest wall site without swelling, bleeding.   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 His ICD is approaching TEA and presents as outpatient today for generator replacement. Has not been shocked last few years.   Pt lives with wife and son ambulates with cane/ walker. Daughter at bedside- patient is poor historian.    10/1 s/p BiV ICD generator change. Right chest wall site without swelling, bleeding.  10/2: stable for discharge home today per EP service

## 2020-10-01 NOTE — DISCHARGE NOTE PROVIDER - CARE PROVIDER_API CALL
Stefan Colon)  Cardiac Electrophysiology; Cardiology  [address] [place] [address]  Phone: [phone]  Fax: [phone]  Scheduled Appointment: 10/13/2020 09:00 AM

## 2020-10-01 NOTE — DISCHARGE NOTE PROVIDER - NSDCPNSUBOBJ_GEN_ALL_CORE
Patient is a 85y old  Male who presents for scheduled ICD generator change        Allergies    Bactrim (Rash)  cefadroxil (Hives)  Levaquin (Rash)  Lipitor (Rash)    Intolerances    amiodarone (Unknown)      Medications:  acetaminophen   Tablet .. 650 milliGRAM(s) Oral every 6 hours PRN  allopurinol 100 milliGRAM(s) Oral daily  aspirin enteric coated 81 milliGRAM(s) Oral daily  carvedilol 12.5 milliGRAM(s) Oral every 12 hours  digoxin     Tablet 0.0625 milliGRAM(s) Oral daily  donepezil 5 milliGRAM(s) Oral at bedtime  furosemide    Tablet 20 milliGRAM(s) Oral daily  levothyroxine 75 MICROGram(s) Oral daily  memantine 10 milliGRAM(s) Oral two times a day  sertraline 25 milliGRAM(s) Oral daily  tamsulosin 0.4 milliGRAM(s) Oral at bedtime      Vitals:  T(C): 37 (10-01-20 @ 18:20), Max: 37.4 (10-01-20 @ 13:03)  HR: 63 (10-01-20 @ 22:20) (63 - 77)  BP: 102/58 (10-01-20 @ 22:20) (91/46 - 132/52)  BP(mean): 73 (10-01-20 @ 13:14) (73 - 73)  RR: 17 (10-01-20 @ 22:20) (17 - 18)  SpO2: 100% (10-01-20 @ 22:20) (94% - 100%)  Wt(kg): --  Daily Height in cm: 170.18 (01 Oct 2020 13:14)    Daily Weight in k.2 (01 Oct 2020 13:14)  I&O's Summary    01 Oct 2020 07:01  -  02 Oct 2020 00:10  --------------------------------------------------------  IN: 0 mL / OUT: 50 mL / NET: -50 mL          Physical Exam:  Appearance: Normal  Eyes: PERRL, EOMI  HENT: Normal oral muscosa, NC/AT  Cardiovascular: S1S2, RRR, No M/R/G, no JVD, No Lower extremity edema  Procedural Access Site: No hematoma, Non-tender to palpation, 2+ pulse, No bruit, No Ecchymosis  Respiratory: Clear to auscultation bilaterally  Gastrointestinal: Soft, Non tender, Normal Bowel Sounds  Musculoskeletal: No clubbing, No joint deformity   Neurologic: Non-focal  Lymphatic: No lymphadenopathy  Psychiatry: AAOx3, Mood & affect appropriate  Skin: No rashes, No ecchymoses, No cyanosis    10-01    140  |  105  |  33<H>  ----------------------------<  84  3.7   |  25  |  1.34<H>    Ca    8.9      01 Oct 2020 13:37    TPro  5.1<L>  /  Alb  2.9<L>  /  TBili  0.4  /  DBili  x   /  AST  22  /  ALT  10  /  AlkPhos  69  10-    PT/INR - ( 01 Oct 2020 13:37 )   PT: 11.9 sec;   INR: 0.99 ratio         PTT - ( 01 Oct 2020 13:37 )  PTT:40.7 sec        Lipid panel   Hgb A1c                         10.1   7.27  )-----------( 128      ( 01 Oct 2020 13:37 )             31.7         ECG: Atrial paced 72 bpm    Electrophysiology: [employer] ICD generator change    Imaging:    Interpretation of Telemetry:

## 2020-10-01 NOTE — H&P CARDIOLOGY - PMH
Atrial fibrillation    BPH (benign prostatic hyperplasia)    CAD (coronary artery disease)    Familial cardiomyopathy    Hypertension    Spinal stenosis, unspecified spinal region

## 2020-10-01 NOTE — DISCHARGE NOTE PROVIDER - NSDCCPCAREPLAN_GEN_ALL_CORE_FT
PRINCIPAL DISCHARGE DIAGNOSIS  Diagnosis: Ischemic dilated cardiomyopathy  Assessment and Plan of Treatment: Continue with follow-up visits to your electrophysiology team and with your home remote device monitoring (if applicable). Continue your medications as prescribed. Monitor your right chest wall site for swelling, bleeding.

## 2020-10-01 NOTE — ASU PATIENT PROFILE, ADULT - VISION (WITH CORRECTIVE LENSES IF THE PATIENT USUALLY WEARS THEM):
Partially impaired: cannot see medication labels or newsprint, but can see obstacles in path, and the surrounding layout; can count fingers at arm's length
No

## 2020-10-01 NOTE — CHART NOTE - NSCHARTNOTEFT_GEN_A_CORE
s/p [employer] generator change   right infra-clavicular site stable open to air  no bleeding / hematoma  Pre procedure pt received Vanco IV will check Vanco level in AM to reassess need for further abx.  Pt in on chronic Doxycycline at home,   Eliquis to resume tomorrow evening if pocket stable.   Followup appointment is scheduled on __________10/13 9AM_. Pt daughter aware.   Will monitor overnight and possible discharge in the AM.   Plan d/w Dr. Colon, patient and daughter.

## 2020-10-01 NOTE — CHART NOTE - NSCHARTNOTEFT_GEN_A_CORE
Post PPM/AICD Generator Change  left anterior chest wall site no hematoma no bleeding   Discharge Instructions including activity & restrictions, site care, ID card and booklet reviewed with pt verbalized good understanding with teachback.  Followup appointment is scheduled on ______10/15 at 1:40 EP for wound check _____________________  Antibiotics post procedure   ________sent to Rx Doxycycline x2 days _________________ as per signout from  ____Isaiah___________________ sent to their Pharmacy.   Pain Management discussed with pt Acetaminophen 650mg [id number] prn for mild (1-3) pain   Pt and site stable after required bedrest and ambulation cleared for discharge home.     Ngoc Welch NP Cardiology

## 2020-10-01 NOTE — DISCHARGE NOTE PROVIDER - NSDCFUSCHEDAPPT_GEN_ALL_CORE_FT
DEVANG LOWRY ; 10/07/2020 ; [facility] Cardio Electro 300 Comm DEVANG Carvalho ; 10/13/2020 ; [facility] Cardio Electro 300 [address] DEVANG Carvalho ; 10/21/2020 ; [facility] Cardio [address]. DEVANG Carvalho ; 11/27/2020 ; [facility] Cardio [facility] 300 [address]  DEVANG LOWRY ; 10/13/2020 ; [facility] Cardio Electro 300 Comm DEVANG Carvalho ; 10/21/2020 ; [facility] Cardio 300 [address]. DEVANG Carvalho ; 11/27/2020 ; [facility] Cardio Electro 300 Comm

## 2020-10-02 ENCOUNTER — TRANSCRIPTION ENCOUNTER (OUTPATIENT)
Age: 85
End: 2020-10-02

## 2020-10-02 VITALS
OXYGEN SATURATION: 96 % | RESPIRATION RATE: 18 BRPM | DIASTOLIC BLOOD PRESSURE: 47 MMHG | TEMPERATURE: 100 F | HEART RATE: 60 BPM | SYSTOLIC BLOOD PRESSURE: 96 MMHG

## 2020-10-02 LAB
ANION GAP SERPL CALC-SCNC: 9 MMOL/L — SIGNIFICANT CHANGE UP (ref 5–17)
BUN SERPL-MCNC: 29 MG/DL — HIGH (ref 7–23)
CALCIUM SERPL-MCNC: 8.7 MG/DL — SIGNIFICANT CHANGE UP (ref 8.4–10.5)
CHLORIDE SERPL-SCNC: 107 MMOL/L — SIGNIFICANT CHANGE UP (ref 96–108)
CO2 SERPL-SCNC: 26 MMOL/L — SIGNIFICANT CHANGE UP (ref 22–31)
CREAT SERPL-MCNC: 1.35 MG/DL — HIGH (ref 0.5–1.3)
DIGOXIN SERPL-MCNC: 0.9 NG/ML — SIGNIFICANT CHANGE UP (ref 0.8–2)
GLUCOSE SERPL-MCNC: 96 MG/DL — SIGNIFICANT CHANGE UP (ref 70–99)
HCT VFR BLD CALC: 28.8 % — LOW (ref 39–50)
HGB BLD-MCNC: 9.6 G/DL — LOW (ref 13–17)
MCHC RBC-ENTMCNC: 31.8 PG — SIGNIFICANT CHANGE UP (ref 27–34)
MCHC RBC-ENTMCNC: 33.3 GM/DL — SIGNIFICANT CHANGE UP (ref 32–36)
MCV RBC AUTO: 95.4 FL — SIGNIFICANT CHANGE UP (ref 80–100)
NRBC # BLD: 0 /100 WBCS — SIGNIFICANT CHANGE UP (ref 0–0)
PLATELET # BLD AUTO: 117 K/UL — LOW (ref 150–400)
POTASSIUM SERPL-MCNC: 4.2 MMOL/L — SIGNIFICANT CHANGE UP (ref 3.5–5.3)
POTASSIUM SERPL-SCNC: 4.2 MMOL/L — SIGNIFICANT CHANGE UP (ref 3.5–5.3)
RBC # BLD: 3.02 M/UL — LOW (ref 4.2–5.8)
RBC # FLD: 16.2 % — HIGH (ref 10.3–14.5)
SODIUM SERPL-SCNC: 142 MMOL/L — SIGNIFICANT CHANGE UP (ref 135–145)
VANCOMYCIN FLD-MCNC: 7 UG/ML — SIGNIFICANT CHANGE UP
WBC # BLD: 9.95 K/UL — SIGNIFICANT CHANGE UP (ref 3.8–10.5)
WBC # FLD AUTO: 9.95 K/UL — SIGNIFICANT CHANGE UP (ref 3.8–10.5)

## 2020-10-02 PROCEDURE — 33264 RMVL & RPLCMT DFB GEN MLT LD: CPT

## 2020-10-02 PROCEDURE — 80053 COMPREHEN METABOLIC PANEL: CPT

## 2020-10-02 PROCEDURE — 99238 HOSP IP/OBS DSCHRG MGMT 30/<: CPT

## 2020-10-02 PROCEDURE — 93005 ELECTROCARDIOGRAM TRACING: CPT

## 2020-10-02 PROCEDURE — 85730 THROMBOPLASTIN TIME PARTIAL: CPT

## 2020-10-02 PROCEDURE — 80048 BASIC METABOLIC PNL TOTAL CA: CPT

## 2020-10-02 PROCEDURE — 80162 ASSAY OF DIGOXIN TOTAL: CPT

## 2020-10-02 PROCEDURE — C1889: CPT

## 2020-10-02 PROCEDURE — 85025 COMPLETE CBC W/AUTO DIFF WBC: CPT

## 2020-10-02 PROCEDURE — 86850 RBC ANTIBODY SCREEN: CPT

## 2020-10-02 PROCEDURE — 86900 BLOOD TYPING SEROLOGIC ABO: CPT

## 2020-10-02 PROCEDURE — 85610 PROTHROMBIN TIME: CPT

## 2020-10-02 PROCEDURE — 86901 BLOOD TYPING SEROLOGIC RH(D): CPT

## 2020-10-02 PROCEDURE — 85027 COMPLETE CBC AUTOMATED: CPT

## 2020-10-02 PROCEDURE — C1882: CPT

## 2020-10-02 PROCEDURE — 93010 ELECTROCARDIOGRAM REPORT: CPT

## 2020-10-02 RX ORDER — VANCOMYCIN HCL 1 G
750 VIAL (EA) INTRAVENOUS ONCE
Refills: 0 | Status: COMPLETED | OUTPATIENT
Start: 2020-10-02 | End: 2020-10-02

## 2020-10-02 RX ORDER — ACETAMINOPHEN 500 MG
2 TABLET ORAL
Qty: 0 | Refills: 0 | DISCHARGE
Start: 2020-10-02

## 2020-10-02 RX ADMIN — Medication 81 MILLIGRAM(S): at 05:45

## 2020-10-02 RX ADMIN — CARVEDILOL PHOSPHATE 12.5 MILLIGRAM(S): 80 CAPSULE, EXTENDED RELEASE ORAL at 05:45

## 2020-10-02 RX ADMIN — MEMANTINE HYDROCHLORIDE 10 MILLIGRAM(S): 10 TABLET ORAL at 05:45

## 2020-10-02 RX ADMIN — Medication 75 MICROGRAM(S): at 05:45

## 2020-10-02 RX ADMIN — Medication 0.06 MILLIGRAM(S): at 05:45

## 2020-10-02 RX ADMIN — Medication 250 MILLIGRAM(S): at 12:06

## 2020-10-02 RX ADMIN — SERTRALINE 25 MILLIGRAM(S): 25 TABLET, FILM COATED ORAL at 12:06

## 2020-10-02 RX ADMIN — Medication 100 MILLIGRAM(S): at 12:06

## 2020-10-02 RX ADMIN — Medication 20 MILLIGRAM(S): at 05:45

## 2020-10-02 NOTE — PROGRESS NOTE ADULT - SUBJECTIVE AND OBJECTIVE BOX
24H hour events  Patient without complaints.    MEDICATIONS:  aspirin enteric coated 81 milliGRAM(s) Oral daily  carvedilol 12.5 milliGRAM(s) Oral every 12 hours  digoxin     Tablet 0.0625 milliGRAM(s) Oral daily  furosemide    Tablet 20 milliGRAM(s) Oral daily  tamsulosin 0.4 milliGRAM(s) Oral at bedtime    acetaminophen   Tablet .. 650 milliGRAM(s) Oral every 6 hours PRN  donepezil 5 milliGRAM(s) Oral at bedtime  memantine 10 milliGRAM(s) Oral two times a day  sertraline 25 milliGRAM(s) Oral daily      allopurinol 100 milliGRAM(s) Oral daily  levothyroxine 75 MICROGram(s) Oral daily        PHYSICAL EXAM:  T(C): 37.7 (10-02-20 @ 07:35), Max: 37.7 (10-02-20 @ 07:35)  HR: 60 (10-02-20 @ 07:35) (60 - 77)  BP: 96/47 (10-02-20 @ 07:35) (91/46 - 132/52)  RR: 18 (10-02-20 @ 07:35) (17 - 18)  SpO2: 96% (10-02-20 @ 07:35) (94% - 100%)  Wt(kg): --  I&O's Summary    01 Oct 2020 07:01  -  02 Oct 2020 07:00  --------------------------------------------------------  IN: 100 mL / OUT: 250 mL / NET: -150 mL    02 Oct 2020 07:01  -  02 Oct 2020 09:28  --------------------------------------------------------  IN: 240 mL / OUT: 301 mL / NET: -61 mL      Appearance: Alert. NAD	  Cardiovascular: +S1S2 RRR , LACW site   Respiratory: CTA B/L	  Psychiatry: A & O x 3, Mood & affect appropriate  Gastrointestinal:  Soft, NT. ND. +BS	  Extremities: No edema BLE        LABS:	 	                          9.6    9.95  )-----------( 117      ( 02 Oct 2020 04:21 )             28.8       10-02    142  |  107  |  29<H>  ----------------------------<  96  4.2   |  26  |  1.35<H>  10-01    140  |  105  |  33<H>  ----------------------------<  84  3.7   |  25  |  1.34<H>    Ca    8.7      02 Oct 2020 04:21  Ca    8.9      01 Oct 2020 13:37    TPro  5.1<L>  /  Alb  2.9<L>  /  TBili  0.4  /  DBili  x   /  AST  22  /  ALT  10  /  AlkPhos  69  10-01    TELEMETRY: V paced, PVC's  	    ECG:  	Vpaced     RADIOLOGY:    	  ASSESSMENT/PLAN: 	      --Reviewed post procedure instructions with patient and ID booklet/instructions given to pt  --Device checked by company rep with normal  function. Remote teaching done  --F/U wound check appointment on 10/13 @ 9am    --EP will sign off. Reconsult prn 24H hour events  Patient without complaints.    MEDICATIONS:  aspirin enteric coated 81 milliGRAM(s) Oral daily  carvedilol 12.5 milliGRAM(s) Oral every 12 hours  digoxin     Tablet 0.0625 milliGRAM(s) Oral daily  furosemide    Tablet 20 milliGRAM(s) Oral daily  tamsulosin 0.4 milliGRAM(s) Oral at bedtime    acetaminophen   Tablet .. 650 milliGRAM(s) Oral every 6 hours PRN  donepezil 5 milliGRAM(s) Oral at bedtime  memantine 10 milliGRAM(s) Oral two times a day  sertraline 25 milliGRAM(s) Oral daily      allopurinol 100 milliGRAM(s) Oral daily  levothyroxine 75 MICROGram(s) Oral daily        PHYSICAL EXAM:  T(C): 37.7 (10-02-20 @ 07:35), Max: 37.7 (10-02-20 @ 07:35)  HR: 60 (10-02-20 @ 07:35) (60 - 77)  BP: 96/47 (10-02-20 @ 07:35) (91/46 - 132/52)  RR: 18 (10-02-20 @ 07:35) (17 - 18)  SpO2: 96% (10-02-20 @ 07:35) (94% - 100%)  Wt(kg): --  I&O's Summary    01 Oct 2020 07:01  -  02 Oct 2020 07:00  --------------------------------------------------------  IN: 100 mL / OUT: 250 mL / NET: -150 mL    02 Oct 2020 07:01  -  02 Oct 2020 09:28  --------------------------------------------------------  IN: 240 mL / OUT: 301 mL / NET: -61 mL      Appearance: Alert. NAD	  Cardiovascular: +S1S2 RRR , RACW site   Respiratory: CTA B/L	  Psychiatry: A & O x 3, Mood & affect appropriate  Gastrointestinal:  Soft, NT. ND. +BS	  Extremities: No edema BLE        LABS:	 	                          9.6    9.95  )-----------( 117      ( 02 Oct 2020 04:21 )             28.8       10-02    142  |  107  |  29<H>  ----------------------------<  96  4.2   |  26  |  1.35<H>  10-01    140  |  105  |  33<H>  ----------------------------<  84  3.7   |  25  |  1.34<H>    Ca    8.7      02 Oct 2020 04:21  Ca    8.9      01 Oct 2020 13:37    TPro  5.1<L>  /  Alb  2.9<L>  /  TBili  0.4  /  DBili  x   /  AST  22  /  ALT  10  /  AlkPhos  69  10-01    TELEMETRY: V paced, PVC's  	    ECG:  	Vpaced     RADIOLOGY:    	  ASSESSMENT/PLAN: 	      --Reviewed post procedure instructions with patient and ID booklet/instructions given to pt  --Device checked by company rep with normal  function. Remote teaching done  --F/U wound check appointment on 10/13 @ 9am    --EP will sign off. Reconsult prn 24H hour events  Patient without complaints.    MEDICATIONS:  aspirin enteric coated 81 milliGRAM(s) Oral daily  carvedilol 12.5 milliGRAM(s) Oral every 12 hours  digoxin     Tablet 0.0625 milliGRAM(s) Oral daily  furosemide    Tablet 20 milliGRAM(s) Oral daily  tamsulosin 0.4 milliGRAM(s) Oral at bedtime    acetaminophen   Tablet .. 650 milliGRAM(s) Oral every 6 hours PRN  donepezil 5 milliGRAM(s) Oral at bedtime  memantine 10 milliGRAM(s) Oral two times a day  sertraline 25 milliGRAM(s) Oral daily      allopurinol 100 milliGRAM(s) Oral daily  levothyroxine 75 MICROGram(s) Oral daily        PHYSICAL EXAM:  T(C): 37.7 (10-02-20 @ 07:35), Max: 37.7 (10-02-20 @ 07:35)  HR: 60 (10-02-20 @ 07:35) (60 - 77)  BP: 96/47 (10-02-20 @ 07:35) (91/46 - 132/52)  RR: 18 (10-02-20 @ 07:35) (17 - 18)  SpO2: 96% (10-02-20 @ 07:35) (94% - 100%)  Wt(kg): --  I&O's Summary    01 Oct 2020 07:01  -  02 Oct 2020 07:00  --------------------------------------------------------  IN: 100 mL / OUT: 250 mL / NET: -150 mL    02 Oct 2020 07:01  -  02 Oct 2020 09:28  --------------------------------------------------------  IN: 240 mL / OUT: 301 mL / NET: -61 mL      Appearance: Alert. NAD	  Cardiovascular: +S1S2 RRR , RACW site tegaderm/ telfa dressing removed the glue adhered to the dressing and so the area was cleansed with chlorehexadine  dermabond applied to  site in a sterile fashion  Respiratory: CTA B/L	  Psychiatry: A & O x 3, Mood & affect appropriate  Gastrointestinal:  Soft, NT. ND. +BS	  Extremities: No edema BLE        LABS:	 	                          9.6    9.95  )-----------( 117      ( 02 Oct 2020 04:21 )             28.8     vanco level 7     10-02    142  |  107  |  29<H>  ----------------------------<  96  4.2   |  26  |  1.35<H>  10-01    140  |  105  |  33<H>  ----------------------------<  84  3.7   |  25  |  1.34<H>    Ca    8.7      02 Oct 2020 04:21  Ca    8.9      01 Oct 2020 13:37    TPro  5.1<L>  /  Alb  2.9<L>  /  TBili  0.4  /  DBili  x   /  AST  22  /  ALT  10  /  AlkPhos  69  10-01    TELEMETRY: V paced, PVC's  	    ECG:  	Vpaced     RADIOLOGY:    	  ASSESSMENT/PLAN: 	     86 yo man with history of cognitive decline, PAF on Eliquis to be resumed tonight, 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 His ICD was approaching TEA he is s/p generator change.   --additional dose of vancomycin 750mg x 1  --Reviewed post procedure instructions with patient and daughter Nataliia (over the phone)    --Device checked by [employer] company rep with normal  function. Remote teaching done  --F/U wound check appointment on 10/13 @ 9am  --stable for discharge to home 24H hour events  Patient without complaints.    MEDICATIONS:  aspirin enteric coated 81 milliGRAM(s) Oral daily  carvedilol 12.5 milliGRAM(s) Oral every 12 hours  digoxin     Tablet 0.0625 milliGRAM(s) Oral daily  furosemide    Tablet 20 milliGRAM(s) Oral daily  tamsulosin 0.4 milliGRAM(s) Oral at bedtime    acetaminophen   Tablet .. 650 milliGRAM(s) Oral every 6 hours PRN  donepezil 5 milliGRAM(s) Oral at bedtime  memantine 10 milliGRAM(s) Oral two times a day  sertraline 25 milliGRAM(s) Oral daily      allopurinol 100 milliGRAM(s) Oral daily  levothyroxine 75 MICROGram(s) Oral daily        PHYSICAL EXAM:  T(C): 37.7 (10-02-20 @ 07:35), Max: 37.7 (10-02-20 @ 07:35)  HR: 60 (10-02-20 @ 07:35) (60 - 77)  BP: 96/47 (10-02-20 @ 07:35) (91/46 - 132/52)  RR: 18 (10-02-20 @ 07:35) (17 - 18)  SpO2: 96% (10-02-20 @ 07:35) (94% - 100%)  Wt(kg): --  I&O's Summary    01 Oct 2020 07:01  -  02 Oct 2020 07:00  --------------------------------------------------------  IN: 100 mL / OUT: 250 mL / NET: -150 mL    02 Oct 2020 07:01  -  02 Oct 2020 09:28  --------------------------------------------------------  IN: 240 mL / OUT: 301 mL / NET: -61 mL      Appearance: Alert. NAD	  Cardiovascular: +S1S2 RRR , RACW site tegaderm/ telfa dressing removed the glue adhered to the dressing and so the area was cleansed with chlorehexadine  dermabond applied to  site in a sterile fashion  Respiratory: CTA B/L	  Psychiatry: A & O x 3, Mood & affect appropriate  Gastrointestinal:  Soft, NT. ND. +BS	  Extremities: No edema BLE        LABS:	 	                          9.6    9.95  )-----------( 117      ( 02 Oct 2020 04:21 )             28.8     vanco level 7     10-02    142  |  107  |  29<H>  ----------------------------<  96  4.2   |  26  |  1.35<H>  10-01    140  |  105  |  33<H>  ----------------------------<  84  3.7   |  25  |  1.34<H>    Ca    8.7      02 Oct 2020 04:21  Ca    8.9      01 Oct 2020 13:37    TPro  5.1<L>  /  Alb  2.9<L>  /  TBili  0.4  /  DBili  x   /  AST  22  /  ALT  10  /  AlkPhos  69  10-01    TELEMETRY: V paced, PVC's  	    ECG:  	Vpaced       ASSESSMENT/PLAN: 	     84 yo man with history of cognitive decline, PAF on Eliquis to be resumed tonight, 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 His ICD was approaching TEA he is s/p generator change.   --additional dose of vancomycin 750mg x 1  --Reviewed post procedure instructions with patient and daughter Nataliia (over the phone)    --Device checked by [employer] company rep with normal  function. Remote teaching done  --F/U wound check appointment on 10/13 @ 9am  --stable for discharge to home 24H hour events  Patient without complaints.    MEDICATIONS:  aspirin enteric coated 81 milliGRAM(s) Oral daily  carvedilol 12.5 milliGRAM(s) Oral every 12 hours  digoxin     Tablet 0.0625 milliGRAM(s) Oral daily  furosemide    Tablet 20 milliGRAM(s) Oral daily  tamsulosin 0.4 milliGRAM(s) Oral at bedtime    acetaminophen   Tablet .. 650 milliGRAM(s) Oral every 6 hours PRN  donepezil 5 milliGRAM(s) Oral at bedtime  memantine 10 milliGRAM(s) Oral two times a day  sertraline 25 milliGRAM(s) Oral daily      allopurinol 100 milliGRAM(s) Oral daily  levothyroxine 75 MICROGram(s) Oral daily        PHYSICAL EXAM:  T(C): 37.7 (10-02-20 @ 07:35), Max: 37.7 (10-02-20 @ 07:35)  HR: 60 (10-02-20 @ 07:35) (60 - 77)  BP: 96/47 (10-02-20 @ 07:35) (91/46 - 132/52)  RR: 18 (10-02-20 @ 07:35) (17 - 18)  SpO2: 96% (10-02-20 @ 07:35) (94% - 100%)  Wt(kg): --  I&O's Summary    01 Oct 2020 07:01  -  02 Oct 2020 07:00  --------------------------------------------------------  IN: 100 mL / OUT: 250 mL / NET: -150 mL    02 Oct 2020 07:01  -  02 Oct 2020 09:28  --------------------------------------------------------  IN: 240 mL / OUT: 301 mL / NET: -61 mL      Appearance: Alert. NAD	  Cardiovascular: +S1S2 RRR , RACW site tegaderm/ telfa dressing removed the glue adhered to the dressing and so the area was cleansed with chlorehexadine  dermabond applied to  site in a sterile fashion  Respiratory: CTA B/L	  Psychiatry: A & O x 3, Mood & affect appropriate  Gastrointestinal:  Soft, NT. ND. +BS	  Extremities: No edema BLE        LABS:	 	                          9.6    9.95  )-----------( 117      ( 02 Oct 2020 04:21 )             28.8     vanco level 7     10-02    142  |  107  |  29<H>  ----------------------------<  96  4.2   |  26  |  1.35<H>  10-01    140  |  105  |  33<H>  ----------------------------<  84  3.7   |  25  |  1.34<H>    Ca    8.7      02 Oct 2020 04:21  Ca    8.9      01 Oct 2020 13:37    TPro  5.1<L>  /  Alb  2.9<L>  /  TBili  0.4  /  DBili  x   /  AST  22  /  ALT  10  /  AlkPhos  69  10-01    TELEMETRY: V paced, PVC's  	    ECG:  	Vpaced       ASSESSMENT/PLAN: 	     84 yo man with history of cognitive decline, PAF on Eliquis to be resumed tonight, 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 His ICD was approaching TEA he is s/p generator change.   --additional dose of vancomycin 750mg x 1  --Reviewed post procedure instructions with patient and wife Nataliia (over the phone)    --Device checked by [employer] company rep with normal  function. Remote teaching done  --F/U wound check appointment on 10/13 @ 9am  --stable for discharge to home 24H hour events  Patient without complaints.    MEDICATIONS:  aspirin enteric coated 81 milliGRAM(s) Oral daily  carvedilol 12.5 milliGRAM(s) Oral every 12 hours  digoxin     Tablet 0.0625 milliGRAM(s) Oral daily  furosemide    Tablet 20 milliGRAM(s) Oral daily  tamsulosin 0.4 milliGRAM(s) Oral at bedtime    acetaminophen   Tablet .. 650 milliGRAM(s) Oral every 6 hours PRN  donepezil 5 milliGRAM(s) Oral at bedtime  memantine 10 milliGRAM(s) Oral two times a day  sertraline 25 milliGRAM(s) Oral daily      allopurinol 100 milliGRAM(s) Oral daily  levothyroxine 75 MICROGram(s) Oral daily        PHYSICAL EXAM:  T(C): 37.7 (10-02-20 @ 07:35), Max: 37.7 (10-02-20 @ 07:35)  HR: 60 (10-02-20 @ 07:35) (60 - 77)  BP: 96/47 (10-02-20 @ 07:35) (91/46 - 132/52)  RR: 18 (10-02-20 @ 07:35) (17 - 18)  SpO2: 96% (10-02-20 @ 07:35) (94% - 100%)  Wt(kg): --  I&O's Summary    01 Oct 2020 07:01  -  02 Oct 2020 07:00  --------------------------------------------------------  IN: 100 mL / OUT: 250 mL / NET: -150 mL    02 Oct 2020 07:01  -  02 Oct 2020 09:28  --------------------------------------------------------  IN: 240 mL / OUT: 301 mL / NET: -61 mL      Appearance: Alert. NAD	  Cardiovascular: +S1S2 RRR , RACW site tegaderm/ telfa dressing removed the glue adhered to the dressing and so the area was cleansed with chlorehexadine  dermabond applied to  site in a sterile fashion  Respiratory: CTA B/L	  Psychiatry: A & O x 3, Mood & affect appropriate  Gastrointestinal:  Soft, NT. ND. +BS	  Extremities: No edema BLE        LABS:	 	                          9.6    9.95  )-----------( 117      ( 02 Oct 2020 04:21 )             28.8     vanco level 7     10-02    142  |  107  |  29<H>  ----------------------------<  96  4.2   |  26  |  1.35<H>  10-01    140  |  105  |  33<H>  ----------------------------<  84  3.7   |  25  |  1.34<H>    Ca    8.7      02 Oct 2020 04:21  Ca    8.9      01 Oct 2020 13:37    TPro  5.1<L>  /  Alb  2.9<L>  /  TBili  0.4  /  DBili  x   /  AST  22  /  ALT  10  /  AlkPhos  69  10-01    TELEMETRY: V paced, PVC's  	    ECG:  	Vpaced       ASSESSMENT/PLAN: 	     84 yo man with history of cognitive decline, PAF on Eliquis to be resumed tonight, 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 His ICD was approaching TEA he is s/p generator change.   --additional dose of vancomycin 750mg x 1  --will resume his supressive antibiotics tomorrow on 10/3   --Reviewed post procedure instructions with patient and wife Nataliia (over the phone)    --Device checked by [employer] company rep with normal  function. Remote teaching done  --F/U wound check appointment on 10/13 @ 9am  --stable for discharge to home after vancomycin infusion completed

## 2020-10-05 PROBLEM — M48.00 SPINAL STENOSIS, SITE UNSPECIFIED: Chronic | Status: ACTIVE | Noted: 2020-10-01

## 2020-10-05 PROBLEM — I42.9 CARDIOMYOPATHY, UNSPECIFIED: Chronic | Status: ACTIVE | Noted: 2020-10-01

## 2020-10-07 ENCOUNTER — APPOINTMENT (OUTPATIENT)
Dept: ELECTROPHYSIOLOGY | Facility: CLINIC | Age: 85
End: 2020-10-07

## 2020-10-13 ENCOUNTER — APPOINTMENT (OUTPATIENT)
Dept: ELECTROPHYSIOLOGY | Facility: CLINIC | Age: 85
End: 2020-10-13
Payer: COMMERCIAL

## 2020-10-13 ENCOUNTER — NON-APPOINTMENT (OUTPATIENT)
Age: 85
End: 2020-10-13

## 2020-10-13 VITALS
BODY MASS INDEX: 18.79 KG/M2 | DIASTOLIC BLOOD PRESSURE: 71 MMHG | HEART RATE: 59 BPM | OXYGEN SATURATION: 100 % | HEIGHT: 68 IN | SYSTOLIC BLOOD PRESSURE: 111 MMHG | WEIGHT: 124 LBS

## 2020-10-13 PROCEDURE — 93284 PRGRMG EVAL IMPLANTABLE DFB: CPT

## 2020-10-13 PROCEDURE — 99024 POSTOP FOLLOW-UP VISIT: CPT

## 2020-10-21 ENCOUNTER — APPOINTMENT (OUTPATIENT)
Dept: CARDIOLOGY | Facility: CLINIC | Age: 85
End: 2020-10-21

## 2020-11-24 ENCOUNTER — NON-APPOINTMENT (OUTPATIENT)
Age: 85
End: 2020-11-24

## 2020-11-27 ENCOUNTER — APPOINTMENT (OUTPATIENT)
Dept: ELECTROPHYSIOLOGY | Facility: CLINIC | Age: 85
End: 2020-11-27

## 2021-01-01 NOTE — CHART NOTE - NSCHARTNOTEFT_GEN_A_CORE
Nutrition Follow Up Note  Patient seen for: Malnutrition follow up    Source: patient visited at bedside states he is drinking 3 fortune and 2  ensure daily. patient states he feels better, understands nevin the must make an effort to increased intake to maintain his weight and avoid recurrent weakness and dehydration. Family aware also    Diet : DASH plus Ensure  supplement  x2 daily         PO intake : 75% of meals     Source for PO intake: staff, patient        Daily Weight in k.8 (-18)      Pertinent Medications: MEDICATIONS  (STANDING):  allopurinol 100 milliGRAM(s) Oral daily  apixaban 2.5 milliGRAM(s) Oral every 12 hours  aspirin enteric coated 81 milliGRAM(s) Oral daily  carvedilol 12.5 milliGRAM(s) Oral every 12 hours  doxycycline hyclate Capsule 100 milliGRAM(s) Oral every 12 hours  furosemide    Tablet 20 milliGRAM(s) Oral daily  levothyroxine 50 MICROGram(s) Oral daily  pantoprazole    Tablet 40 milliGRAM(s) Oral before breakfast  tamsulosin 0.4 milliGRAM(s) Oral at bedtime    MEDICATIONS  (PRN):    Pertinent Labs:  @ 07:19: Na 141, BUN 31<H>, Cr 1.59<H>, BG 84, K+ 4.0, Phos --, Mg --, Alk Phos --, ALT/SGPT --, AST/SGOT --, HbA1c --      Skin per nursing documentation: wnl  Edema:  none    Estimated Needs:   [X ] no change since previous assessment  [ ] recalculated:     Previous Nutrition Diagnosis: Malnutrition  Nutrition Diagnosis is: ongoing, addressed      Recommend  1)continue ensure 2x daily  2) assist with menus, food preferences  3) monitor weight  Monitoring and Evaluation:     Continue to monitor Nutritional intake, Tolerance to diet prescription, weights, labs, skin integrity    RD remains available upon request and will follow up per protocol
Pt cleared for DC to [facility] by Dr. Cary.  Per Dr. Haskins: Continue with hospital's dose of coreg 12.5mg BID, Lasix 20mg and d/c Lanoxin.   Pt dc to [facility].
Upon Nutritional Assessment by the Registered Dietitian your patient was determined to meet criteria / has evidence of the following diagnosis/diagnoses:          [ ]  Mild Protein Calorie Malnutrition        [ X]  Moderate Protein Calorie Malnutrition        [ ] Severe Protein Calorie Malnutrition        [ ] Unspecified Protein Calorie Malnutrition        [ ] Underweight / BMI <19        [ ] Morbid Obesity / BMI > 40      Findings as based on:  [X ] Comprehensive nutrition assessment  weight loss >6% in 3 months  [ X] Nutrition Focused Physical Exam moderate generalized muscle depletion, poor skin turgor  [X ] Other: poor denttion but patient refuses chopped diet      Nutrition Plan/Recommendations:  add ensure 2x daily; consider appetite stimulant;         PROVIDER Section:     By signing this assessment you are acknowledging and agree with the diagnosis/diagnoses assigned by the Registered Dietitian    Comments:
Statement Selected

## 2022-12-13 NOTE — H&P ADULT - DOES THIS PATIENT HAVE A HISTORY OF OR HAS BEEN DX WITH HEART FAILURE?
NOTIFICATION RETURN TO WORK / SCHOOL    12/13/2022 9:16 AM    Mr. Chandra Hernandez  [address]  [id number]      To Whom It May Concern:    Chandra Hernandez is currently under the care of Rafita Whitman. He will return to work/school on: 12/13/22. If there are questions or concerns please have the patient contact our office.         Sincerely,      DESTINY Ponce
yes

## 2023-07-24 ENCOUNTER — NON-APPOINTMENT (OUTPATIENT)
Age: 88
End: 2023-07-24

## 2023-11-09 NOTE — PHYSICAL THERAPY INITIAL EVALUATION ADULT - DIAGNOSIS, PT EVAL
Pt resting in bed, states nausea better, vss, ambulates well independently, called for tele box, cont to monitor Electronically signed by Kerry Sanders RN on 11/9/2023 at 1:47 PM Impaired functional mobility secondary to decreased strength, balance and endurance

## 2024-10-01 NOTE — PATIENT PROFILE ADULT. - PRO MENTAL HEALTH SX RECENT
Left message to schedule annual exam as last annual was 08/2023. Refills can then be placed to pharmacy.   none